# Patient Record
Sex: FEMALE | Race: WHITE | NOT HISPANIC OR LATINO | Employment: UNEMPLOYED | URBAN - METROPOLITAN AREA
[De-identification: names, ages, dates, MRNs, and addresses within clinical notes are randomized per-mention and may not be internally consistent; named-entity substitution may affect disease eponyms.]

---

## 2020-12-18 LAB
NONINV COLON CA DNA+OCC BLD SCRN STL QL: NEGATIVE
NONINV COLON CA DNA+OCC BLD SCRN STL-IMP: NORMAL

## 2023-03-02 PROBLEM — I49.3 FREQUENT PVCS: Status: ACTIVE | Noted: 2023-03-02

## 2023-03-02 PROBLEM — M85.80 BONE LOSS: Status: ACTIVE | Noted: 2023-03-02

## 2023-03-02 PROBLEM — R07.89 ATYPICAL CHEST PAIN: Status: ACTIVE | Noted: 2023-03-02

## 2023-03-02 PROBLEM — N28.9 RENAL INSUFFICIENCY: Status: ACTIVE | Noted: 2023-03-02

## 2023-03-02 PROBLEM — N17.9 ACUTE KIDNEY INJURY (CMS-HCC): Status: ACTIVE | Noted: 2023-03-02

## 2023-03-02 PROBLEM — R26.81 UNSTEADY GAIT: Status: ACTIVE | Noted: 2023-03-02

## 2023-03-02 PROBLEM — R42 ORTHOSTATIC DIZZINESS: Status: ACTIVE | Noted: 2023-03-02

## 2023-03-02 PROBLEM — I47.10 PAROXYSMAL SVT (SUPRAVENTRICULAR TACHYCARDIA) (CMS-HCC): Status: ACTIVE | Noted: 2023-03-02

## 2023-03-02 PROBLEM — N18.2 CHRONIC RENAL IMPAIRMENT, STAGE 2 (MILD): Status: ACTIVE | Noted: 2023-03-02

## 2023-03-02 PROBLEM — H81.10 BENIGN PAROXYSMAL POSITIONAL VERTIGO: Status: ACTIVE | Noted: 2023-03-02

## 2023-03-02 PROBLEM — R07.9 CHEST PAIN: Status: ACTIVE | Noted: 2023-03-02

## 2023-03-02 PROBLEM — I50.32 CHRONIC DIASTOLIC HEART FAILURE WITH PRESERVED EJECTION FRACTION (MULTI): Status: ACTIVE | Noted: 2023-03-02

## 2023-03-02 PROBLEM — I49.1 PAC (PREMATURE ATRIAL CONTRACTION): Status: ACTIVE | Noted: 2023-03-02

## 2023-03-02 PROBLEM — R55 SYNCOPE: Status: ACTIVE | Noted: 2023-03-02

## 2023-03-02 PROBLEM — E78.5 HYPERLIPIDEMIA: Status: ACTIVE | Noted: 2023-03-02

## 2023-03-02 PROBLEM — M81.0 OSTEOPOROSIS: Status: ACTIVE | Noted: 2023-03-02

## 2023-03-02 PROBLEM — H81.10 VERTIGO, BENIGN POSITIONAL: Status: ACTIVE | Noted: 2023-03-02

## 2023-03-02 PROBLEM — I10 ESSENTIAL HYPERTENSION, BENIGN: Status: ACTIVE | Noted: 2023-03-02

## 2023-03-02 PROBLEM — E03.9 HYPOTHYROIDISM: Status: ACTIVE | Noted: 2023-03-02

## 2023-03-02 PROBLEM — H57.04: Status: ACTIVE | Noted: 2023-03-02

## 2023-03-02 PROBLEM — R55 TRANSIENT LOC (LOSS OF CONSCIOUSNESS): Status: ACTIVE | Noted: 2023-03-02

## 2023-03-02 PROBLEM — F41.9 ANXIETY: Status: ACTIVE | Noted: 2023-03-02

## 2023-03-02 PROBLEM — S06.36AA: Status: ACTIVE | Noted: 2023-03-02

## 2023-03-02 PROBLEM — R94.39 ABNORMAL STRESS TEST: Status: ACTIVE | Noted: 2023-03-02

## 2023-03-02 PROBLEM — R42 LIGHTHEADEDNESS: Status: ACTIVE | Noted: 2023-03-02

## 2023-03-02 PROBLEM — H61.23 BILATERAL IMPACTED CERUMEN: Status: ACTIVE | Noted: 2023-03-02

## 2023-03-02 PROBLEM — E53.8 VITAMIN B12 DEFICIENCY: Status: ACTIVE | Noted: 2023-03-02

## 2023-03-02 PROBLEM — R42 DIZZINESS: Status: ACTIVE | Noted: 2023-03-02

## 2023-03-02 RX ORDER — DENOSUMAB 60 MG/ML
60 INJECTION SUBCUTANEOUS
COMMUNITY
Start: 2020-03-03 | End: 2023-10-04 | Stop reason: SDUPTHER

## 2023-03-02 RX ORDER — CEPHRADINE 500 MG
1 CAPSULE ORAL DAILY
COMMUNITY
End: 2024-04-18 | Stop reason: ALTCHOICE

## 2023-03-02 RX ORDER — LANOLIN ALCOHOL/MO/W.PET/CERES
1 CREAM (GRAM) TOPICAL EVERY OTHER DAY
COMMUNITY
End: 2024-02-08 | Stop reason: WASHOUT

## 2023-03-02 RX ORDER — CARVEDILOL 12.5 MG/1
1 TABLET ORAL 2 TIMES DAILY
COMMUNITY
Start: 2022-09-01 | End: 2023-03-29 | Stop reason: SDUPTHER

## 2023-03-02 RX ORDER — PAROXETINE HYDROCHLORIDE 20 MG/1
1 TABLET, FILM COATED ORAL DAILY
COMMUNITY
End: 2023-03-29 | Stop reason: SDUPTHER

## 2023-03-02 RX ORDER — LEVOTHYROXINE SODIUM 75 UG/1
1 CAPSULE ORAL DAILY
COMMUNITY
End: 2023-03-29 | Stop reason: SDUPTHER

## 2023-03-22 LAB
ALANINE AMINOTRANSFERASE (SGPT) (U/L) IN SER/PLAS: 8 U/L (ref 7–45)
ALBUMIN (G/DL) IN SER/PLAS: 4.3 G/DL (ref 3.4–5)
ALKALINE PHOSPHATASE (U/L) IN SER/PLAS: 62 U/L (ref 33–136)
ANION GAP IN SER/PLAS: 11 MMOL/L (ref 10–20)
ASPARTATE AMINOTRANSFERASE (SGOT) (U/L) IN SER/PLAS: 14 U/L (ref 9–39)
BASOPHILS (10*3/UL) IN BLOOD BY AUTOMATED COUNT: 0.02 X10E9/L (ref 0–0.1)
BASOPHILS/100 LEUKOCYTES IN BLOOD BY AUTOMATED COUNT: 0.4 % (ref 0–2)
BILIRUBIN TOTAL (MG/DL) IN SER/PLAS: 0.9 MG/DL (ref 0–1.2)
CALCIDIOL (25 OH VITAMIN D3) (NG/ML) IN SER/PLAS: 40 NG/ML
CALCIUM (MG/DL) IN SER/PLAS: 9.6 MG/DL (ref 8.6–10.3)
CARBON DIOXIDE, TOTAL (MMOL/L) IN SER/PLAS: 30 MMOL/L (ref 21–32)
CHLORIDE (MMOL/L) IN SER/PLAS: 102 MMOL/L (ref 98–107)
CHOLESTEROL (MG/DL) IN SER/PLAS: 171 MG/DL (ref 0–199)
CHOLESTEROL IN HDL (MG/DL) IN SER/PLAS: 31 MG/DL
CHOLESTEROL/HDL RATIO: 5.5
COBALAMIN (VITAMIN B12) (PG/ML) IN SER/PLAS: 1194 PG/ML (ref 211–911)
CREATININE (MG/DL) IN SER/PLAS: 0.99 MG/DL (ref 0.5–1.05)
EOSINOPHILS (10*3/UL) IN BLOOD BY AUTOMATED COUNT: 0.09 X10E9/L (ref 0–0.4)
EOSINOPHILS/100 LEUKOCYTES IN BLOOD BY AUTOMATED COUNT: 1.7 % (ref 0–6)
ERYTHROCYTE DISTRIBUTION WIDTH (RATIO) BY AUTOMATED COUNT: 14.3 % (ref 11.5–14.5)
ERYTHROCYTE MEAN CORPUSCULAR HEMOGLOBIN CONCENTRATION (G/DL) BY AUTOMATED: 31.2 G/DL (ref 32–36)
ERYTHROCYTE MEAN CORPUSCULAR VOLUME (FL) BY AUTOMATED COUNT: 88 FL (ref 80–100)
ERYTHROCYTES (10*6/UL) IN BLOOD BY AUTOMATED COUNT: 4.72 X10E12/L (ref 4–5.2)
GFR FEMALE: 53 ML/MIN/1.73M2
GLUCOSE (MG/DL) IN SER/PLAS: 123 MG/DL (ref 74–99)
HEMATOCRIT (%) IN BLOOD BY AUTOMATED COUNT: 41.7 % (ref 36–46)
HEMOGLOBIN (G/DL) IN BLOOD: 13 G/DL (ref 12–16)
IMMATURE GRANULOCYTES/100 LEUKOCYTES IN BLOOD BY AUTOMATED COUNT: 0.4 % (ref 0–0.9)
LDL: 98 MG/DL (ref 0–99)
LEUKOCYTES (10*3/UL) IN BLOOD BY AUTOMATED COUNT: 5.2 X10E9/L (ref 4.4–11.3)
LYMPHOCYTES (10*3/UL) IN BLOOD BY AUTOMATED COUNT: 1.18 X10E9/L (ref 0.8–3)
LYMPHOCYTES/100 LEUKOCYTES IN BLOOD BY AUTOMATED COUNT: 22.7 % (ref 13–44)
MONOCYTES (10*3/UL) IN BLOOD BY AUTOMATED COUNT: 0.15 X10E9/L (ref 0.05–0.8)
MONOCYTES/100 LEUKOCYTES IN BLOOD BY AUTOMATED COUNT: 2.9 % (ref 2–10)
NEUTROPHILS (10*3/UL) IN BLOOD BY AUTOMATED COUNT: 3.73 X10E9/L (ref 1.6–5.5)
NEUTROPHILS/100 LEUKOCYTES IN BLOOD BY AUTOMATED COUNT: 71.9 % (ref 40–80)
NON HDL CHOLESTEROL: 140 MG/DL
PLATELETS (10*3/UL) IN BLOOD AUTOMATED COUNT: 175 X10E9/L (ref 150–450)
POTASSIUM (MMOL/L) IN SER/PLAS: 4.1 MMOL/L (ref 3.5–5.3)
PROTEIN TOTAL: 7 G/DL (ref 6.4–8.2)
SODIUM (MMOL/L) IN SER/PLAS: 139 MMOL/L (ref 136–145)
THYROTROPIN (MIU/L) IN SER/PLAS BY DETECTION LIMIT <= 0.05 MIU/L: 0.19 MIU/L (ref 0.44–3.98)
THYROXINE (T4) FREE (NG/DL) IN SER/PLAS: 1.23 NG/DL (ref 0.61–1.12)
TRIGLYCERIDE (MG/DL) IN SER/PLAS: 212 MG/DL (ref 0–149)
UREA NITROGEN (MG/DL) IN SER/PLAS: 24 MG/DL (ref 6–23)
VLDL: 42 MG/DL (ref 0–40)

## 2023-03-24 LAB
ALBUMIN (MG/L) IN URINE: 504.7 MG/L
ALBUMIN/CREATININE (UG/MG) IN URINE: 696.1 UG/MG CRT (ref 0–30)
CREATININE (MG/DL) IN URINE: 72.5 MG/DL (ref 20–320)

## 2023-03-29 ENCOUNTER — OFFICE VISIT (OUTPATIENT)
Dept: PRIMARY CARE | Facility: CLINIC | Age: 88
End: 2023-03-29
Payer: COMMERCIAL

## 2023-03-29 VITALS
BODY MASS INDEX: 24.35 KG/M2 | HEART RATE: 100 BPM | HEIGHT: 60 IN | OXYGEN SATURATION: 96 % | DIASTOLIC BLOOD PRESSURE: 126 MMHG | SYSTOLIC BLOOD PRESSURE: 190 MMHG | WEIGHT: 124 LBS | RESPIRATION RATE: 16 BRPM | TEMPERATURE: 97.7 F

## 2023-03-29 DIAGNOSIS — I47.10 PAROXYSMAL SVT (SUPRAVENTRICULAR TACHYCARDIA) (CMS-HCC): ICD-10-CM

## 2023-03-29 DIAGNOSIS — M81.0 AGE-RELATED OSTEOPOROSIS WITHOUT CURRENT PATHOLOGICAL FRACTURE: ICD-10-CM

## 2023-03-29 DIAGNOSIS — Z00.00 ROUTINE GENERAL MEDICAL EXAMINATION AT HEALTH CARE FACILITY: ICD-10-CM

## 2023-03-29 DIAGNOSIS — N18.2 CHRONIC RENAL IMPAIRMENT, STAGE 2 (MILD): ICD-10-CM

## 2023-03-29 DIAGNOSIS — E03.9 HYPOTHYROIDISM, UNSPECIFIED TYPE: ICD-10-CM

## 2023-03-29 DIAGNOSIS — E78.5 HYPERLIPIDEMIA, UNSPECIFIED HYPERLIPIDEMIA TYPE: ICD-10-CM

## 2023-03-29 DIAGNOSIS — S06.36AS: ICD-10-CM

## 2023-03-29 DIAGNOSIS — I10 ESSENTIAL HYPERTENSION, BENIGN: Primary | ICD-10-CM

## 2023-03-29 DIAGNOSIS — F41.9 ANXIETY: ICD-10-CM

## 2023-03-29 DIAGNOSIS — E53.8 VITAMIN B12 DEFICIENCY: ICD-10-CM

## 2023-03-29 DIAGNOSIS — N28.9 RENAL INSUFFICIENCY: ICD-10-CM

## 2023-03-29 PROBLEM — M70.61 TROCHANTERIC BURSITIS OF RIGHT HIP: Status: ACTIVE | Noted: 2017-08-25

## 2023-03-29 PROBLEM — M76.31 ILIOTIBIAL BAND SYNDROME OF RIGHT SIDE: Status: ACTIVE | Noted: 2017-08-25

## 2023-03-29 PROBLEM — H02.402 PTOSIS OF EYELID, LEFT: Status: ACTIVE | Noted: 2018-02-12

## 2023-03-29 PROBLEM — N60.09 BREAST CYST: Status: ACTIVE | Noted: 2018-01-05

## 2023-03-29 PROBLEM — M53.3 COCCYX PAIN: Status: ACTIVE | Noted: 2017-09-26

## 2023-03-29 PROBLEM — M47.816 SPONDYLOSIS WITHOUT MYELOPATHY OR RADICULOPATHY, LUMBAR REGION: Status: ACTIVE | Noted: 2017-08-25

## 2023-03-29 PROBLEM — H53.2 BINOCULAR VISION DISORDER WITH DIPLOPIA: Status: ACTIVE | Noted: 2018-02-12

## 2023-03-29 PROBLEM — K86.2 PANCREAS CYST (HHS-HCC): Status: ACTIVE | Noted: 2018-01-05

## 2023-03-29 PROBLEM — G89.29 CHRONIC PAIN: Status: ACTIVE | Noted: 2017-08-25

## 2023-03-29 PROBLEM — M48.061 SPINAL STENOSIS, LUMBAR REGION, WITHOUT NEUROGENIC CLAUDICATION: Status: ACTIVE | Noted: 2017-08-25

## 2023-03-29 PROCEDURE — 1160F RVW MEDS BY RX/DR IN RCRD: CPT | Performed by: INTERNAL MEDICINE

## 2023-03-29 PROCEDURE — 1170F FXNL STATUS ASSESSED: CPT | Performed by: INTERNAL MEDICINE

## 2023-03-29 PROCEDURE — 1159F MED LIST DOCD IN RCRD: CPT | Performed by: INTERNAL MEDICINE

## 2023-03-29 PROCEDURE — G0439 PPPS, SUBSEQ VISIT: HCPCS | Performed by: INTERNAL MEDICINE

## 2023-03-29 PROCEDURE — 99214 OFFICE O/P EST MOD 30 MIN: CPT | Performed by: INTERNAL MEDICINE

## 2023-03-29 PROCEDURE — 1036F TOBACCO NON-USER: CPT | Performed by: INTERNAL MEDICINE

## 2023-03-29 PROCEDURE — 3080F DIAST BP >= 90 MM HG: CPT | Performed by: INTERNAL MEDICINE

## 2023-03-29 PROCEDURE — 3077F SYST BP >= 140 MM HG: CPT | Performed by: INTERNAL MEDICINE

## 2023-03-29 RX ORDER — PAROXETINE HYDROCHLORIDE 20 MG/1
20 TABLET, FILM COATED ORAL DAILY
Qty: 90 TABLET | Refills: 1 | Status: SHIPPED | OUTPATIENT
Start: 2023-03-29 | End: 2023-10-04 | Stop reason: ALTCHOICE

## 2023-03-29 RX ORDER — LEVOTHYROXINE SODIUM 50 UG/1
50 TABLET ORAL
Qty: 90 TABLET | Refills: 3 | Status: SHIPPED | OUTPATIENT
Start: 2023-03-29 | End: 2024-02-29

## 2023-03-29 RX ORDER — CARVEDILOL 25 MG/1
25 TABLET ORAL 2 TIMES DAILY
Qty: 180 TABLET | Refills: 1 | Status: SHIPPED | OUTPATIENT
Start: 2023-03-29 | End: 2023-08-21 | Stop reason: SDUPTHER

## 2023-03-29 SDOH — ECONOMIC STABILITY: FOOD INSECURITY: WITHIN THE PAST 12 MONTHS, YOU WORRIED THAT YOUR FOOD WOULD RUN OUT BEFORE YOU GOT MONEY TO BUY MORE.: NEVER TRUE

## 2023-03-29 SDOH — ECONOMIC STABILITY: FOOD INSECURITY: WITHIN THE PAST 12 MONTHS, THE FOOD YOU BOUGHT JUST DIDN'T LAST AND YOU DIDN'T HAVE MONEY TO GET MORE.: NEVER TRUE

## 2023-03-29 ASSESSMENT — ACTIVITIES OF DAILY LIVING (ADL)
DOING_HOUSEWORK: NEEDS ASSISTANCE
GROCERY_SHOPPING: TOTAL CARE
TAKING_MEDICATION: INDEPENDENT
BATHING: INDEPENDENT
MANAGING_FINANCES: INDEPENDENT
DRESSING: INDEPENDENT

## 2023-03-29 ASSESSMENT — LIFESTYLE VARIABLES
HOW OFTEN DO YOU HAVE SIX OR MORE DRINKS ON ONE OCCASION: NEVER
HOW MANY STANDARD DRINKS CONTAINING ALCOHOL DO YOU HAVE ON A TYPICAL DAY: PATIENT DOES NOT DRINK
HOW OFTEN DO YOU HAVE A DRINK CONTAINING ALCOHOL: NEVER
AUDIT-C TOTAL SCORE: 0
SKIP TO QUESTIONS 9-10: 1

## 2023-03-29 ASSESSMENT — ENCOUNTER SYMPTOMS
JOINT SWELLING: 0
SHORTNESS OF BREATH: 0
NAUSEA: 0
EYES NEGATIVE: 1
FATIGUE: 0
DIFFICULTY URINATING: 0
CHEST TIGHTNESS: 0
HEADACHES: 0
LOSS OF SENSATION IN FEET: 0
BLOOD IN STOOL: 0
DIZZINESS: 0
LIGHT-HEADEDNESS: 0
FEVER: 0
FREQUENCY: 0
POLYDIPSIA: 0
ABDOMINAL PAIN: 0
PSYCHIATRIC NEGATIVE: 1
OCCASIONAL FEELINGS OF UNSTEADINESS: 1
BACK PAIN: 0
NECK PAIN: 0
DIARRHEA: 0
CHILLS: 0
FLANK PAIN: 0
VOMITING: 0
DYSURIA: 0
PALPITATIONS: 0
UNEXPECTED WEIGHT CHANGE: 0
MYALGIAS: 0

## 2023-03-29 ASSESSMENT — PATIENT HEALTH QUESTIONNAIRE - PHQ9
SUM OF ALL RESPONSES TO PHQ9 QUESTIONS 1 & 2: 0
2. FEELING DOWN, DEPRESSED OR HOPELESS: NOT AT ALL
1. LITTLE INTEREST OR PLEASURE IN DOING THINGS: NOT AT ALL

## 2023-03-29 NOTE — PROGRESS NOTES
Subjective   Patient ID: Gladys Salas is a 89 y.o. female who presents for No chief complaint on file..    HPI     Saw Dr. Aguirre in 12/2022. Recent Echo demonstrates normal left ventricular systolic function with an ejection fraction of 65% and no significant valve abnormalities. She should continue current cardiac medications. Has upcoming follow-up appointment.    HTN: BP is fair today, continue the carvedilol only, follow up with Dr Aguirre     renal insufficiency, likely due to pre renal azotemia, patient will follow up with nephrology, recheck labs to document improvement     hypothyroidism: treated     hypertriglyceridemia: try to avoid added sweets in the diet, recheck labs in 6 months, trial of Vascepa if the TG levels remain elevated      osteoporosis: continue the Prolia as ordered, no recent fractures were noted     B12 deficiency: has decreased dose of B12 due to high B12 levels, levels have fallen, recheck labs in 6 months     anxiety and depression, sleep disorder: continue the paroxetine, it is helpful for the patient    Review of Systems    Objective   There were no vitals taken for this visit.    Physical Exam    Assessment/Plan   {Assess/PlanSmartLinks:05893}    Follow-up here in 6 months   Recheck labs in 4-6 months  Maintain follow-up appointments with specialists

## 2023-03-29 NOTE — PROGRESS NOTES
Subjective   Reason for Visit: Gladys Salas is an 89 y.o. female here for a Medicare Wellness visit.     Past Medical, Surgical, and Family History reviewed and updated in chart.    Reviewed all medications by prescribing practitioner or clinical pharmacist (such as prescriptions, OTCs, herbal therapies and supplements) and documented in the medical record.    HPI  Patient is here for medicare wellness. Overall is feeling well. Has no concerns today.    Saw Dr. Aguirre in 12/2022. Recent Echo demonstrates normal left ventricular systolic function with an ejection fraction of 65% and no significant valve abnormalities. She should continue current cardiac medications. Has upcoming follow-up appointment in 7/2023. Patient took multiple meds in the past, these were decreased by Dr Aguirre     HTN: BP is elevated today, continue the carvedilol only, follow up with Dr Aguirre.     renal insufficiency: Patient just followed up with nephrologist.      hypothyroidism: Tolerating meds well.      hypertriglyceridemia: try to avoid added sweets in the diet, recheck labs in 6 months, trial of Vascepa if the TG levels remain elevated      osteoporosis: Taking Prolia.      B12 deficiency: Takes B12 every other day. B12 levels elevated on recent labs.      anxiety and depression, sleep disorder: Has been sleeping better. Doing good with Paxil at night.        Patient Care Team:  Sulaiman Santoyo MD as PCP - General     Review of Systems   Constitutional:  Negative for chills, fatigue, fever and unexpected weight change.   HENT: Negative.     Eyes: Negative.    Respiratory:  Negative for chest tightness and shortness of breath.    Cardiovascular:  Negative for chest pain, palpitations and leg swelling.   Gastrointestinal:  Negative for abdominal pain, blood in stool, diarrhea, nausea and vomiting.   Endocrine: Negative for cold intolerance, heat intolerance, polydipsia and polyuria.   Genitourinary:  Negative for decreased urine  volume, difficulty urinating, dysuria, flank pain, frequency and urgency.   Musculoskeletal:  Negative for back pain, joint swelling, myalgias and neck pain.   Skin: Negative.    Neurological:  Negative for dizziness, light-headedness and headaches.        Patient saw kidney doctor and neurologist   Psychiatric/Behavioral: Negative.         Objective   Vitals:  BP (!) 190/126 (BP Location: Right arm, Patient Position: Sitting, BP Cuff Size: Adult)   Pulse 100   Temp 36.5 °C (97.7 °F)   Resp 16   Ht 1.524 m (5')   Wt 56.2 kg (124 lb)   SpO2 96%   BMI 24.22 kg/m²       Physical Exam  Constitutional:       Appearance: Normal appearance. She is normal weight. She is not ill-appearing.   HENT:      Head: Normocephalic and atraumatic.      Right Ear: External ear normal.      Left Ear: External ear normal.      Nose: Nose normal.      Mouth/Throat:      Mouth: Mucous membranes are moist.   Eyes:      Extraocular Movements: Extraocular movements intact.      Conjunctiva/sclera: Conjunctivae normal.   Cardiovascular:      Rate and Rhythm: Tachycardia present. Rhythm irregular.      Pulses: Normal pulses.      Heart sounds: Normal heart sounds.      Comments: Irregular rate and rhythm today noted, patient feels well  Pulmonary:      Effort: Pulmonary effort is normal.      Breath sounds: Normal breath sounds.   Chest:      Chest wall: No tenderness.   Abdominal:      General: Abdomen is flat.      Palpations: Abdomen is soft.      Tenderness: There is no abdominal tenderness. There is no guarding or rebound.   Musculoskeletal:         General: Normal range of motion.      Cervical back: Normal range of motion and neck supple. No tenderness.      Right lower leg: No edema.      Left lower leg: No edema.   Lymphadenopathy:      Cervical: No cervical adenopathy.   Skin:     General: Skin is warm and dry.      Findings: No rash.   Neurological:      General: No focal deficit present.      Mental Status: She is alert and  oriented to person, place, and time.      Motor: No weakness.      Gait: Gait normal.      Deep Tendon Reflexes: Reflexes normal.   Psychiatric:         Mood and Affect: Mood normal.         Behavior: Behavior normal.         Thought Content: Thought content normal.         Judgment: Judgment normal.         Assessment/Plan   Problem List Items Addressed This Visit          Nervous    Traumatic cerebral hemorrhage    Current Assessment & Plan     Patient did follow up with neurology,  she is stable         Relevant Orders    CBC and Auto Differential       Circulatory    Essential hypertension, benign - Primary    Current Assessment & Plan     Will increase the dose of carvedilol for now, patient should follow up with cardiology as scheduled         Relevant Medications    carvedilol (Coreg) 25 mg tablet    Other Relevant Orders    Albumin , Urine Random    CBC and Auto Differential    Paroxysmal SVT (supraventricular tachycardia) (CMS/HCC)    Current Assessment & Plan     Patient is tachycardic now, has an irregular rhythm, check an ECG now, This is SVT vs atrial fibrillation. Some P waves may be noted in the ECG. Will consult with cardiology about options for the patient.          Relevant Medications    carvedilol (Coreg) 25 mg tablet    Other Relevant Orders    ECG 12 lead (Ancillary Performed)    CBC and Auto Differential       Genitourinary    Chronic renal impairment, stage 2 (mild)    Current Assessment & Plan     Patient is stable, she sees nephrology         Relevant Orders    Comprehensive Metabolic Panel    CBC and Auto Differential    Renal insufficiency    Relevant Orders    CBC and Auto Differential       Musculoskeletal    Osteoporosis    Current Assessment & Plan     Continue Prolia as ordered, Follow up as scheduled.  Check a DEXA scan         Relevant Orders    XR DEXA bone density    Vitamin D 1,25 Dihydroxy    CBC and Auto Differential       Endocrine/Metabolic    Hypothyroidism    Current  Assessment & Plan     Patient appears slightly hyperthyroid now, will decrease the dose of levothyroxine          Relevant Medications    levothyroxine (Euthyrox) 50 mcg tablet    Other Relevant Orders    TSH with reflex to Free T4 if abnormal    CBC and Auto Differential    Vitamin B12 deficiency    Current Assessment & Plan     Reduce dose of B 12 to every 3rd day, recheck labs in 4-6 months         Relevant Orders    CBC and Auto Differential    Vitamin B12       Other    Anxiety    Relevant Medications    PARoxetine (Paxil) 20 mg tablet    Other Relevant Orders    CBC and Auto Differential    Hyperlipidemia    Current Assessment & Plan     Fair on current med therapy, TG level is slightly elevated, recheck labs in 6 months         Relevant Orders    Lipid panel    CBC and Auto Differential     Other Visit Diagnoses       Routine general medical examination at health care facility        Relevant Orders    CBC and Auto Differential        Medicare wellness exam completed today

## 2023-03-29 NOTE — ASSESSMENT & PLAN NOTE
Patient is tachycardic now, has an irregular rhythm, check an ECG now, This is SVT vs atrial fibrillation. Some P waves may be noted in the ECG. Will consult with cardiology about options for the patient.

## 2023-03-29 NOTE — ASSESSMENT & PLAN NOTE
Will increase the dose of carvedilol for now, patient should follow up with cardiology as scheduled

## 2023-06-27 LAB
ANION GAP IN SER/PLAS: 13 MMOL/L (ref 10–20)
CALCIUM (MG/DL) IN SER/PLAS: 9.4 MG/DL (ref 8.6–10.3)
CARBON DIOXIDE, TOTAL (MMOL/L) IN SER/PLAS: 28 MMOL/L (ref 21–32)
CHLORIDE (MMOL/L) IN SER/PLAS: 103 MMOL/L (ref 98–107)
CREATININE (MG/DL) IN SER/PLAS: 1.02 MG/DL (ref 0.5–1.05)
ERYTHROCYTE DISTRIBUTION WIDTH (RATIO) BY AUTOMATED COUNT: 14.7 % (ref 11.5–14.5)
ERYTHROCYTE MEAN CORPUSCULAR HEMOGLOBIN CONCENTRATION (G/DL) BY AUTOMATED: 31.2 G/DL (ref 32–36)
ERYTHROCYTE MEAN CORPUSCULAR VOLUME (FL) BY AUTOMATED COUNT: 91 FL (ref 80–100)
ERYTHROCYTES (10*6/UL) IN BLOOD BY AUTOMATED COUNT: 4.77 X10E12/L (ref 4–5.2)
GFR FEMALE: 51 ML/MIN/1.73M2
GLUCOSE (MG/DL) IN SER/PLAS: 156 MG/DL (ref 74–99)
HEMATOCRIT (%) IN BLOOD BY AUTOMATED COUNT: 43.3 % (ref 36–46)
HEMOGLOBIN (G/DL) IN BLOOD: 13.5 G/DL (ref 12–16)
LEUKOCYTES (10*3/UL) IN BLOOD BY AUTOMATED COUNT: 5.3 X10E9/L (ref 4.4–11.3)
MAGNESIUM (MG/DL) IN SER/PLAS: 1.88 MG/DL (ref 1.6–2.4)
NATRIURETIC PEPTIDE B (PG/ML) IN SER/PLAS: 233 PG/ML (ref 0–99)
PLATELETS (10*3/UL) IN BLOOD AUTOMATED COUNT: 173 X10E9/L (ref 150–450)
POTASSIUM (MMOL/L) IN SER/PLAS: 4.2 MMOL/L (ref 3.5–5.3)
SODIUM (MMOL/L) IN SER/PLAS: 140 MMOL/L (ref 136–145)
UREA NITROGEN (MG/DL) IN SER/PLAS: 23 MG/DL (ref 6–23)

## 2023-08-21 DIAGNOSIS — I47.10 PAROXYSMAL SVT (SUPRAVENTRICULAR TACHYCARDIA) (CMS-HCC): ICD-10-CM

## 2023-08-21 DIAGNOSIS — I10 ESSENTIAL HYPERTENSION, BENIGN: ICD-10-CM

## 2023-08-21 PROBLEM — I48.91 ATRIAL FIBRILLATION (MULTI): Status: ACTIVE | Noted: 2023-08-21

## 2023-08-21 PROBLEM — M17.12 PRIMARY OSTEOARTHRITIS OF LEFT KNEE: Status: ACTIVE | Noted: 2018-09-05

## 2023-08-21 PROBLEM — H81.11 BENIGN PAROXYSMAL POSITIONAL VERTIGO OF RIGHT EAR: Status: ACTIVE | Noted: 2023-08-21

## 2023-08-21 PROBLEM — B02.39 OTHER HERPES ZOSTER EYE DISEASE: Status: ACTIVE | Noted: 2018-01-05

## 2023-08-21 RX ORDER — RALOXIFENE HYDROCHLORIDE 60 MG/1
60 TABLET, FILM COATED ORAL DAILY
COMMUNITY
Start: 2016-10-10 | End: 2023-10-04 | Stop reason: ALTCHOICE

## 2023-08-21 RX ORDER — HYDROCHLOROTHIAZIDE 25 MG/1
25 TABLET ORAL DAILY
COMMUNITY
Start: 2016-10-10 | End: 2023-10-04 | Stop reason: ALTCHOICE

## 2023-08-21 RX ORDER — CARVEDILOL 25 MG/1
25 TABLET ORAL 2 TIMES DAILY
Qty: 60 TABLET | Refills: 11 | Status: SHIPPED | OUTPATIENT
Start: 2023-08-21 | End: 2023-08-25 | Stop reason: SDUPTHER

## 2023-08-21 RX ORDER — FUROSEMIDE 20 MG/1
20 TABLET ORAL
COMMUNITY
Start: 2016-10-17 | End: 2023-10-04 | Stop reason: ALTCHOICE

## 2023-08-21 RX ORDER — VERAPAMIL HYDROCHLORIDE 240 MG/1
240 CAPSULE, EXTENDED RELEASE ORAL NIGHTLY
COMMUNITY
Start: 2016-09-28 | End: 2023-10-04 | Stop reason: ALTCHOICE

## 2023-08-21 RX ORDER — AMLODIPINE BESYLATE 5 MG/1
1 TABLET ORAL DAILY
COMMUNITY
Start: 2023-07-03 | End: 2024-01-30

## 2023-08-21 RX ORDER — IRBESARTAN 150 MG/1
150 TABLET ORAL DAILY
COMMUNITY
Start: 2016-08-16 | End: 2024-02-08 | Stop reason: WASHOUT

## 2023-08-21 NOTE — TELEPHONE ENCOUNTER
Pt has a refill on her carvedilol (Coreg) 25 mg tablet   But it says that it ends after 180 doses. She gets 180 in one fill. Can you please send in a new script ASAP, she is almost out

## 2023-08-25 RX ORDER — CARVEDILOL 25 MG/1
25 TABLET ORAL 2 TIMES DAILY
Qty: 180 TABLET | Refills: 3 | Status: SHIPPED | OUTPATIENT
Start: 2023-08-25 | End: 2024-08-24

## 2023-09-26 ENCOUNTER — LAB (OUTPATIENT)
Dept: LAB | Facility: LAB | Age: 88
End: 2023-09-26
Payer: COMMERCIAL

## 2023-09-26 DIAGNOSIS — E53.8 VITAMIN B12 DEFICIENCY: ICD-10-CM

## 2023-09-26 DIAGNOSIS — S06.36AS: ICD-10-CM

## 2023-09-26 DIAGNOSIS — E03.9 HYPOTHYROIDISM, UNSPECIFIED TYPE: ICD-10-CM

## 2023-09-26 DIAGNOSIS — I47.10 PAROXYSMAL SVT (SUPRAVENTRICULAR TACHYCARDIA) (CMS-HCC): ICD-10-CM

## 2023-09-26 DIAGNOSIS — Z00.00 ROUTINE GENERAL MEDICAL EXAMINATION AT HEALTH CARE FACILITY: ICD-10-CM

## 2023-09-26 DIAGNOSIS — N18.2 CHRONIC RENAL IMPAIRMENT, STAGE 2 (MILD): ICD-10-CM

## 2023-09-26 DIAGNOSIS — E78.5 HYPERLIPIDEMIA, UNSPECIFIED HYPERLIPIDEMIA TYPE: ICD-10-CM

## 2023-09-26 DIAGNOSIS — N28.9 RENAL INSUFFICIENCY: ICD-10-CM

## 2023-09-26 DIAGNOSIS — I10 ESSENTIAL HYPERTENSION, BENIGN: ICD-10-CM

## 2023-09-26 DIAGNOSIS — M81.0 AGE-RELATED OSTEOPOROSIS WITHOUT CURRENT PATHOLOGICAL FRACTURE: ICD-10-CM

## 2023-09-26 DIAGNOSIS — F41.9 ANXIETY: ICD-10-CM

## 2023-09-26 LAB
ALANINE AMINOTRANSFERASE (SGPT) (U/L) IN SER/PLAS: 9 U/L (ref 7–45)
ALBUMIN (G/DL) IN SER/PLAS: 4.6 G/DL (ref 3.4–5)
ALKALINE PHOSPHATASE (U/L) IN SER/PLAS: 53 U/L (ref 33–136)
ANION GAP IN SER/PLAS: 13 MMOL/L (ref 10–20)
ASPARTATE AMINOTRANSFERASE (SGOT) (U/L) IN SER/PLAS: 14 U/L (ref 9–39)
BASOPHILS (10*3/UL) IN BLOOD BY AUTOMATED COUNT: 0.03 X10E9/L (ref 0–0.1)
BASOPHILS/100 LEUKOCYTES IN BLOOD BY AUTOMATED COUNT: 0.5 % (ref 0–2)
BILIRUBIN TOTAL (MG/DL) IN SER/PLAS: 1.2 MG/DL (ref 0–1.2)
CALCIUM (MG/DL) IN SER/PLAS: 9.8 MG/DL (ref 8.6–10.3)
CARBON DIOXIDE, TOTAL (MMOL/L) IN SER/PLAS: 31 MMOL/L (ref 21–32)
CHLORIDE (MMOL/L) IN SER/PLAS: 101 MMOL/L (ref 98–107)
CHOLESTEROL (MG/DL) IN SER/PLAS: 203 MG/DL (ref 0–199)
CHOLESTEROL IN HDL (MG/DL) IN SER/PLAS: 32 MG/DL
CHOLESTEROL/HDL RATIO: 6.3
COBALAMIN (VITAMIN B12) (PG/ML) IN SER/PLAS: 1299 PG/ML (ref 211–911)
CREATININE (MG/DL) IN SER/PLAS: 1.21 MG/DL (ref 0.5–1.05)
EOSINOPHILS (10*3/UL) IN BLOOD BY AUTOMATED COUNT: 0.04 X10E9/L (ref 0–0.4)
EOSINOPHILS/100 LEUKOCYTES IN BLOOD BY AUTOMATED COUNT: 0.6 % (ref 0–6)
ERYTHROCYTE DISTRIBUTION WIDTH (RATIO) BY AUTOMATED COUNT: 13.9 % (ref 11.5–14.5)
ERYTHROCYTE MEAN CORPUSCULAR HEMOGLOBIN CONCENTRATION (G/DL) BY AUTOMATED: 32.1 G/DL (ref 32–36)
ERYTHROCYTE MEAN CORPUSCULAR VOLUME (FL) BY AUTOMATED COUNT: 91 FL (ref 80–100)
ERYTHROCYTES (10*6/UL) IN BLOOD BY AUTOMATED COUNT: 4.88 X10E12/L (ref 4–5.2)
GFR FEMALE: 41 ML/MIN/1.73M2
GLUCOSE (MG/DL) IN SER/PLAS: 122 MG/DL (ref 74–99)
HEMATOCRIT (%) IN BLOOD BY AUTOMATED COUNT: 44.6 % (ref 36–46)
HEMOGLOBIN (G/DL) IN BLOOD: 14.3 G/DL (ref 12–16)
IMMATURE GRANULOCYTES/100 LEUKOCYTES IN BLOOD BY AUTOMATED COUNT: 0.3 % (ref 0–0.9)
LDL: 124 MG/DL (ref 0–99)
LEUKOCYTES (10*3/UL) IN BLOOD BY AUTOMATED COUNT: 6.5 X10E9/L (ref 4.4–11.3)
LYMPHOCYTES (10*3/UL) IN BLOOD BY AUTOMATED COUNT: 1.18 X10E9/L (ref 0.8–3)
LYMPHOCYTES/100 LEUKOCYTES IN BLOOD BY AUTOMATED COUNT: 18.3 % (ref 13–44)
MONOCYTES (10*3/UL) IN BLOOD BY AUTOMATED COUNT: 0.18 X10E9/L (ref 0.05–0.8)
MONOCYTES/100 LEUKOCYTES IN BLOOD BY AUTOMATED COUNT: 2.8 % (ref 2–10)
NEUTROPHILS (10*3/UL) IN BLOOD BY AUTOMATED COUNT: 5 X10E9/L (ref 1.6–5.5)
NEUTROPHILS/100 LEUKOCYTES IN BLOOD BY AUTOMATED COUNT: 77.5 % (ref 40–80)
NON HDL CHOLESTEROL: 171 MG/DL
PLATELETS (10*3/UL) IN BLOOD AUTOMATED COUNT: 176 X10E9/L (ref 150–450)
POTASSIUM (MMOL/L) IN SER/PLAS: 4.3 MMOL/L (ref 3.5–5.3)
PROTEIN TOTAL: 7 G/DL (ref 6.4–8.2)
SODIUM (MMOL/L) IN SER/PLAS: 141 MMOL/L (ref 136–145)
THYROTROPIN (MIU/L) IN SER/PLAS BY DETECTION LIMIT <= 0.05 MIU/L: 3.15 MIU/L (ref 0.44–3.98)
TRIGLYCERIDE (MG/DL) IN SER/PLAS: 234 MG/DL (ref 0–149)
UREA NITROGEN (MG/DL) IN SER/PLAS: 34 MG/DL (ref 6–23)
VLDL: 47 MG/DL (ref 0–40)

## 2023-09-26 PROCEDURE — 82652 VIT D 1 25-DIHYDROXY: CPT

## 2023-09-26 PROCEDURE — 80061 LIPID PANEL: CPT

## 2023-09-26 PROCEDURE — 82570 ASSAY OF URINE CREATININE: CPT

## 2023-09-26 PROCEDURE — 85025 COMPLETE CBC W/AUTO DIFF WBC: CPT

## 2023-09-26 PROCEDURE — 36415 COLL VENOUS BLD VENIPUNCTURE: CPT

## 2023-09-26 PROCEDURE — 82043 UR ALBUMIN QUANTITATIVE: CPT

## 2023-09-26 PROCEDURE — 84443 ASSAY THYROID STIM HORMONE: CPT

## 2023-09-26 PROCEDURE — 80053 COMPREHEN METABOLIC PANEL: CPT

## 2023-09-26 PROCEDURE — 82607 VITAMIN B-12: CPT

## 2023-09-27 LAB
ALBUMIN (MG/L) IN URINE: 46.9 MG/L
ALBUMIN/CREATININE (UG/MG) IN URINE: 46.9 UG/MG CRT (ref 0–30)
CREATININE (MG/DL) IN URINE: 100 MG/DL (ref 20–320)

## 2023-09-29 ENCOUNTER — APPOINTMENT (OUTPATIENT)
Dept: PRIMARY CARE | Facility: CLINIC | Age: 88
End: 2023-09-29
Payer: COMMERCIAL

## 2023-09-29 LAB — VITAMIN D 1,25-DIHYDROXY: 27.2 PG/ML (ref 19.9–79.3)

## 2023-10-04 ENCOUNTER — OFFICE VISIT (OUTPATIENT)
Dept: PRIMARY CARE | Facility: CLINIC | Age: 88
End: 2023-10-04
Payer: COMMERCIAL

## 2023-10-04 VITALS
TEMPERATURE: 96.9 F | BODY MASS INDEX: 24.35 KG/M2 | HEART RATE: 88 BPM | HEIGHT: 60 IN | OXYGEN SATURATION: 95 % | RESPIRATION RATE: 16 BRPM | WEIGHT: 124 LBS | DIASTOLIC BLOOD PRESSURE: 72 MMHG | SYSTOLIC BLOOD PRESSURE: 142 MMHG

## 2023-10-04 DIAGNOSIS — E78.5 HYPERLIPIDEMIA, UNSPECIFIED HYPERLIPIDEMIA TYPE: ICD-10-CM

## 2023-10-04 DIAGNOSIS — R73.9 HYPERGLYCEMIA: ICD-10-CM

## 2023-10-04 DIAGNOSIS — E03.9 HYPOTHYROIDISM, UNSPECIFIED TYPE: ICD-10-CM

## 2023-10-04 DIAGNOSIS — I48.91 ATRIAL FIBRILLATION, UNSPECIFIED TYPE (MULTI): ICD-10-CM

## 2023-10-04 DIAGNOSIS — N18.2 CHRONIC RENAL IMPAIRMENT, STAGE 2 (MILD): ICD-10-CM

## 2023-10-04 DIAGNOSIS — M81.0 AGE-RELATED OSTEOPOROSIS WITHOUT CURRENT PATHOLOGICAL FRACTURE: Primary | ICD-10-CM

## 2023-10-04 DIAGNOSIS — S06.36AS: ICD-10-CM

## 2023-10-04 DIAGNOSIS — I10 ESSENTIAL HYPERTENSION, BENIGN: ICD-10-CM

## 2023-10-04 DIAGNOSIS — I50.32 CHRONIC DIASTOLIC HEART FAILURE WITH PRESERVED EJECTION FRACTION (MULTI): ICD-10-CM

## 2023-10-04 PROCEDURE — 3077F SYST BP >= 140 MM HG: CPT | Performed by: INTERNAL MEDICINE

## 2023-10-04 PROCEDURE — 1160F RVW MEDS BY RX/DR IN RCRD: CPT | Performed by: INTERNAL MEDICINE

## 2023-10-04 PROCEDURE — 1036F TOBACCO NON-USER: CPT | Performed by: INTERNAL MEDICINE

## 2023-10-04 PROCEDURE — 99215 OFFICE O/P EST HI 40 MIN: CPT | Performed by: INTERNAL MEDICINE

## 2023-10-04 PROCEDURE — 1126F AMNT PAIN NOTED NONE PRSNT: CPT | Performed by: INTERNAL MEDICINE

## 2023-10-04 PROCEDURE — 3078F DIAST BP <80 MM HG: CPT | Performed by: INTERNAL MEDICINE

## 2023-10-04 PROCEDURE — 90662 IIV NO PRSV INCREASED AG IM: CPT | Performed by: INTERNAL MEDICINE

## 2023-10-04 PROCEDURE — 1159F MED LIST DOCD IN RCRD: CPT | Performed by: INTERNAL MEDICINE

## 2023-10-04 PROCEDURE — G0008 ADMIN INFLUENZA VIRUS VAC: HCPCS | Performed by: INTERNAL MEDICINE

## 2023-10-04 RX ORDER — HYDROCHLOROTHIAZIDE 12.5 MG/1
12.5 TABLET ORAL DAILY
COMMUNITY
Start: 2023-08-23 | End: 2023-10-04 | Stop reason: ALTCHOICE

## 2023-10-04 RX ORDER — DENOSUMAB 60 MG/ML
60 INJECTION SUBCUTANEOUS
Qty: 1 ML | Refills: 1 | Status: SHIPPED | OUTPATIENT
Start: 2023-10-04 | End: 2023-10-06 | Stop reason: SDUPTHER

## 2023-10-04 SDOH — ECONOMIC STABILITY: FOOD INSECURITY: WITHIN THE PAST 12 MONTHS, THE FOOD YOU BOUGHT JUST DIDN'T LAST AND YOU DIDN'T HAVE MONEY TO GET MORE.: NEVER TRUE

## 2023-10-04 SDOH — ECONOMIC STABILITY: FOOD INSECURITY: WITHIN THE PAST 12 MONTHS, YOU WORRIED THAT YOUR FOOD WOULD RUN OUT BEFORE YOU GOT MONEY TO BUY MORE.: NEVER TRUE

## 2023-10-04 ASSESSMENT — LIFESTYLE VARIABLES
HOW OFTEN DO YOU HAVE A DRINK CONTAINING ALCOHOL: NEVER
AUDIT-C TOTAL SCORE: 0
HOW MANY STANDARD DRINKS CONTAINING ALCOHOL DO YOU HAVE ON A TYPICAL DAY: PATIENT DOES NOT DRINK
SKIP TO QUESTIONS 9-10: 1
HOW OFTEN DO YOU HAVE SIX OR MORE DRINKS ON ONE OCCASION: NEVER

## 2023-10-04 ASSESSMENT — PATIENT HEALTH QUESTIONNAIRE - PHQ9
1. LITTLE INTEREST OR PLEASURE IN DOING THINGS: NOT AT ALL
2. FEELING DOWN, DEPRESSED OR HOPELESS: NOT AT ALL
SUM OF ALL RESPONSES TO PHQ9 QUESTIONS 1 & 2: 0

## 2023-10-04 ASSESSMENT — PAIN SCALES - GENERAL: PAINLEVEL: 0-NO PAIN

## 2023-10-04 NOTE — ASSESSMENT & PLAN NOTE
TG and LDL are elevated, patient is 89 years old, will treat hyperglycemia initially, see if TG level falls, recheck labs in 4-6 months

## 2023-10-04 NOTE — PROGRESS NOTES
Chief Complaint/HPI:    6-months follow-up.    Patient complains of ongoing dizziness, lightheadedness, and unsteadiness on her feet. She reports that her symptoms developed upon beginning hydrochlorothiazide. No prior history of issues with Furosemide. No tinnitus. No consistent with her typical vertigo-like symptoms. Patient seems to get lightheaded in the morning, patient states that she has felt lightheaded since taking hydrochlorothiazide .    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 No recent falls or head traumas.     pSVT: No recent complaints. She is seen by Cardiology, Dr. Aguirre.    HTN: She is currently on Carvedilol 25 mg, Amlodipine 5 mg, and hydrochlorothiazide 12.5 mg daily. She continues to follow-up with Cardiology, Dr. Aguirre.    Renal insufficiency: She is followed by Nephrology, Dr. Brewster. BUN 34, serum creatinine 1.21, GFR 41.    Hypothyroidism: TSH 3.15. She is currently on Levothyroxine 50 mcg. She is aware of taking the medication on an empty stomach prior to breakfast.     HLD: Lipid panel performed, most recently, on 9/26 showcasing: Chol 203, HDL 32, , and Tri 234. She is not on any cholesterol-managing medications. She is amenable to beginning statin-therapy. She was informed that a trial of Vascepa would be started if ongoing hypertriglyceridemia on follow-up lab-work.    Osteoporosis: She was previously on Prolia and Raloxifene 60 mg daily. No recent DEXA scans to review.    Anxiety: She was previously on Paroxetine    Vitamin B-12/D deficiency: She is currently taking OTC Vitamin b-12 1,000 mcg tablets and D3 10,000 international units capsules taken every other day. Vitamin D level 27.2, Vitamin B-12 1,299.    Hyperglycemia: patient has never been treated for DM, she is noted to have some elevated glucoses on recent blood testing    ROS otherwise negative aside from what was mentioned above in HPI.      Patient Active Problem List   Diagnosis    Abnormal  stress test    Acute kidney injury (CMS/HCC)    Anxiety    Atypical chest pain    Benign paroxysmal positional vertigo    Bilateral impacted cerumen    Bone loss    Chest pain    Chronic diastolic heart failure with preserved ejection fraction (CMS/HCC)    Chronic renal impairment, stage 2 (mild)    Dizziness    Essential hypertension, benign    Fixed dilated pupil of left eye    Frequent PVCs    Hyperlipidemia    Hypothyroidism    Lightheadedness    Orthostatic dizziness    Osteoporosis    PAC (premature atrial contraction)    Paroxysmal SVT (supraventricular tachycardia)    Renal insufficiency    Syncope    Transient LOC (loss of consciousness)    Traumatic cerebral hemorrhage (CMS/HCC)    Unsteady gait    Vertigo, benign positional    Vitamin B12 deficiency    Chronic pain    Binocular vision disorder with diplopia    Breast cyst    Hypertension    Coccyx pain    Iliotibial band syndrome of right side    Pancreas cyst    Ptosis of eyelid, left    Spinal stenosis, lumbar region, without neurogenic claudication    Spondylosis without myelopathy or radiculopathy, lumbar region    Trochanteric bursitis of right hip    Other herpes zoster eye disease    Primary osteoarthritis of left knee    Atrial fibrillation (CMS/HCC)    Benign paroxysmal positional vertigo of right ear    Hyperglycemia         History reviewed. No pertinent past medical history.  Past Surgical History:   Procedure Laterality Date    OTHER SURGICAL HISTORY  12/10/2019    Hysterectomy    OTHER SURGICAL HISTORY  12/10/2019    Lumpectomy     Social History     Social History Narrative    Not on file         ALLERGIES  Lisinopril      MEDICATIONS  Current Outpatient Medications on File Prior to Visit   Medication Sig Dispense Refill    amLODIPine (Norvasc) 5 mg tablet Take 1 half tablet by mouth once daily.      aspirin 81 mg capsule Take 1 tablet by mouth once daily.      carvedilol (Coreg) 25 mg tablet Take 1 tablet (25 mg) by mouth 2 times a day.  180 tablet 3    cholecalciferol, vitamin D3, 250 mcg (10,000 unit) capsule Take 1 capsule (250 mcg) by mouth once daily.      cyanocobalamin (Vitamin B-12) 1,000 mcg tablet Take 1 tablet (1,000 mcg) by mouth every other day.      [DISCONTINUED] hydroCHLOROthiazide (HYDRODiuril) 12.5 mg tablet Take 1 tablet (12.5 mg) by mouth once daily.      irbesartan (Avapro) 150 mg tablet Take 1 tablet (150 mg) by mouth once daily.      levothyroxine (Euthyrox) 50 mcg tablet Take 1 tablet (50 mcg) by mouth once daily in the morning. Take before meals. 90 tablet 3    [DISCONTINUED] aspirin-calcium carbonate 81 mg-300 mg calcium(777 mg) tablet Take 1 tablet by mouth once daily.      [DISCONTINUED] denosumab (Prolia) 60 mg/mL syringe Inject 1 mL (60 mg) under the skin every 6 months.      [DISCONTINUED] FENOFIBRATE MICRONIZED ORAL Take by mouth.      [DISCONTINUED] furosemide (Lasix) 20 mg tablet Take 1 tablet (20 mg) by mouth.      [DISCONTINUED] hydroCHLOROthiazide (HYDRODiuril) 25 mg tablet Take 1 tablet (25 mg) by mouth once daily.      [DISCONTINUED] PARoxetine (Paxil) 20 mg tablet Take 1 tablet (20 mg) by mouth once daily. 90 tablet 1    [DISCONTINUED] raloxifene (Evista) 60 mg tablet Take 1 tablet (60 mg) by mouth once daily.      [DISCONTINUED] verapamil ER (Veralan PM) 240 mg 24 hr capsule Take 1 capsule (240 mg) by mouth once daily at bedtime.       No current facility-administered medications on file prior to visit.         PHYSICAL EXAM  /72 (BP Location: Left arm, Patient Position: Sitting, BP Cuff Size: Adult)   Pulse 88   Temp 36.1 °C (96.9 °F)   Resp 16   Ht 1.524 m (5')   Wt 56.2 kg (124 lb)   SpO2 95%   BMI 24.22 kg/m²   Body mass index is 24.22 kg/m².  Constitutional:       Appearance: Normal appearance. She is normal weight. She is not ill-appearing. Accompanied by daughter  FLASH:      Head: Normocephalic and atraumatic. No neck masses or thyromegaly     Right Ear: External ear normal.      Left Ear:  External ear normal.      Eyes:      Extraocular Movements: Extraocular movements intact.      Conjunctiva/sclera: Conjunctivae normal.   Cardiovascular:      Rate and Rhythm: regular rate present. Rhythm irregular.      Pulses: Normal pulses.      Heart sounds: Normal heart sounds.      Comments: Irregular rate and rhythm today noted, patient feels well this is not new  Pulmonary:      Effort: Pulmonary effort is normal.      Breath sounds: Normal breath sounds. No wheezes, no rhonchi, no rales  Chest:      Chest wall: No tenderness.   Musculoskeletal:         General: Normal range of motion.      Cervical back: Normal range of motion and neck supple. No tenderness.      Right lower leg: No edema.      Left lower leg: No edema.   Lymphadenopathy:      Cervical: No cervical adenopathy.   Skin:     General: Skin is warm and dry.      Findings: No rash.   Neurological:      General: No focal deficit present.      Mental Status: She is alert and oriented to person, place, and time.      Motor: No weakness.      Gait: Gait normal.      Deep Tendon Reflexes: Reflexes normal.   Psychiatric:         Mood and Affect: Mood normal.         Behavior: Behavior normal.         Thought Content: Thought content normal.         Judgment: Judgment normal.     ASSESSMENT/PLAN  Problem List Items Addressed This Visit       Chronic diastolic heart failure with preserved ejection fraction (CMS/HCC)    Relevant Medications    empagliflozin (Jardiance) 10 mg    Other Relevant Orders    Comprehensive Metabolic Panel    Chronic renal impairment, stage 2 (mild)    Relevant Medications    empagliflozin (Jardiance) 10 mg    Other Relevant Orders    Comprehensive Metabolic Panel    Essential hypertension, benign    Current Assessment & Plan     Patient feels lightheaded when taking the hydrochlorothiazide, stop the hydrochlorothiazide, will try a low dose Jardiance to reduce glucoses, reduce BP, treat any diastolic HF and to treat CKD.           Relevant Orders    Comprehensive Metabolic Panel    Albumin , Urine Random    Hyperlipidemia    Current Assessment & Plan     TG and LDL are elevated, patient is 89 years old, will treat hyperglycemia initially, see if TG level falls, recheck labs in 4-6 months         Relevant Orders    Comprehensive Metabolic Panel    Hypothyroidism    Current Assessment & Plan     Stable , continue to monitor         Relevant Orders    TSH with reflex to Free T4 if abnormal    Comprehensive Metabolic Panel    Osteoporosis - Primary    Current Assessment & Plan     Patient should be getting Prolia, will reorder to be given at Infusion center         Relevant Medications    denosumab (Prolia) 60 mg/mL syringe    Other Relevant Orders    Comprehensive Metabolic Panel    Vitamin D 25-Hydroxy,Total (for eval of Vitamin D levels)    Traumatic cerebral hemorrhage (CMS/HCC)    Current Assessment & Plan     Stable now, no med changes         Relevant Orders    Comprehensive Metabolic Panel    Atrial fibrillation (CMS/HCC)    Current Assessment & Plan     Patient does follow up with cardiology, she Takes ASA only at present time         Relevant Orders    Comprehensive Metabolic Panel    Hyperglycemia    Current Assessment & Plan     Patient may have DM, she has elevated TG level, she has had glucoses  elevated on recent fasting labs. Check a HgbA1C, current GFR is 41. Trial of low dose Jardiance, stop the HCTZ          Relevant Medications    empagliflozin (Jardiance) 10 mg    Other Relevant Orders    Hemoglobin A1c    Hemoglobin A1C    Comprehensive Metabolic Panel    Albumin , Urine Random     Recheck labs in 6 months, check HgbA1C soon, then recheck labs in 6 months    Sulaiman Santoyo MD

## 2023-10-04 NOTE — ASSESSMENT & PLAN NOTE
Patient feels lightheaded when taking the hydrochlorothiazide, stop the hydrochlorothiazide, will try a low dose Jardiance to reduce glucoses, reduce BP, treat any diastolic HF and to treat CKD.

## 2023-10-04 NOTE — ASSESSMENT & PLAN NOTE
Patient may have DM, she has elevated TG level, she has had glucoses  elevated on recent fasting labs. Check a HgbA1C, current GFR is 41. Trial of low dose Jardiance, stop the HCTZ

## 2023-10-05 ENCOUNTER — TELEPHONE (OUTPATIENT)
Dept: PRIMARY CARE | Facility: CLINIC | Age: 88
End: 2023-10-05
Payer: COMMERCIAL

## 2023-10-05 DIAGNOSIS — M81.0 AGE-RELATED OSTEOPOROSIS WITHOUT CURRENT PATHOLOGICAL FRACTURE: Primary | ICD-10-CM

## 2023-10-05 DIAGNOSIS — M81.0 OSTEOPOROSIS, UNSPECIFIED OSTEOPOROSIS TYPE, UNSPECIFIED PATHOLOGICAL FRACTURE PRESENCE: ICD-10-CM

## 2023-10-05 NOTE — TELEPHONE ENCOUNTER
Pt tried to schedule the bone density but radiology won't do it since it is past 6 months from the order date. Pt is needing you to resubmit the order?

## 2023-10-06 RX ORDER — DENOSUMAB 60 MG/ML
60 INJECTION SUBCUTANEOUS ONCE
Qty: 1 ML | Refills: 1 | Status: SHIPPED | OUTPATIENT
Start: 2023-10-06 | End: 2023-10-11

## 2023-10-12 DIAGNOSIS — M81.0 AGE-RELATED OSTEOPOROSIS WITHOUT CURRENT PATHOLOGICAL FRACTURE: Primary | ICD-10-CM

## 2023-10-13 ENCOUNTER — TELEPHONE (OUTPATIENT)
Dept: PRIMARY CARE | Facility: CLINIC | Age: 88
End: 2023-10-13
Payer: COMMERCIAL

## 2023-10-13 DIAGNOSIS — M81.0 AGE-RELATED OSTEOPOROSIS WITHOUT CURRENT PATHOLOGICAL FRACTURE: Primary | ICD-10-CM

## 2023-10-13 RX ORDER — DIPHENHYDRAMINE HYDROCHLORIDE 50 MG/ML
50 INJECTION INTRAMUSCULAR; INTRAVENOUS AS NEEDED
Status: CANCELLED | OUTPATIENT
Start: 2023-10-13

## 2023-10-13 RX ORDER — EPINEPHRINE 0.3 MG/.3ML
0.3 INJECTION SUBCUTANEOUS EVERY 5 MIN PRN
Status: CANCELLED | OUTPATIENT
Start: 2023-10-13

## 2023-10-13 RX ORDER — ALBUTEROL SULFATE 0.83 MG/ML
3 SOLUTION RESPIRATORY (INHALATION) AS NEEDED
Status: CANCELLED | OUTPATIENT
Start: 2023-10-13

## 2023-10-13 RX ORDER — FAMOTIDINE 10 MG/ML
20 INJECTION INTRAVENOUS ONCE AS NEEDED
Status: CANCELLED | OUTPATIENT
Start: 2023-10-13

## 2023-10-13 NOTE — TELEPHONE ENCOUNTER
PT CALLED AND STATED THAT SHE CALLED TO GET HER PROLIA INJECTION, BUT IT IS . CAN YOU PLEASE RESEND TO THE INFUSION CENTER.

## 2023-10-23 DIAGNOSIS — F41.9 ANXIETY: ICD-10-CM

## 2023-10-23 RX ORDER — PAROXETINE HYDROCHLORIDE 20 MG/1
20 TABLET, FILM COATED ORAL DAILY
Qty: 90 TABLET | Refills: 3 | Status: SHIPPED | OUTPATIENT
Start: 2023-10-23

## 2023-10-23 RX ORDER — CYANOCOBALAMIN 1000 UG/ML
1000 INJECTION, SOLUTION INTRAMUSCULAR; SUBCUTANEOUS
COMMUNITY
End: 2024-02-08 | Stop reason: WASHOUT

## 2023-10-30 ENCOUNTER — APPOINTMENT (OUTPATIENT)
Dept: INFUSION THERAPY | Facility: HOSPITAL | Age: 88
End: 2023-10-30
Payer: COMMERCIAL

## 2023-11-22 ENCOUNTER — ANCILLARY PROCEDURE (OUTPATIENT)
Dept: RADIOLOGY | Facility: CLINIC | Age: 88
End: 2023-11-22
Payer: COMMERCIAL

## 2023-11-22 DIAGNOSIS — M81.0 AGE-RELATED OSTEOPOROSIS WITHOUT CURRENT PATHOLOGICAL FRACTURE: ICD-10-CM

## 2023-11-22 PROCEDURE — 77080 DXA BONE DENSITY AXIAL: CPT

## 2023-11-22 PROCEDURE — 77080 DXA BONE DENSITY AXIAL: CPT | Performed by: RADIOLOGY

## 2023-11-29 ENCOUNTER — TELEPHONE (OUTPATIENT)
Dept: PRIMARY CARE | Facility: CLINIC | Age: 88
End: 2023-11-29
Payer: COMMERCIAL

## 2023-11-29 DIAGNOSIS — M81.0 AGE-RELATED OSTEOPOROSIS WITHOUT CURRENT PATHOLOGICAL FRACTURE: Primary | ICD-10-CM

## 2023-11-29 NOTE — TELEPHONE ENCOUNTER
Patient states that Dr CABRAL placed an infusion for a prolia shot. Patient states that the insurance denied because she needed to have a bone density first. Patient states that she had a bone density a week ago and she needs a reorder for the prolia shot / infusion.

## 2023-12-06 ENCOUNTER — APPOINTMENT (OUTPATIENT)
Dept: INFUSION THERAPY | Facility: HOSPITAL | Age: 88
End: 2023-12-06
Payer: COMMERCIAL

## 2023-12-15 ENCOUNTER — APPOINTMENT (OUTPATIENT)
Dept: INFUSION THERAPY | Facility: HOSPITAL | Age: 88
End: 2023-12-15
Payer: COMMERCIAL

## 2023-12-18 ENCOUNTER — LAB (OUTPATIENT)
Dept: LAB | Facility: LAB | Age: 88
End: 2023-12-18
Payer: COMMERCIAL

## 2023-12-18 DIAGNOSIS — M81.0 AGE-RELATED OSTEOPOROSIS WITHOUT CURRENT PATHOLOGICAL FRACTURE: ICD-10-CM

## 2023-12-18 PROCEDURE — 36415 COLL VENOUS BLD VENIPUNCTURE: CPT

## 2023-12-18 PROCEDURE — 82330 ASSAY OF CALCIUM: CPT

## 2023-12-19 LAB — CA-I BLD-SCNC: 1.2 MMOL/L (ref 1.1–1.33)

## 2023-12-20 ENCOUNTER — INFUSION (OUTPATIENT)
Dept: INFUSION THERAPY | Facility: HOSPITAL | Age: 88
End: 2023-12-20
Payer: COMMERCIAL

## 2023-12-20 VITALS
SYSTOLIC BLOOD PRESSURE: 156 MMHG | TEMPERATURE: 98.8 F | OXYGEN SATURATION: 94 % | DIASTOLIC BLOOD PRESSURE: 96 MMHG | RESPIRATION RATE: 18 BRPM | HEART RATE: 72 BPM

## 2023-12-20 DIAGNOSIS — M81.0 AGE-RELATED OSTEOPOROSIS WITHOUT CURRENT PATHOLOGICAL FRACTURE: ICD-10-CM

## 2023-12-20 PROCEDURE — 96372 THER/PROPH/DIAG INJ SC/IM: CPT | Performed by: INTERNAL MEDICINE

## 2023-12-20 PROCEDURE — 2500000004 HC RX 250 GENERAL PHARMACY W/ HCPCS (ALT 636 FOR OP/ED): Mod: JZ | Performed by: INTERNAL MEDICINE

## 2023-12-20 RX ORDER — FAMOTIDINE 10 MG/ML
20 INJECTION INTRAVENOUS ONCE AS NEEDED
OUTPATIENT
Start: 2024-06-15

## 2023-12-20 RX ORDER — ALBUTEROL SULFATE 0.83 MG/ML
3 SOLUTION RESPIRATORY (INHALATION) AS NEEDED
OUTPATIENT
Start: 2024-06-15

## 2023-12-20 RX ORDER — EPINEPHRINE 0.3 MG/.3ML
0.3 INJECTION SUBCUTANEOUS EVERY 5 MIN PRN
OUTPATIENT
Start: 2024-06-15

## 2023-12-20 RX ORDER — DIPHENHYDRAMINE HYDROCHLORIDE 50 MG/ML
50 INJECTION INTRAMUSCULAR; INTRAVENOUS AS NEEDED
OUTPATIENT
Start: 2024-06-15

## 2023-12-20 RX ADMIN — DENOSUMAB 60 MG: 60 INJECTION SUBCUTANEOUS at 13:32

## 2023-12-20 ASSESSMENT — ENCOUNTER SYMPTOMS
DEPRESSION: 0
OCCASIONAL FEELINGS OF UNSTEADINESS: 1
LOSS OF SENSATION IN FEET: 0

## 2023-12-20 ASSESSMENT — COLUMBIA-SUICIDE SEVERITY RATING SCALE - C-SSRS
6. HAVE YOU EVER DONE ANYTHING, STARTED TO DO ANYTHING, OR PREPARED TO DO ANYTHING TO END YOUR LIFE?: NO
2. HAVE YOU ACTUALLY HAD ANY THOUGHTS OF KILLING YOURSELF?: NO
1. IN THE PAST MONTH, HAVE YOU WISHED YOU WERE DEAD OR WISHED YOU COULD GO TO SLEEP AND NOT WAKE UP?: NO

## 2023-12-20 ASSESSMENT — PATIENT HEALTH QUESTIONNAIRE - PHQ9
2. FEELING DOWN, DEPRESSED OR HOPELESS: NOT AT ALL
SUM OF ALL RESPONSES TO PHQ9 QUESTIONS 1 AND 2: 0
1. LITTLE INTEREST OR PLEASURE IN DOING THINGS: NOT AT ALL

## 2024-01-30 DIAGNOSIS — E03.9 HYPOTHYROIDISM, UNSPECIFIED TYPE: ICD-10-CM

## 2024-01-30 DIAGNOSIS — I50.32 CHRONIC DIASTOLIC HEART FAILURE WITH PRESERVED EJECTION FRACTION (MULTI): ICD-10-CM

## 2024-01-30 DIAGNOSIS — N18.2 CHRONIC RENAL IMPAIRMENT, STAGE 2 (MILD): ICD-10-CM

## 2024-01-30 DIAGNOSIS — I10 ESSENTIAL HYPERTENSION, BENIGN: Primary | ICD-10-CM

## 2024-01-30 DIAGNOSIS — R73.9 HYPERGLYCEMIA: ICD-10-CM

## 2024-01-30 RX ORDER — EMPAGLIFLOZIN 10 MG/1
10 TABLET, FILM COATED ORAL DAILY
Qty: 90 TABLET | Refills: 3 | Status: SHIPPED | OUTPATIENT
Start: 2024-01-30

## 2024-01-30 RX ORDER — AMLODIPINE BESYLATE 5 MG/1
2.5 TABLET ORAL DAILY
Qty: 45 TABLET | Refills: 3 | Status: SHIPPED | OUTPATIENT
Start: 2024-01-30 | End: 2024-02-08 | Stop reason: SDUPTHER

## 2024-01-30 RX ORDER — LEVOTHYROXINE SODIUM 50 UG/1
TABLET ORAL
Refills: 0 | OUTPATIENT
Start: 2024-01-30

## 2024-02-02 NOTE — PROGRESS NOTES
Chief Complaint/Reason for Visit:   Patient is coming in today as a 6 month Cardiovascular follow up.     History Of Present Illness:    Ms. Salas is coming today as a 6-month cardiovascular follow-up.  We have followed this patient previously for hypertension, female paroxysmal supraventricular tachycardia, syncope, hyperlipidemia, and chronic diastolic heart failure.  Patient's blood pressure historically has been somewhat difficult to manage.    Patient comes in today using her wheeled walker and accompanied by her daughter.  She denies any recent hospitalizations or emergency room visits.  At her PCP appointment this fall, she was taken off of hydrochlorothiazide due to lightheadedness.  She reports that her lightheadedness did not seem to improve.  Patient also has recently noticed increased ankle edema for the past week.  She admits to eating some salty snacks.  She is taking her medication as prescribed.  Home blood pressures are generally well-controlled, yesterday she was 119/78.     Echocardiogram November, 2022 shows normal LV systolic function, EF 65-70%, positive diastolic dysfunction. Normal RV size and function, no significant valvular abnormalities.       Past Medical History:  She has no past medical history on file.    Past Surgical History:  She has a past surgical history that includes Other surgical history (12/10/2019) and Other surgical history (12/10/2019).      Social History:  She reports that she has never smoked. She has never been exposed to tobacco smoke. She has never used smokeless tobacco. She reports that she does not drink alcohol and does not use drugs.    Family History:  No family history on file.     Allergies:  Lisinopril    Medications:  Current Outpatient Medications   Medication Instructions    amLODIPine (NORVASC) 5 mg, oral, Daily    carvedilol (COREG) 25 mg, oral, 2 times daily    cholecalciferol, vitamin D3, 250 mcg (10,000 unit) capsule 1 capsule, oral, Daily     Jardiance 10 mg, oral, Daily    levothyroxine (EUTHYROX) 50 mcg, oral, Daily before breakfast    PARoxetine (PAXIL) 20 mg, oral, Daily    Prolia 60 mg, subcutaneous, Once       Review of Systems:  Constitutional: + for fatigue, not feeling poorly.   Eyes: no eyesight problems.   ENT: no hearing loss.   Cardiovascular: lower extremity edema, but~no chest pain,~no tightness or heavy pressure,~no shortness of breath~and~no palpitations.   Respiratory: shortness of breath during exertion, but~no shortness of breath at rest~and~no orthopnea.   Gastrointestinal: no change in bowel habits~and~no blood in stools.   Genitourinary: no hematuria.   Musculoskeletal: no arthralgias~and~no myalgias.   Skin: no skin rashes.   Neurological:Has ongoing lightheadedness . Recent tx for Vertigo, seeing ENT.   Psychiatric: no confusion~and~no memory lapses or loss.   Endocrine: thyroid disorder, but~no diabetes mellitus.     Vitals  Visit Vitals  /70   Pulse 84   Wt 59 kg (130 lb)   BMI 25.39 kg/m²   Smoking Status Never   BSA 1.58 m²        Physical Exam:   Constitutional: alert and in no acute distress.   Eyes: no erythema, swelling or discharge from the eye .   Ears, Nose, Mouth, and Throat:. mask.   Neck: neck is supple, symmetric, trachea midline, no masses . No JVD   Pulmonary: no increased work of breathing or signs of respiratory distress ~and~lungs clear to auscultation.    Cardiovascular: carotid pulses 2+ bilaterally with no bruit ,~JVP was normal,~ the heart rate was normal the rhythm was irregularly irregular,~normal S1,~normal S2,~no S3,~no S4 no murmurs were heard.~and~pedal pulses 2+ bilaterally ~. 1+ ankle edema.   Abdomen: abdomen non-tender, no masses .   Musculoskeletal:. Uses wheeled walker, balance issues.   Skin: skin warm and dry, normal skin turgor .   Neurologic: non-focal neurologic examination.   Psychiatric oriented to person, place and time ~and~normal mood and affect     Last Labs:  CBC -  Lab  Results   Component Value Date    WBC 6.5 09/26/2023    HGB 14.3 09/26/2023    HCT 44.6 09/26/2023    MCV 91 09/26/2023     09/26/2023     Lab Results   Component Value Date    GLUCOSE 122 (H) 09/26/2023    CALCIUM 9.8 09/26/2023     09/26/2023    K 4.3 09/26/2023    CO2 31 09/26/2023     09/26/2023    BUN 34 (H) 09/26/2023    CREATININE 1.21 (H) 09/26/2023      CMP -  Lab Results   Component Value Date    CALCIUM 9.8 09/26/2023    PHOS 3.3 08/30/2022    PROT 7.0 09/26/2023    ALBUMIN 4.6 09/26/2023    AST 14 09/26/2023    ALT 9 09/26/2023    ALKPHOS 53 09/26/2023    BILITOT 1.2 09/26/2023       LIPID PANEL -   Lab Results   Component Value Date    CHOL 203 (H) 09/26/2023    TRIG 234 (H) 09/26/2023    HDL 32.0 (A) 09/26/2023    CHHDL 6.3 (A) 09/26/2023    LDLF 124 (H) 09/26/2023    VLDL 47 (H) 09/26/2023    NHDL 171 09/26/2023       Lab Results   Component Value Date     (H) 06/27/2023       Last Cardiology Tests:    Echo:11-25-22  CONCLUSIONS:   1. Left ventricular systolic function is normal with a 65-70% estimated ejection fraction.   2. Spectral Doppler shows an impaired relaxation pattern of left ventricular diastolic filling.    Stress Test:4-8-21     IMPRESSION:  1. Normal stress myocardial perfusion imaging in response to  pharmacologic stress.  2. Well-maintained left ventricular function.   Summary:   1. No clinical or electrocardiographic evidence for ischemia at maximal infusion.   2. Normal Stress Test.   3. Correlate with myocardial perfusion imaging results.   4. Nuclear image results are reported separately.          Lab review: I have personally reviewed the laboratory result(s)     Assessment/Plan:  Persistent atrial fibrillation: Patient has chronic, rate controlled atrial fibrillation.  Heart rates today are in the 80s.  She is on carvedilol 25 mg twice daily.  Patient has an elevated [id number] score but has refused anticoagulation even after we discussed the risks.   Patient will continue on her current dose of carvedilol.    Hypertension: At the patient's office visit with me in October, her blood pressure was suboptimally controlled.  I added hydrochlorothiazide 12.5 mg daily which was discontinued in October by her PCP due to complaints of lightheadedness.  Patient is seen no significant improvement.  Her blood pressure at this point does not need additional blood pressure lowering medications.  Home blood pressures have been good.  Blood pressure in the office today was 138/70 after she sat and rested.  She will continue on amlodipine and carvedilol.  If desired, we could consider trying to reduce the amlodipine dose to help with her ankle edema and lightheadedness but I am not sure that it would make a huge difference.  The patient and her daughter decided to continue on her current regimen.  I stressed the importance of reducing the sodium in her diet, she should be on no more than 2000 mg/day.  She should also elevate her legs when resting.    Chronic diastolic heart failure: Patient has mild ankle edema.  Her lungs are clear, she has no JVD.  At this point, I do not believe she requires diuretics.  She is reasonably compensated.    Patient will be scheduled to follow-up with Dr. Aguirre in 6 months.  She has an appointment to see her PCP in April and will be getting blood work at that time.  Patient instructed to call with any cardiovascular complaints. All questions were answered.       Dragon dictation was utilized to create this document. Quite often unanticipated grammatical, syntax,  and other interpretive errors are inadvertently transcribed by the computer software.  Please disregard these errors.  Please excuse any errors that have escaped final proofreading.             Shannen Hughes, APRN-CNP

## 2024-02-08 ENCOUNTER — OFFICE VISIT (OUTPATIENT)
Dept: CARDIOLOGY | Facility: CLINIC | Age: 89
End: 2024-02-08
Payer: COMMERCIAL

## 2024-02-08 VITALS
DIASTOLIC BLOOD PRESSURE: 70 MMHG | WEIGHT: 130 LBS | SYSTOLIC BLOOD PRESSURE: 138 MMHG | BODY MASS INDEX: 25.39 KG/M2 | HEART RATE: 84 BPM

## 2024-02-08 DIAGNOSIS — I10 ESSENTIAL HYPERTENSION, BENIGN: ICD-10-CM

## 2024-02-08 DIAGNOSIS — I50.32 CHRONIC DIASTOLIC HEART FAILURE WITH PRESERVED EJECTION FRACTION (MULTI): ICD-10-CM

## 2024-02-08 DIAGNOSIS — I48.91 ATRIAL FIBRILLATION, UNSPECIFIED TYPE (MULTI): ICD-10-CM

## 2024-02-08 DIAGNOSIS — E78.5 HYPERLIPIDEMIA, UNSPECIFIED HYPERLIPIDEMIA TYPE: ICD-10-CM

## 2024-02-08 DIAGNOSIS — R07.2 PRECORDIAL PAIN: Primary | ICD-10-CM

## 2024-02-08 DIAGNOSIS — I48.11 LONGSTANDING PERSISTENT ATRIAL FIBRILLATION (MULTI): ICD-10-CM

## 2024-02-08 PROBLEM — H90.3 SENSORINEURAL HEARING LOSS (SNHL) OF BOTH EARS: Status: ACTIVE | Noted: 2022-03-16

## 2024-02-08 PROBLEM — H93.13 BILATERAL TINNITUS: Status: ACTIVE | Noted: 2022-02-09

## 2024-02-08 PROCEDURE — 1159F MED LIST DOCD IN RCRD: CPT | Performed by: NURSE PRACTITIONER

## 2024-02-08 PROCEDURE — 1126F AMNT PAIN NOTED NONE PRSNT: CPT | Performed by: NURSE PRACTITIONER

## 2024-02-08 PROCEDURE — 1036F TOBACCO NON-USER: CPT | Performed by: NURSE PRACTITIONER

## 2024-02-08 PROCEDURE — 3078F DIAST BP <80 MM HG: CPT | Performed by: NURSE PRACTITIONER

## 2024-02-08 PROCEDURE — 99213 OFFICE O/P EST LOW 20 MIN: CPT | Performed by: NURSE PRACTITIONER

## 2024-02-08 PROCEDURE — 3075F SYST BP GE 130 - 139MM HG: CPT | Performed by: NURSE PRACTITIONER

## 2024-02-08 PROCEDURE — 1160F RVW MEDS BY RX/DR IN RCRD: CPT | Performed by: NURSE PRACTITIONER

## 2024-02-08 RX ORDER — AMLODIPINE BESYLATE 5 MG/1
5 TABLET ORAL DAILY
Qty: 45 TABLET | Refills: 3 | COMMUNITY
Start: 2024-02-08 | End: 2024-05-26

## 2024-02-08 NOTE — PATIENT INSTRUCTIONS
Continue on current meds  Heart healthy, low sodium diet  Mediterranean diet is recommended  Labs per your PCP are due this winter  Follow up with Dr Aguirre in 6 months

## 2024-02-29 DIAGNOSIS — E03.9 HYPOTHYROIDISM, UNSPECIFIED TYPE: ICD-10-CM

## 2024-02-29 RX ORDER — LEVOTHYROXINE SODIUM 50 UG/1
50 TABLET ORAL
Qty: 90 TABLET | Refills: 3 | Status: SHIPPED | OUTPATIENT
Start: 2024-02-29

## 2024-04-11 ENCOUNTER — LAB (OUTPATIENT)
Dept: LAB | Facility: LAB | Age: 89
End: 2024-04-11
Payer: COMMERCIAL

## 2024-04-11 DIAGNOSIS — R73.9 HYPERGLYCEMIA: ICD-10-CM

## 2024-04-11 DIAGNOSIS — S06.36AS: ICD-10-CM

## 2024-04-11 DIAGNOSIS — E78.5 HYPERLIPIDEMIA, UNSPECIFIED HYPERLIPIDEMIA TYPE: ICD-10-CM

## 2024-04-11 DIAGNOSIS — I48.91 ATRIAL FIBRILLATION, UNSPECIFIED TYPE (MULTI): ICD-10-CM

## 2024-04-11 DIAGNOSIS — I10 ESSENTIAL HYPERTENSION, BENIGN: ICD-10-CM

## 2024-04-11 DIAGNOSIS — I50.32 CHRONIC DIASTOLIC HEART FAILURE WITH PRESERVED EJECTION FRACTION (MULTI): ICD-10-CM

## 2024-04-11 DIAGNOSIS — N18.2 CHRONIC RENAL IMPAIRMENT, STAGE 2 (MILD): ICD-10-CM

## 2024-04-11 DIAGNOSIS — M81.0 AGE-RELATED OSTEOPOROSIS WITHOUT CURRENT PATHOLOGICAL FRACTURE: ICD-10-CM

## 2024-04-11 DIAGNOSIS — E03.9 HYPOTHYROIDISM, UNSPECIFIED TYPE: ICD-10-CM

## 2024-04-11 LAB
25(OH)D3 SERPL-MCNC: >120 NG/ML (ref 30–100)
ALBUMIN SERPL BCP-MCNC: 4.3 G/DL (ref 3.4–5)
ALP SERPL-CCNC: 41 U/L (ref 33–136)
ALT SERPL W P-5'-P-CCNC: 9 U/L (ref 7–45)
ANION GAP SERPL CALC-SCNC: 13 MMOL/L (ref 10–20)
AST SERPL W P-5'-P-CCNC: 14 U/L (ref 9–39)
BILIRUB SERPL-MCNC: 0.9 MG/DL (ref 0–1.2)
BUN SERPL-MCNC: 24 MG/DL (ref 6–23)
CALCIUM SERPL-MCNC: 9 MG/DL (ref 8.6–10.3)
CHLORIDE SERPL-SCNC: 104 MMOL/L (ref 98–107)
CO2 SERPL-SCNC: 28 MMOL/L (ref 21–32)
CREAT SERPL-MCNC: 1.02 MG/DL (ref 0.5–1.05)
EGFRCR SERPLBLD CKD-EPI 2021: 51 ML/MIN/1.73M*2
EST. AVERAGE GLUCOSE BLD GHB EST-MCNC: 128 MG/DL
GLUCOSE SERPL-MCNC: 131 MG/DL (ref 74–99)
HBA1C MFR BLD: 6.1 %
POTASSIUM SERPL-SCNC: 4.4 MMOL/L (ref 3.5–5.3)
PROT SERPL-MCNC: 6.9 G/DL (ref 6.4–8.2)
SODIUM SERPL-SCNC: 141 MMOL/L (ref 136–145)
T4 FREE SERPL-MCNC: 1.2 NG/DL (ref 0.61–1.12)
TSH SERPL-ACNC: 4.26 MIU/L (ref 0.44–3.98)

## 2024-04-11 PROCEDURE — 82306 VITAMIN D 25 HYDROXY: CPT

## 2024-04-11 PROCEDURE — 83036 HEMOGLOBIN GLYCOSYLATED A1C: CPT

## 2024-04-11 PROCEDURE — 80053 COMPREHEN METABOLIC PANEL: CPT

## 2024-04-11 PROCEDURE — 84443 ASSAY THYROID STIM HORMONE: CPT

## 2024-04-11 PROCEDURE — 36415 COLL VENOUS BLD VENIPUNCTURE: CPT

## 2024-04-11 PROCEDURE — 84439 ASSAY OF FREE THYROXINE: CPT

## 2024-04-12 ENCOUNTER — LAB (OUTPATIENT)
Dept: LAB | Facility: LAB | Age: 89
End: 2024-04-12
Payer: COMMERCIAL

## 2024-04-12 LAB
CREAT UR-MCNC: 71 MG/DL (ref 20–320)
MICROALBUMIN UR-MCNC: 106.6 MG/L
MICROALBUMIN/CREAT UR: 150.1 UG/MG CREAT

## 2024-04-12 PROCEDURE — 82043 UR ALBUMIN QUANTITATIVE: CPT

## 2024-04-12 PROCEDURE — 82570 ASSAY OF URINE CREATININE: CPT

## 2024-04-18 ENCOUNTER — OFFICE VISIT (OUTPATIENT)
Dept: PRIMARY CARE | Facility: CLINIC | Age: 89
End: 2024-04-18
Payer: COMMERCIAL

## 2024-04-18 VITALS
OXYGEN SATURATION: 97 % | SYSTOLIC BLOOD PRESSURE: 171 MMHG | TEMPERATURE: 97.5 F | HEIGHT: 60 IN | HEART RATE: 95 BPM | DIASTOLIC BLOOD PRESSURE: 98 MMHG | WEIGHT: 127.5 LBS | BODY MASS INDEX: 25.03 KG/M2 | RESPIRATION RATE: 16 BRPM

## 2024-04-18 DIAGNOSIS — S06.36AS: ICD-10-CM

## 2024-04-18 DIAGNOSIS — N18.30 STAGE 3 CHRONIC KIDNEY DISEASE, UNSPECIFIED WHETHER STAGE 3A OR 3B CKD (MULTI): ICD-10-CM

## 2024-04-18 DIAGNOSIS — J43.9 PULMONARY EMPHYSEMA, UNSPECIFIED EMPHYSEMA TYPE (MULTI): ICD-10-CM

## 2024-04-18 DIAGNOSIS — R73.03 PRE-DIABETES: ICD-10-CM

## 2024-04-18 DIAGNOSIS — I10 PRIMARY HYPERTENSION: ICD-10-CM

## 2024-04-18 DIAGNOSIS — I50.32 CHRONIC DIASTOLIC HEART FAILURE WITH PRESERVED EJECTION FRACTION (MULTI): ICD-10-CM

## 2024-04-18 DIAGNOSIS — E78.5 HYPERLIPIDEMIA, UNSPECIFIED HYPERLIPIDEMIA TYPE: ICD-10-CM

## 2024-04-18 DIAGNOSIS — Z00.00 ROUTINE GENERAL MEDICAL EXAMINATION AT HEALTH CARE FACILITY: Primary | ICD-10-CM

## 2024-04-18 DIAGNOSIS — R79.89 ELEVATED SERUM FREE T4 LEVEL: ICD-10-CM

## 2024-04-18 DIAGNOSIS — S06.361D TRAUMATIC HEMORRHAGE OF CEREBRUM WITH LOSS OF CONSCIOUSNESS OF 30 MINUTES OR LESS, UNSPECIFIED LATERALITY, SUBSEQUENT ENCOUNTER: ICD-10-CM

## 2024-04-18 DIAGNOSIS — S06.361S: ICD-10-CM

## 2024-04-18 DIAGNOSIS — E03.9 ACQUIRED HYPOTHYROIDISM: ICD-10-CM

## 2024-04-18 DIAGNOSIS — R55 TRANSIENT LOC (LOSS OF CONSCIOUSNESS): ICD-10-CM

## 2024-04-18 DIAGNOSIS — E67.3 HIGH VITAMIN D LEVEL: ICD-10-CM

## 2024-04-18 DIAGNOSIS — S06.36AD: ICD-10-CM
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
## 2024-04-18 PROBLEM — H61.20 IMPACTED CERUMEN: Status: RESOLVED | Noted: 2024-04-18 | Resolved: 2024-04-18

## 2024-04-18 PROBLEM — I62.9 INTRACRANIAL HEMORRHAGE (MULTI): Status: ACTIVE | Noted: 2022-11-25

## 2024-04-18 PROBLEM — Z20.822 CONTACT WITH AND (SUSPECTED) EXPOSURE TO COVID-19: Status: RESOLVED | Noted: 2022-11-26 | Resolved: 2024-04-18

## 2024-04-18 PROCEDURE — 3080F DIAST BP >= 90 MM HG: CPT | Performed by: INTERNAL MEDICINE

## 2024-04-18 PROCEDURE — 1159F MED LIST DOCD IN RCRD: CPT | Performed by: INTERNAL MEDICINE

## 2024-04-18 PROCEDURE — G0439 PPPS, SUBSEQ VISIT: HCPCS | Performed by: INTERNAL MEDICINE

## 2024-04-18 PROCEDURE — 3077F SYST BP >= 140 MM HG: CPT | Performed by: INTERNAL MEDICINE

## 2024-04-18 PROCEDURE — 99214 OFFICE O/P EST MOD 30 MIN: CPT | Performed by: INTERNAL MEDICINE

## 2024-04-18 PROCEDURE — 1160F RVW MEDS BY RX/DR IN RCRD: CPT | Performed by: INTERNAL MEDICINE

## 2024-04-18 PROCEDURE — 1036F TOBACCO NON-USER: CPT | Performed by: INTERNAL MEDICINE

## 2024-04-18 PROCEDURE — 1170F FXNL STATUS ASSESSED: CPT | Performed by: INTERNAL MEDICINE

## 2024-04-18 RX ORDER — FUROSEMIDE 40 MG/1
40 TABLET ORAL DAILY
Qty: 30 TABLET | Refills: 2 | Status: SHIPPED | OUTPATIENT
Start: 2024-04-18 | End: 2024-05-13 | Stop reason: SDUPTHER

## 2024-04-18 SDOH — ECONOMIC STABILITY: FOOD INSECURITY: WITHIN THE PAST 12 MONTHS, THE FOOD YOU BOUGHT JUST DIDN'T LAST AND YOU DIDN'T HAVE MONEY TO GET MORE.: NEVER TRUE

## 2024-04-18 SDOH — ECONOMIC STABILITY: FOOD INSECURITY: WITHIN THE PAST 12 MONTHS, YOU WORRIED THAT YOUR FOOD WOULD RUN OUT BEFORE YOU GOT MONEY TO BUY MORE.: NEVER TRUE

## 2024-04-18 ASSESSMENT — ACTIVITIES OF DAILY LIVING (ADL)
GROCERY_SHOPPING: TOTAL CARE
TAKING_MEDICATION: INDEPENDENT
MANAGING_FINANCES: INDEPENDENT
BATHING: INDEPENDENT
DRESSING: INDEPENDENT
DOING_HOUSEWORK: INDEPENDENT

## 2024-04-18 ASSESSMENT — LIFESTYLE VARIABLES
HOW OFTEN DO YOU HAVE SIX OR MORE DRINKS ON ONE OCCASION: NEVER
AUDIT-C TOTAL SCORE: 0
SKIP TO QUESTIONS 9-10: 1
HOW MANY STANDARD DRINKS CONTAINING ALCOHOL DO YOU HAVE ON A TYPICAL DAY: PATIENT DOES NOT DRINK
HOW OFTEN DO YOU HAVE A DRINK CONTAINING ALCOHOL: NEVER

## 2024-04-18 ASSESSMENT — ENCOUNTER SYMPTOMS
DEPRESSION: 0
OCCASIONAL FEELINGS OF UNSTEADINESS: 1
LOSS OF SENSATION IN FEET: 0

## 2024-04-18 NOTE — PROGRESS NOTES
Subjective   Reason for Visit: Gladys Salas is an 89 y.o. female here for a Medicare Wellness visit.     Past Medical, Surgical, and Family History reviewed and updated in chart.    Reviewed all medications by prescribing practitioner or clinical pharmacist (such as prescriptions, OTCs, herbal therapies and supplements) and documented in the medical record.    [facility]    Follow up and Medicare wellness exam:     Patient complains of ongoing dizziness, lightheadedness, and unsteadiness on her feet. She has stopped the HCTZ. 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 No recent falls or head traumas. It occurred 2-3 years ago.      pSVT: No recent complaints. She is seen by Cardiology, Dr. Aguirre.     HTN: She is currently on Carvedilol 25 mg, Amlodipine 5 mg, She has stopped the hydrochlorothiazide.  She continues to follow-up with Cardiology, Dr. Aguirre. She follows up in 6/2024     Renal insufficiency: She is followed by Nephrology, Dr. Brewster. Patient no longer takes hydrochlorothiazide, she has not taken furosemide recently     Hypothyroidism: She is currently on Levothyroxine 50 mcg. She is aware of taking the medication on an empty stomach prior to breakfast.      HLD: Lipid panel performed  ,   Osteoporosis: She was previously on Prolia and Raloxifene 60 mg daily. No recent DEXA scans to review.     Anxiety: She was previously on Paroxetine     Vitamin B-12/D deficiency: She is currently takes D3 10,000 international units capsules taken every other day. She no longer takes B12 she states     Hyperglycemia: patient does now take Jardiance    Patient Care Team:  Sulaiman Santoyo MD as PCP - General (Internal Medicine)     Review of Systems    otherwise negative aside from what was mentioned above in HPI.     Objective   Vitals:  BP (!) 171/98 (BP Location: Left arm, Patient Position: Sitting, BP Cuff Size: Adult)   Pulse 95   Temp 36.4 °C (97.5 °F)   Resp  16   Ht 1.524 m (5')   Wt 57.8 kg (127 lb 8 oz)   SpO2 97%   BMI 24.90 kg/m²       Physical Exam    Constitutional:       Appearance: Normal appearance. She is normal weight. She is not ill-appearing. Accompanied by daughter  FLASH:      Head: Normocephalic and atraumatic. No neck masses or thyromegaly     Right Ear: External ear normal.      Left Ear: External ear normal.      Eyes:      Extraocular Movements: Extraocular movements intact.      Conjunctiva/sclera: Conjunctivae normal.   Cardiovascular:      Rate and Rhythm: regular rate present. Rhythm irregular.      Pulses: Normal pulses.      Heart sounds: Normal heart sounds. No carotid bruits noted     Comments: Irregular rate and rhythm today noted, patient feels well this is not new      Significant 2+ pitting edema of the LE s is noted.   Pulmonary:      Effort: Pulmonary effort is normal.      Breath sounds: No wheezes, no rhonchi, diminished breath sounds in the bases bilaterally   Chest:      Chest wall: No tenderness.   Musculoskeletal:         General: Normal range of motion.      Cervical back: Normal range of motion and neck supple. No tenderness.     Significant bilateral 2+ pitting edema noted, distal to mid calf   Lymphadenopathy:      Cervical: No cervical adenopathy.   Skin:     General: Skin is warm and dry.      Findings: No rash.   Neurological:      General: No focal deficit present.      Mental Status: She is alert and oriented to person, place, and time.      Motor: No weakness.      Gait: Gait normal.      Deep Tendon Reflexes: Reflexes normal.   Psychiatric:         Mood and Affect: Mood normal.         Behavior: Behavior normal.         Thought Content: Thought content normal.         Judgment: Judgment normal.     Assessment/Plan   Problem List Items Addressed This Visit       Chronic diastolic heart failure with preserved ejection fraction (Multi)    Relevant Medications    furosemide (Lasix) 40 mg tablet    Other Relevant Orders     Transthoracic Echo Complete    Comprehensive Metabolic Panel    CBC and Auto Differential    Hyperlipidemia    Current Assessment & Plan     No med changes for now, monitor         Relevant Orders    Comprehensive Metabolic Panel    CBC and Auto Differential    Hypothyroidism    Current Assessment & Plan     Patient has elevated TSH and elevated free T4, check a free T3, refer to endocrinology for assessment, patient did have a traumatic brain injury at least 2 years ago.          Relevant Orders    Referral to Endocrinology    T3, free    Comprehensive Metabolic Panel    CBC and Auto Differential    TSH with reflex to Free T4 if abnormal    Transient LOC (loss of consciousness)    Relevant Orders    Comprehensive Metabolic Panel    CBC and Auto Differential    Traumatic cerebral hemorrhage (Multi)    Relevant Orders    Comprehensive Metabolic Panel    CBC and Auto Differential    Comprehensive Metabolic Panel    CBC and Auto Differential    Comprehensive Metabolic Panel    CBC and Auto Differential    Comprehensive Metabolic Panel    CBC and Auto Differential    Hypertension    Current Assessment & Plan     BP is elevated, patient has pitting edema of the LE s , she has stopped all diuretic therapy, start furosemide 40 mg daily, this should reduce edema and should at least reduce the BP also, recheck BP in 2-3 weeks. Monitor edema, follow up with cardiology          Relevant Orders    Comprehensive Metabolic Panel    CBC and Auto Differential    Pulmonary emphysema, unspecified emphysema type (Multi)    Relevant Orders    Comprehensive Metabolic Panel    CBC and Auto Differential    Stage 3 chronic kidney disease, unspecified whether stage 3a or 3b CKD (Multi)    Current Assessment & Plan     Continue to monitor, trial of furosemide for edema issues         Relevant Orders    Comprehensive Metabolic Panel    CBC and Auto Differential    Albumin , Urine Random    Pre-diabetes    Current Assessment & Plan      Patient takes Jardiance now, continue the Jardiance as ordered, monitor labs         Relevant Orders    Hemoglobin A1C    Comprehensive Metabolic Panel    CBC and Auto Differential    Albumin , Urine Random     Other Visit Diagnoses       Routine general medical examination at health care facility    -  Primary    Relevant Orders    Comprehensive Metabolic Panel    CBC and Auto Differential    Elevated serum free T4 level        Relevant Orders    Comprehensive Metabolic Panel    CBC and Auto Differential    TSH with reflex to Free T4 if abnormal          Medicare wellness exam completed today, treat with furosemide, will order an ECHO cardiogram, stop all vitamin d for now  See cardiology, follow up in 3 months

## 2024-04-18 NOTE — ASSESSMENT & PLAN NOTE
Patient has elevated TSH and elevated free T4, check a free T3, refer to endocrinology for assessment, patient did have a traumatic brain injury at least 2 years ago.

## 2024-04-18 NOTE — ASSESSMENT & PLAN NOTE
BP is elevated, patient has pitting edema of the LE s , she has stopped all diuretic therapy, start furosemide 40 mg daily, this should reduce edema and should at least reduce the BP also, recheck BP in 2-3 weeks. Monitor edema, follow up with cardiology

## 2024-05-01 ENCOUNTER — HOSPITAL ENCOUNTER (OUTPATIENT)
Dept: CARDIOLOGY | Facility: HOSPITAL | Age: 89
Discharge: HOME | End: 2024-05-01
Payer: COMMERCIAL

## 2024-05-01 DIAGNOSIS — I50.32 CHRONIC DIASTOLIC HEART FAILURE WITH PRESERVED EJECTION FRACTION (MULTI): ICD-10-CM

## 2024-05-01 LAB
AORTIC VALVE MEAN GRADIENT: 3 MMHG
AORTIC VALVE PEAK VELOCITY: 1.21 M/S
AV PEAK GRADIENT: 5.9 MMHG
AVA (PEAK VEL): 2.25 CM2
AVA (VTI): 2.22 CM2
EJECTION FRACTION APICAL 4 CHAMBER: 67.9
LEFT ATRIUM VOLUME AREA LENGTH INDEX BSA: 26.8 ML/M2
LEFT VENTRICLE INTERNAL DIMENSION DIASTOLE: 3.17 CM (ref 3.5–6)
LEFT VENTRICULAR OUTFLOW TRACT DIAMETER: 2 CM
LV EJECTION FRACTION BIPLANE: 67 %
MITRAL VALVE E/A RATIO: 3.46
MITRAL VALVE E/E' RATIO: 15.18
RIGHT VENTRICLE FREE WALL PEAK S': 14.5 CM/S
RIGHT VENTRICLE PEAK SYSTOLIC PRESSURE: 42.9 MMHG
TRICUSPID ANNULAR PLANE SYSTOLIC EXCURSION: 2 CM

## 2024-05-01 PROCEDURE — 93306 TTE W/DOPPLER COMPLETE: CPT | Performed by: INTERNAL MEDICINE

## 2024-05-01 PROCEDURE — 93306 TTE W/DOPPLER COMPLETE: CPT

## 2024-05-13 ENCOUNTER — TELEPHONE (OUTPATIENT)
Dept: PRIMARY CARE | Facility: CLINIC | Age: 89
End: 2024-05-13
Payer: COMMERCIAL

## 2024-05-13 DIAGNOSIS — I50.32 CHRONIC DIASTOLIC HEART FAILURE WITH PRESERVED EJECTION FRACTION (MULTI): ICD-10-CM

## 2024-05-13 DIAGNOSIS — I10 ESSENTIAL HYPERTENSION, BENIGN: ICD-10-CM

## 2024-05-13 RX ORDER — FUROSEMIDE 40 MG/1
40 TABLET ORAL DAILY
Qty: 90 TABLET | Refills: 3 | Status: SHIPPED | OUTPATIENT
Start: 2024-05-13 | End: 2025-05-13

## 2024-05-13 NOTE — TELEPHONE ENCOUNTER
Pt is wanting a 90 day fill of furosemide sent to [facility] instead of picking up a 30 day refill through the local pharmacy.

## 2024-05-26 RX ORDER — AMLODIPINE BESYLATE 5 MG/1
5 TABLET ORAL DAILY
Qty: 90 TABLET | Refills: 3 | Status: SHIPPED | OUTPATIENT
Start: 2024-05-26

## 2024-06-20 ENCOUNTER — INFUSION (OUTPATIENT)
Dept: INFUSION THERAPY | Facility: HOSPITAL | Age: 89
End: 2024-06-20
Payer: COMMERCIAL

## 2024-06-20 VITALS
DIASTOLIC BLOOD PRESSURE: 84 MMHG | HEART RATE: 93 BPM | SYSTOLIC BLOOD PRESSURE: 130 MMHG | TEMPERATURE: 97 F | OXYGEN SATURATION: 95 % | RESPIRATION RATE: 18 BRPM

## 2024-06-20 DIAGNOSIS — M81.0 AGE-RELATED OSTEOPOROSIS WITHOUT CURRENT PATHOLOGICAL FRACTURE: ICD-10-CM

## 2024-06-20 PROCEDURE — 2500000004 HC RX 250 GENERAL PHARMACY W/ HCPCS (ALT 636 FOR OP/ED): Mod: JZ | Performed by: INTERNAL MEDICINE

## 2024-06-20 PROCEDURE — 96372 THER/PROPH/DIAG INJ SC/IM: CPT

## 2024-06-20 PROCEDURE — 96372 THER/PROPH/DIAG INJ SC/IM: CPT | Performed by: INTERNAL MEDICINE

## 2024-06-20 RX ORDER — DIPHENHYDRAMINE HYDROCHLORIDE 50 MG/ML
50 INJECTION INTRAMUSCULAR; INTRAVENOUS AS NEEDED
OUTPATIENT
Start: 2024-12-12

## 2024-06-20 RX ORDER — EPINEPHRINE 0.3 MG/.3ML
0.3 INJECTION SUBCUTANEOUS EVERY 5 MIN PRN
OUTPATIENT
Start: 2024-12-12

## 2024-06-20 RX ORDER — FAMOTIDINE 10 MG/ML
20 INJECTION INTRAVENOUS ONCE AS NEEDED
OUTPATIENT
Start: 2024-12-12

## 2024-06-20 RX ORDER — ALBUTEROL SULFATE 0.83 MG/ML
3 SOLUTION RESPIRATORY (INHALATION) AS NEEDED
OUTPATIENT
Start: 2024-12-12

## 2024-06-20 ASSESSMENT — COLUMBIA-SUICIDE SEVERITY RATING SCALE - C-SSRS
6. HAVE YOU EVER DONE ANYTHING, STARTED TO DO ANYTHING, OR PREPARED TO DO ANYTHING TO END YOUR LIFE?: NO
1. IN THE PAST MONTH, HAVE YOU WISHED YOU WERE DEAD OR WISHED YOU COULD GO TO SLEEP AND NOT WAKE UP?: NO
2. HAVE YOU ACTUALLY HAD ANY THOUGHTS OF KILLING YOURSELF?: NO

## 2024-06-20 ASSESSMENT — ENCOUNTER SYMPTOMS
DEPRESSION: 0
LOSS OF SENSATION IN FEET: 0
OCCASIONAL FEELINGS OF UNSTEADINESS: 1

## 2024-08-06 DIAGNOSIS — I10 ESSENTIAL HYPERTENSION, BENIGN: ICD-10-CM

## 2024-08-06 DIAGNOSIS — I47.10 PAROXYSMAL SVT (SUPRAVENTRICULAR TACHYCARDIA) (CMS-HCC): ICD-10-CM

## 2024-08-06 RX ORDER — CARVEDILOL 25 MG/1
25 TABLET ORAL 2 TIMES DAILY
Qty: 180 TABLET | Refills: 3 | Status: SHIPPED | OUTPATIENT
Start: 2024-08-06

## 2024-08-07 ENCOUNTER — APPOINTMENT (OUTPATIENT)
Dept: PRIMARY CARE | Facility: CLINIC | Age: 89
End: 2024-08-07
Payer: COMMERCIAL

## 2024-08-09 PROBLEM — I50.30 (HFPEF) HEART FAILURE WITH PRESERVED EJECTION FRACTION (MULTI): Status: ACTIVE | Noted: 2024-08-09

## 2024-08-09 PROBLEM — I36.1 NONRHEUMATIC TRICUSPID VALVE REGURGITATION: Status: ACTIVE | Noted: 2024-08-09

## 2024-08-09 PROBLEM — I48.19 PERSISTENT ATRIAL FIBRILLATION (MULTI): Status: ACTIVE | Noted: 2023-08-21

## 2024-08-09 PROBLEM — I34.0 MITRAL VALVE REGURGITATION: Status: ACTIVE | Noted: 2024-08-09

## 2024-08-09 NOTE — PROGRESS NOTES
[facility] Heart and Vascular Cardiology    Patient Name: Gladys Salas  Patient : 1929      Scribe Attestation  By signing my name below, IAmanda Scribe   attest that this documentation has been prepared under the direction and in the presence of Jag Aguirre DO.      Reason for visit:  This is a 89-year-old female here for follow-up regarding HFpEF, persistent atrial fibrillation not currently on anticoagulation as patient has declined in the past, PSVT, mitral/tricuspid valve regurgitation, hypertension, and hypothyroidism.    HPI:  This is a 89-year-old female here for follow-up regarding HFpEF, persistent atrial fibrillation not currently on anticoagulation as patient has declined in the past, PSVT, mitral/tricuspid valve regurgitation, hypertension, and hypothyroidism.  The patient was last evaluated by me in 2023.  At that visit I had started her on amlodipine and asked that she follow-up in a month.  Patient was subsequently seen by the cardiology NP on several occasions most recently in 2024 at which time she was doing reasonably well and asked to follow-up again in 6 months.  CMP done 2024 showed normal serum sodium and potassium with a serum creatinine of 1.02, normal ALT/AST, hemoglobin A1c was 6.1%, TSH was 4.26 with a free thyroxine of 1.20.  CBC done in 2023 showed a hemoglobin of 14.3.  Echocardiogram done in May 2024 showed normal left ventricular systolic function with an ejection fraction of 60%, mildly reduced right ventricular systolic function, mild to moderate mitral valve regurgitation, moderate tricuspid valve regurgitation. ECG done today showed atrial fibrillation with a heart rate of 80 bpm.  The patient reports intermittent lightheadedness which she believes is related to Jardiance. She denies any new chest pain, shortness of breath, and palpitations. She states that she takes all of her medications as prescribed. During  my exam, he was resting comfortably on the exam table.            Assessment/Plan:   1. HFpEF  The patient has a history of HFpEF.   Echocardiogram done in May 2024 showed normal left ventricular systolic function with an ejection fraction of 60%, mildly reduced right ventricular systolic function, mild to moderate mitral valve regurgitation, moderate tricuspid valve regurgitation.  She does have 1+ pitting bilateral lower extremity edema on exam today.  She should continue current cardiac medications.  Recent lab works as noted in the HPI.   Lab works as noted below will be done in 6 months prior to his next visit.   I discussed with her the importance of following a low-sodium heart healthy diet, wearing compression stockings and elevating legs when seated.   Follow up in 6 months and sooner if necessary.     2. Persistent atrial fibrillation  The patient has a history of persistent atrial fibrillation not currently on anticoagulation as patient has declined in the past.  ECG done today showed atrial fibrillation with a heart rate of 80 bpm.    She denies chest pain, palpitations or lightheadedness.   Discussed her risks for thromboembolic events such as stroke, as well as the risks/benefits anticoagulation therapy. She expressed understanding and declined anticoagulation at this time.   She should continue carvedilol for heart rate control.  Echocardiogram done in May 2024 showed normal left ventricular systolic function with an ejection fraction of 60%, mildly reduced right ventricular systolic function, mild to moderate mitral valve regurgitation, moderate tricuspid valve regurgitation.  Recent lab works as noted in the HPI.   Lab works as noted below will be done in 6 months prior to his next visit.   Follow up in 6 months and sooner if necessary.      3. PSVT  The patient has a history of PSVT.  ECG done today showed atrial fibrillation with a heart rate of 80 bpm.    She denies chest pain, palpitations or  shortness of breath.   She should continue current dose of carvedilol for heart rate control.   Echocardiogram done in May 2024 showed normal left ventricular systolic function with an ejection fraction of 60%, mildly reduced right ventricular systolic function, mild to moderate mitral valve regurgitation, moderate tricuspid valve regurgitation.  Recent lab works as noted in the HPI.   Lab works as noted below will be done in 6 months prior to his next visit.   Patient should follow general recommendations including avoiding excessive alcohol intake, avoiding excessive caffeine intake, staying well-hydrated, getting an appropriate amount of sleep.  Follow up in 6 months and sooner if necessary.      4. Mitral/tricuspid valve regurgitation  The patient has a history of mitral/tricuspid valve regurgitation.  Echocardiogram done in May 2024 showed normal left ventricular systolic function with an ejection fraction of 60%, mildly reduced right ventricular systolic function, mild to moderate mitral valve regurgitation, moderate tricuspid valve regurgitation.  I will continue to monitor this clinically.    5. Hypertension  The patient has a history of hypertension and blood pressure appears controlled on exam today.   She should continue her current antihypertensive medications and monitor her blood pressure at home.   Continue risk factor modification.     6. Hypothyroidism  TSH done in April 2024 was 4.26 with a free thyroxine of 1.20.    Management as per PCP.    7. Lightheadedness  The patient reports lightheadedness which she believes could be related to Jardiance.  ECG done today showed atrial fibrillation with a heart rate of 80 bpm.   Blood pressure appears controlled on exam today.  Echocardiogram done in May 2024 showed normal left ventricular systolic function with an ejection fraction of 60%, mildly reduced right ventricular systolic function, mild to moderate mitral valve regurgitation, moderate tricuspid  valve regurgitation.  Recent lab works as noted in the HPI.   Lab works as noted below will be done in 6 months prior to his next visit.   Patient should follow general recommendations including staying well-hydrated, changing the positions slowly, wearing compression stockings as necessary, and routine exercise.       Orders:   BMP/BNP/CBC/magnesium in 6 months,   Follow-up in 6 months.    Lifestyle Recommendations  I recommend a whole-food plant-based diet, an eating pattern that encourages the consumption of unrefined plant foods (such as fruits, vegetables, tubers, whole grains, legumes, nuts and seeds) and discourages meats, dairy products, eggs and processed foods.     The AHA/ACC recommends that the patient consume a dietary pattern that emphasizes intake of vegetables, fruits, and whole grains; includes low-fat dairy products, poultry, fish, legumes, non-tropical vegetable oils, and nuts; and limits intake of sodium, sweets, sugar-sweetened beverages, and red meats.  Adapt this dietary pattern to appropriate calorie requirements (a 500-750 kcal/day deficit to loose weight), personal and cultural food preferences, and nutrition therapy for other medical conditions (including diabetes).  Achieve this pattern by following plans such as the [place] Mediterranean, DASH dietary pattern, or AHA diet.     Engage in 2 hours and 30 minutes per week of moderate-intensity physical activity, or 1 hour and 15 minutes (75 minutes) per week of vigorous-intensity aerobic physical activity, or an equivalent combination of moderate and vigorous-intensity aerobic physical activity. Aerobic activity should be performed in episodes of at least 10 minutes preferably spread throughout the week.     Adhering to a heart healthy diet, regular exercise habits, avoidance of tobacco products, and maintenance of a healthy weight are crucial components of their heart disease risk reduction.     Any positive review of systems not specifically  addressed in the office visit today should be evaluated and treated by the patients primary care physician or in an emergency department if necessary     Patient was notified that results from ordered tests will be called to the patient if it changes current management; it will otherwise be discussed at a future appointment and available on [facility].     Thank you for allowing me to participate in the care of this patient.        This document was generated using the assistance of voice recognition software. If there are any errors of spelling, grammar, syntax, or meaning; please feel free to contact me directly for clarification.    Past Medical History:  She has a past medical history of Contact with and (suspected) exposure to covid-19 (11/26/2022), Fall in home (11/26/2022), and Impacted cerumen (04/18/2024).    Past Surgical History:  She has a past surgical history that includes Other surgical history (12/10/2019) and Other surgical history (12/10/2019).      Social History:  She reports that she has never smoked. She has never been exposed to tobacco smoke. She has never used smokeless tobacco. She reports that she does not drink alcohol and does not use drugs.    Family History:  No family history on file.     Allergies:  Lisinopril    Outpatient Medications:  Current Outpatient Medications   Medication Instructions    amLODIPine (NORVASC) 5 mg, oral, Daily    carvedilol (COREG) 25 mg, oral, 2 times daily    furosemide (LASIX) 40 mg, oral, Daily    Jardiance 10 mg, oral, Daily    levothyroxine (SYNTHROID, LEVOXYL) 50 mcg, oral, Daily before breakfast    PARoxetine (PAXIL) 20 mg, oral, Daily    Prolia 60 mg, subcutaneous, Once        ROS:  A 14 point review of systems was done and is negative other than as stated in HPI    Vitals:      10/4/2023     2:22 PM 12/20/2023     1:07 PM 12/20/2023     1:32 PM 2/8/2024     1:44 PM 2/8/2024     2:08 PM 4/18/2024     1:42 PM 6/20/2024     1:17 PM   Vitals   Systolic  142 165 156 164 138 171 130   Diastolic 72 108 96 74 70 98 84   Heart Rate 88 67 72 84  95 93   Temp 36.1 °C (96.9 °F) 37.1 °C (98.8 °F)    36.4 °C (97.5 °F) 36.1 °C (97 °F)   Resp 16 18 18   16 18   Height (in) 1.524 m (5')     1.524 m (5')    Weight (lb) 124   130  127.5    BMI 24.22 kg/m2   25.39 kg/m2  24.9 kg/m2    BSA (m2) 1.54 m2   1.58 m2  1.56 m2         Physical Exam:   Constitutional: Cooperative, in no acute distress, alert, appears stated age.   Skin: Skin color, texture, turgor normal. No rashes or lesions.   Head: Normocephalic. No masses, lesions, tenderness or abnormalities   Eyes: Extraocular movements are grossly intact.   Mouth and throat: Mucous membranes moist   Neck: Neck supple, no carotid bruits, no JVD   Respiratory: Lungs clear to auscultation, no wheezing or rhonchi, no use of accessory muscles   Chest wall: No scars, normal excursion with respiration   Cardiovascular: Irregular rhythm without murmur  Gastrointestinal: Abdomen soft, nontender. Bowel sounds normal.   Musculoskeletal: Strength equal in upper extremities   Extremities: 1+ pitting edema   Neurologic: Sensation grossly intact, alert and oriented x3    Intake/Output:   No intake/output data recorded.    Outpatient Medications  Current Outpatient Medications on File Prior to Visit   Medication Sig Dispense Refill    amLODIPine (Norvasc) 5 mg tablet TAKE 1 TABLET DAILY 90 tablet 3    carvedilol (Coreg) 25 mg tablet TAKE 1 TABLET TWICE A  tablet 3    denosumab (Prolia) 60 mg/mL syringe Inject 1 mL (60 mg total) under the skin 1 time for 1 dose. 1 mL 1    furosemide (Lasix) 40 mg tablet Take 1 tablet (40 mg) by mouth once daily. 90 tablet 3    Jardiance 10 mg TAKE 1 TABLET DAILY 90 tablet 3    levothyroxine (Synthroid, Levoxyl) 50 mcg tablet TAKE 1 TABLET DAILY IN THE MORNING BEFORE A MEAL 90 tablet 3    PARoxetine (Paxil) 20 mg tablet TAKE 1 TABLET DAILY 90 tablet 3    [DISCONTINUED] carvedilol (Coreg) 25 mg tablet Take 1  tablet (25 mg) by mouth 2 times a day. 180 tablet 3     No current facility-administered medications on file prior to visit.       Labs: (past 26 weeks)  Recent Results (from the past 4368 hour(s))   Hemoglobin A1C    Collection Time: 04/11/24  8:09 AM   Result Value Ref Range    Hemoglobin A1C 6.1 (H) see below %    Estimated Average Glucose 128 Not Established mg/dL   TSH with reflex to Free T4 if abnormal    Collection Time: 04/11/24  8:09 AM   Result Value Ref Range    Thyroid Stimulating Hormone 4.26 (H) 0.44 - 3.98 mIU/L   Comprehensive Metabolic Panel    Collection Time: 04/11/24  8:09 AM   Result Value Ref Range    Glucose 131 (H) 74 - 99 mg/dL    Sodium 141 136 - 145 mmol/L    Potassium 4.4 3.5 - 5.3 mmol/L    Chloride 104 98 - 107 mmol/L    Bicarbonate 28 21 - 32 mmol/L    Anion Gap 13 10 - 20 mmol/L    Urea Nitrogen 24 (H) 6 - 23 mg/dL    Creatinine 1.02 0.50 - 1.05 mg/dL    eGFR 51 (L) >60 mL/min/1.73m*2    Calcium 9.0 8.6 - 10.3 mg/dL    Albumin 4.3 3.4 - 5.0 g/dL    Alkaline Phosphatase 41 33 - 136 U/L    Total Protein 6.9 6.4 - 8.2 g/dL    AST 14 9 - 39 U/L    Bilirubin, Total 0.9 0.0 - 1.2 mg/dL    ALT 9 7 - 45 U/L   Vitamin D 25-Hydroxy,Total (for eval of Vitamin D levels)    Collection Time: 04/11/24  8:09 AM   Result Value Ref Range    Vitamin D, 25-Hydroxy, Total >120 (H) 30 - 100 ng/mL   Thyroxine, Free    Collection Time: 04/11/24  8:09 AM   Result Value Ref Range    Thyroxine, Free 1.20 (H) 0.61 - 1.12 ng/dL   Albumin , Urine Random    Collection Time: 04/12/24  2:04 PM   Result Value Ref Range    Albumin, Urine Random 106.6 Not established mg/L    Creatinine, Urine Random 71.0 20.0 - 320.0 mg/dL    Albumin/Creatinine Ratio 150.1 (H) <30.0 ug/mg Creat   Transthoracic Echo Complete    Collection Time: 05/01/24  1:42 PM   Result Value Ref Range    LVOT diam 2.00 cm    LV Biplane EF 67 %    MV E/A ratio 3.46     MV avg E/e' ratio 15.18     Tricuspid annular plane systolic excursion 2.0 cm    AV  mn grad 3.0 mmHg    LA vol index A/L 26.8 ml/m2    AV pk esequiel 1.21 m/s    RV free wall pk S' 14.50 cm/s    RVSP 42.9 mmHg    LVIDd 3.17 cm    Aortic Valve Area by Continuity of VTI 2.22 cm2    Aortic Valve Area by Continuity of Peak Velocity 2.25 cm2    AV pk grad 5.9 mmHg    LV A4C EF 67.9        ECG  No results found for this or any previous visit (from the past 4464 hour(s)).    Echocardiogram  No results found for this or any previous visit from the past 1095 days.      CV Studies:  EKG:No results found for this or any previous visit (from the past 4464 hour(s)).  Echocardiogram:   Echocardiogram     [place] [id number]  Phone [phone] Fax [phone]    TRANSTHORACIC ECHOCARDIOGRAM REPORT      Patient Name:     DEREK SOULEYMANE COLE    Reading Physician:   [id number] Jag Aguirre DO  Study Date:       11/25/2022          Referring Physician: THOR OROZCO  MRN/PID:          [id number]            PCP:  Accession/Order#: [id number]           Department Location: [facility]  YOB: 1929           Fellow:  Gender:           F                   Nurse:  Admit Date:       11/23/2022          Sonographer:         Morenita Yan RDCS  Admission Status: Inpatient - Routine Additional Staff:  Height:           152.40 cm           CC Report to:  Weight:           55.34 kg            Study Type:          Echocardiogram  BSA:              1.51 m2  Blood Pressure: 109 /66 mmHg    Diagnosis/ICD: R55-Syncope  Indication:    Syncope  Procedure/CPT: Echo Complete w Full [id number]    Patient History:  Pertinent History: HTN, HLD.    Study Detail: The following Echo studies were performed: 2D, M-Mode, Doppler and  color flow.      PHYSICIAN INTERPRETATION:  Left Ventricle: Left ventricular systolic function is normal, with an estimated ejection fraction of 65-70%. There are no regional wall motion abnormalities. The left ventricular cavity size is normal.  Spectral Doppler shows an impaired relaxation pattern of left ventricular diastolic filling.  Left Atrium: The left atrium is normal in size.  Right Ventricle: The right ventricle is normal in size. There is normal right ventricular global systolic function.  Right Atrium: The right atrium is normal in size.  Aortic Valve: The aortic valve is trileaflet. There is no evidence of aortic valve regurgitation. The peak instantaneous gradient of the aortic valve is 11.1 mmHg. The mean gradient of the aortic valve is 5.4 mmHg.  Mitral Valve: The mitral valve is mildly thickened. There is trace mitral valve regurgitation.  Tricuspid Valve: The tricuspid valve is structurally normal. There is trace to mild tricuspid regurgitation.  Pulmonic Valve: The pulmonic valve is structurally normal. There is no indication of pulmonic valve regurgitation.  Pericardium: There is no pericardial effusion noted. There is a pericardial fat pad present.  Aorta: The aortic root is normal.      CONCLUSIONS:  1. Left ventricular systolic function is normal with a 65-70% estimated ejection fraction.  2. Spectral Doppler shows an impaired relaxation pattern of left ventricular diastolic filling.    QUANTITATIVE DATA SUMMARY:  2D MEASUREMENTS:  Normal Ranges:  LAs:           2.78 cm   (2.7-4.0cm)  IVSd:          0.87 cm   (0.6-1.1cm)  LVPWd:         0.87 cm   (0.6-1.1cm)  LVIDd:         4.07 cm   (3.9-5.9cm)  LVIDs:         2.07 cm  LV Mass Index: 70.8 g/m2  LV % FS        49.2 %    LA VOLUME:  Normal Ranges:  LA Vol A4C:        36.0 ml    (22+/-6mL/m2)  LA Vol A2C:        21.6 ml  LA Vol BP:         28.5 ml  LA Vol Index A4C:  23.8 ml/m2  LA Vol Index A2C:  14.3 ml/m2  LA Vol Index BP:   18.8 ml/m2  LA Area A4C:       14.1 cm2  LA Area A2C:       10.7 cm2  LA Major Axis A4C: 4.7 cm  LA Major Axis A2C: 4.5 cm  LA Volume Index:   18.0 ml/m2  LA Vol A4C:        35.2 ml  LA Vol A2C:        21.3 ml    RA VOLUME BY A/L METHOD:  Normal Ranges:  RA Area  A4C: 10.2 cm2    AORTA MEASUREMENTS:  Normal Ranges:  Ao Sinus, d: 2.40 cm (2.1-3.5cm)  Ao STJ, d:   2.50 cm (1.7-3.4cm)  Asc Ao, d:   3.20 cm (2.1-3.4cm)    LV SYSTOLIC FUNCTION BY 2D PLANIMETRY (MOD):  Normal Ranges:  EF-A4C View: 79.2 % (>=55%)  EF-A2C View: 69.7 %  EF-Biplane:  77.4 %    LV DIASTOLIC FUNCTION:  Normal Ranges:  MV Peak E:        0.82 m/s    (0.7-1.2 m/s)  MV Peak A:        1.18 m/s    (0.42-0.7 m/s)  E/A Ratio:        0.69        (1.0-2.2)  MV e'             0.07 m/s    (>8.0)  MV lateral e'     0.07 m/s  MV medial e'      0.07 m/s  MV A Dur:         122.74 msec  E/e' Ratio:       11.66       (<8.0)  PulmV A Revs Dur: 137.01 msec    MITRAL VALVE:  Normal Ranges:  MV DT: 244 msec (150-240msec)    AORTIC VALVE:  Normal Ranges:  AoV Vmax:                1.67 m/s  (<=1.7m/s)  AoV Peak P.1 mmHg (<20mmHg)  AoV Mean P.4 mmHg  (1.7-11.5mmHg)  LVOT Max Bam:            1.19 m/s  (<=1.1m/s)  AoV VTI:                 32.50 cm  (18-25cm)  LVOT VTI:                25.37 cm  LVOT Diameter:           1.75 cm   (1.8-2.4cm)  AoV Area, VTI:           1.88 cm2  (2.5-5.5cm2)  AoV Area,Vmax:           1.73 cm2  (2.5-4.5cm2)  AoV Dimensionless Index: 0.78    RIGHT VENTRICLE:  RV 1   3.2 cm  RV 2   2.3 cm  RV 3   5.1 cm  TAPSE: 21.1 mm  RV s'  0.15 m/s    TRICUSPID VALVE/RVSP:  Normal Ranges:  Peak TR Velocity: 3.03 m/s  RV Syst Pressure: 39.8 mmHg (< 30mmHg)  TV E Vmax:        0.36 m/s  (0.3-0.7m/s)  TV A Vmax:        0.63 m/s  IVC Diam:         0.80 cm    Pulmonary Veins:  PulmV A Revs Dur: 137.01 msec      [id number] Jag Aguirre DO  Electronically signed on 2022 at 3:56:52 PM         Final     Stress Testing IMGRESULT([id number]):   NM CARDIAC STRESS REST (MYOCARDIAL PERFUSION MIBI) 2021    Narrative  MRN: [id number]  Patient Name: DEREK COLE    STUDY:  CARDIAC STRESS/REST INJECTION;  2021 9:51 am    INDICATION:  Chest pain/Abnormal  DSE.    COMPARISON:  None.    ACCESSION NUMBER(S):  [id number]    ORDERING CLINICIAN:  JAG AGUIRRE    TECHNIQUE:  DIVISION OF NUCLEAR MEDICINE  PHARMACOLOGIC STRESS MYOCARDIAL PERFUSION SCAN, ONE DAY PROTOCOL    The patient received an intravenous dose of  10.6 mCi of Tc-99m  tetrofosmin and resting emission tomographic (SPECT) images of the  myocardium were acquired. The patient then received an intravenous  infusion of 0.4mg regadenoson (Lexiscan) followed by an additional  dose of  35.9 mCi of Tc-99m tetrofosmin. Stress phase SPECT images of  the myocardium were then acquired. These included ECG-gated images to  assess and quantify ventricular function.    FINDINGS:  Stress and rest images both demonstrate a normal distribution of  perfusion throughout all LV segments with no sign of ischemia.    ECG-gated images demonstrate normal LV size and myocardial  contractility with an LV ejection fraction of   84 % (normal above 50  percent).    Impression  1. Normal stress myocardial perfusion imaging in response to  pharmacologic stress.  2. Well-maintained left ventricular function.    Cardiac Catheterization: No results found for this or any previous visit from the past 1825 days.  No results found for this or any previous visit from the past 3650 days.     Cardiac Scoring: No results found for this or any previous visit from the past 1825 days.    AAA : No results found for this or any previous visit from the past 1825 days.    OTHER: No results found for this or any previous visit from the past 1825 days.    LAST IMAGING RESULTS  Transthoracic Echo Complete                [place] [id number]       Phone [phone] Fax [phone]    TRANSTHORACIC ECHOCARDIOGRAM REPORT       Patient Name:      DEREK Purdy Physician:    [id number] Jag Aguirre DO  Study Date:        5/1/2024             Ordering  Provider:    [id number] FABIÁN VILLAGOMEZ  MRN/PID:           [id number]             Fellow:  Accession#:        [id number]         Nurse:  Date of Birth/Age: 4/12/1929 / 89 years Sonographer:          Kezia Moore RDCS  Gender:            F                    Additional Staff:  Height:            152.40 cm            Admit Date:  Weight:            57.61 kg             Admission Status:     Outpatient  BSA / BMI:         1.54 m2 / 24.80      Department Location:  [facility] Echo                     kg/m2                                      Lab  Blood Pressure: 171 /98 mmHg    Study Type:    TRANSTHORACIC ECHO (TTE) COMPLETE  Diagnosis/ICD: Chronic diastolic (congestive) heart failure (CHF)-I50.32  Indication:    Congestive Heart Failure  CPT Codes:     Echo Complete w Full Doppler-[id number]   Study Detail: The following Echo studies were performed: 2D, M-Mode, Doppler and                color flow.       PHYSICIAN INTERPRETATION:  Left Ventricle: Left ventricular systolic function is normal, with an estimated ejection fraction of 60%. There are no regional wall motion abnormalities. The left ventricular cavity size is normal. Spectral Doppler shows an abnormal pattern of left ventricular diastolic filling.  Left Atrium: The left atrium is normal in size.  Right Ventricle: The right ventricle is normal in size. There is mildly reduced right ventricular systolic function.  Right Atrium: The right atrium is normal in size.  Aortic Valve: The aortic valve was not well visualized. There is no evidence of aortic valve regurgitation. The peak instantaneous gradient of the aortic valve is 5.9 mmHg. The mean gradient of the aortic valve is 3.0 mmHg.  Mitral Valve: The mitral valve is mild to moderately thickened. There is evidence of mild mitral valve stenosis. The doppler estimated mean and peak  diastolic pressure gradients are 3.5 mmHg and 11.9 mmHg respectively. There is mild mitral annular calcification. There is mild to moderate mitral valve regurgitation.  Tricuspid Valve: The tricuspid valve is structurally normal. There is mild thickening of the tricuspid valve leaflets. There is moderate tricuspid regurgitation. The Doppler estimated RVSP is mildly elevated at 42.9 mmHg.  Pulmonic Valve: The pulmonic valve is not well visualized. There is no indication of pulmonic valve regurgitation.  Pericardium: There is no pericardial effusion noted.  Aorta: The aortic root is normal.       CONCLUSIONS:   1. Left ventricular systolic function is normal with a 60% estimated ejection fraction.   2. Spectral Doppler shows an abnormal pattern of left ventricular diastolic filling.   3. There is mildly reduced right ventricular systolic function.   4. Mild to moderate mitral valve regurgitation.   5. Mildly elevated RVSP.   6. Moderate tricuspid regurgitation.    QUANTITATIVE DATA SUMMARY:  2D MEASUREMENTS:                           Normal Ranges:  Ao Root d:     2.70 cm   (2.0-3.7cm)  LAs:           3.60 cm   (2.7-4.0cm)  IVSd:          0.83 cm   (0.6-1.1cm)  LVPWd:         0.87 cm   (0.6-1.1cm)  LVIDd:         3.17 cm   (3.9-5.9cm)  LVIDs:         2.04 cm  LV Mass Index: 45.5 g/m2  LV % FS        35.6 %    LA VOLUME:                                Normal Ranges:  LA Vol A4C:        38.0 ml    (22+/-6mL/m2)  LA Vol A2C:        36.8 ml  LA Vol BP:         41.2 ml  LA Vol Index A4C:  24.7ml/m2  LA Vol Index A2C:  23.9 ml/m2  LA Vol Index BP:   26.8 ml/m2  LA Area A4C:       14.5 cm2  LA Area A2C:       15.7 cm2  LA Major Axis A4C: 4.7 cm  LA Major Axis A2C: 5.7 cm  LA Volume Index:   26.8 ml/m2    RA VOLUME BY A/L METHOD:                        Normal Ranges:  RA Area A4C: 10.7 cm2    AORTA MEASUREMENTS:                       Normal Ranges:  Ao Sinus, d: 2.70 cm (2.1-3.5cm)  Ao STJ, d:   2.10 cm (1.7-3.4cm)  Asc Ao,  d:   2.70 cm (2.1-3.4cm)    LV SYSTOLIC FUNCTION BY 2D PLANIMETRY (MOD):                      Normal Ranges:  EF-A4C View: 67.9 % (>=55%)  EF-A2C View: 63.1 %  EF-Biplane:  66.5 %    LV DIASTOLIC FUNCTION:                         Normal Ranges:  MV Peak E:    1.29 m/s (0.7-1.2 m/s)  MV Peak A:    0.37 m/s (0.42-0.7 m/s)  E/A Ratio:    3.46     (1.0-2.2)  MV e'         0.08 m/s (>8.0)  MV lateral e' 0.09 m/s  MV medial e'  0.08 m/s  E/e' Ratio:   15.18    (<8.0)    MITRAL VALVE:                        Normal Ranges:  MV Vmax:    1.72 m/s  (<=1.3m/s)  MV peak P.9 mmHg (<5mmHg)  MV mean PG: 3.5 mmHg  (<48mmHg)  MV DT:      188 msec  (150-240msec)    MITRAL INSUFFICIENCY:                            Normal Ranges:  PISA Radius:  0.3 cm  MR VTI:       127.00 cm  MR Vmax:      456.00 cm/s  MR Alias Bam: 42.1 cm/s  MR Volume:    6.63 ml  MR Flow Rt:   23.81 ml/s  MR EROA:      0.05 cm2    AORTIC VALVE:                                    Normal Ranges:  AoV Vmax:                1.21 m/s (<=1.7m/s)  AoV Peak P.9 mmHg (<20mmHg)  AoV Mean PG:             3.0 mmHg (1.7-11.5mmHg)  LVOT Max Bam:            0.87 m/s (<=1.1m/s)  AoV VTI:                 25.50 cm (18-25cm)  LVOT VTI:                18.00 cm  LVOT Diameter:           2.00 cm  (1.8-2.4cm)  AoV Area, VTI:           2.22 cm2 (2.5-5.5cm2)  AoV Area,Vmax:           2.25 cm2 (2.5-4.5cm2)  AoV Dimensionless Index: 0.71       RIGHT VENTRICLE:  RV Basal 2.97 cm  RV Mid   2.03 cm  RV Major 4.4 cm  TAPSE:   20.4 mm  RV s'    0.14 m/s    TRICUSPID VALVE/RVSP:                              Normal Ranges:  Peak TR Velocity: 3.16 m/s  Est. RA Pressure: 3 mmHg  RV Syst Pressure: 42.9 mmHg (< 30mmHg)  IVC Diam:         1.27 cm       [id number] Jag Aguirre DO  Electronically signed on 2024 at 2:04:10 PM       ** Final **      Problem List Items Addressed This Visit       Hypothyroidism    Paroxysmal SVT (supraventricular tachycardia) (CMS-HCC)    Hypertension     Persistent atrial fibrillation (Multi)    (HFpEF) heart failure with preserved ejection fraction (Multi) - Primary    Mitral valve regurgitation    Nonrheumatic tricuspid valve regurgitation          Jag Aguirre DO, FACC, FACOI

## 2024-08-14 ENCOUNTER — APPOINTMENT (OUTPATIENT)
Dept: CARDIOLOGY | Facility: CLINIC | Age: 89
End: 2024-08-14
Payer: COMMERCIAL

## 2024-08-14 VITALS
BODY MASS INDEX: 24.58 KG/M2 | HEART RATE: 80 BPM | DIASTOLIC BLOOD PRESSURE: 62 MMHG | HEIGHT: 60 IN | WEIGHT: 125.2 LBS | SYSTOLIC BLOOD PRESSURE: 118 MMHG

## 2024-08-14 DIAGNOSIS — I34.0 NONRHEUMATIC MITRAL VALVE REGURGITATION: ICD-10-CM

## 2024-08-14 DIAGNOSIS — E03.9 HYPOTHYROIDISM, UNSPECIFIED TYPE: ICD-10-CM

## 2024-08-14 DIAGNOSIS — I50.32 CHRONIC HEART FAILURE WITH PRESERVED EJECTION FRACTION (MULTI): Primary | ICD-10-CM

## 2024-08-14 DIAGNOSIS — I48.91 ATRIAL FIBRILLATION, UNSPECIFIED TYPE (MULTI): ICD-10-CM

## 2024-08-14 DIAGNOSIS — I48.19 PERSISTENT ATRIAL FIBRILLATION (MULTI): ICD-10-CM

## 2024-08-14 DIAGNOSIS — I36.1 NONRHEUMATIC TRICUSPID VALVE REGURGITATION: ICD-10-CM

## 2024-08-14 DIAGNOSIS — I10 PRIMARY HYPERTENSION: ICD-10-CM

## 2024-08-14 DIAGNOSIS — I47.10 PAROXYSMAL SVT (SUPRAVENTRICULAR TACHYCARDIA) (CMS-HCC): ICD-10-CM

## 2024-08-14 DIAGNOSIS — I50.32 CHRONIC DIASTOLIC HEART FAILURE WITH PRESERVED EJECTION FRACTION (MULTI): ICD-10-CM

## 2024-08-14 PROCEDURE — 1036F TOBACCO NON-USER: CPT | Performed by: INTERNAL MEDICINE

## 2024-08-14 PROCEDURE — 99214 OFFICE O/P EST MOD 30 MIN: CPT | Performed by: INTERNAL MEDICINE

## 2024-08-14 PROCEDURE — 93005 ELECTROCARDIOGRAM TRACING: CPT | Performed by: INTERNAL MEDICINE

## 2024-08-14 PROCEDURE — 1159F MED LIST DOCD IN RCRD: CPT | Performed by: INTERNAL MEDICINE

## 2024-08-14 PROCEDURE — 3074F SYST BP LT 130 MM HG: CPT | Performed by: INTERNAL MEDICINE

## 2024-08-14 PROCEDURE — 3078F DIAST BP <80 MM HG: CPT | Performed by: INTERNAL MEDICINE

## 2024-08-14 PROCEDURE — 93010 ELECTROCARDIOGRAM REPORT: CPT | Performed by: INTERNAL MEDICINE

## 2024-08-15 ENCOUNTER — APPOINTMENT (OUTPATIENT)
Dept: PRIMARY CARE | Facility: CLINIC | Age: 89
End: 2024-08-15
Payer: COMMERCIAL

## 2024-09-19 ENCOUNTER — LAB (OUTPATIENT)
Dept: LAB | Facility: LAB | Age: 89
End: 2024-09-19
Payer: COMMERCIAL

## 2024-09-19 DIAGNOSIS — S06.36AS: ICD-10-CM

## 2024-09-19 DIAGNOSIS — E67.3 HIGH VITAMIN D LEVEL: ICD-10-CM

## 2024-09-19 DIAGNOSIS — E03.9 ACQUIRED HYPOTHYROIDISM: ICD-10-CM

## 2024-09-19 DIAGNOSIS — S06.361D TRAUMATIC HEMORRHAGE OF CEREBRUM WITH LOSS OF CONSCIOUSNESS OF 30 MINUTES OR LESS, UNSPECIFIED LATERALITY, SUBSEQUENT ENCOUNTER: ICD-10-CM

## 2024-09-19 DIAGNOSIS — S06.36AD: ICD-10-CM

## 2024-09-19 DIAGNOSIS — R73.03 PRE-DIABETES: ICD-10-CM

## 2024-09-19 DIAGNOSIS — M81.0 AGE-RELATED OSTEOPOROSIS WITHOUT CURRENT PATHOLOGICAL FRACTURE: ICD-10-CM

## 2024-09-19 DIAGNOSIS — R55 TRANSIENT LOC (LOSS OF CONSCIOUSNESS): ICD-10-CM

## 2024-09-19 DIAGNOSIS — R79.89 ELEVATED SERUM FREE T4 LEVEL: ICD-10-CM

## 2024-09-19 DIAGNOSIS — Z00.00 ROUTINE GENERAL MEDICAL EXAMINATION AT HEALTH CARE FACILITY: ICD-10-CM

## 2024-09-19 DIAGNOSIS — E78.5 HYPERLIPIDEMIA, UNSPECIFIED HYPERLIPIDEMIA TYPE: ICD-10-CM

## 2024-09-19 DIAGNOSIS — N18.30 STAGE 3 CHRONIC KIDNEY DISEASE, UNSPECIFIED WHETHER STAGE 3A OR 3B CKD (MULTI): ICD-10-CM

## 2024-09-19 DIAGNOSIS — R73.9 HYPERGLYCEMIA: ICD-10-CM

## 2024-09-19 DIAGNOSIS — I50.32 CHRONIC DIASTOLIC HEART FAILURE WITH PRESERVED EJECTION FRACTION: ICD-10-CM

## 2024-09-19 DIAGNOSIS — J43.9 PULMONARY EMPHYSEMA, UNSPECIFIED EMPHYSEMA TYPE (MULTI): ICD-10-CM

## 2024-09-19 DIAGNOSIS — I10 PRIMARY HYPERTENSION: ICD-10-CM

## 2024-09-19 DIAGNOSIS — S06.361S: ICD-10-CM

## 2024-09-19 LAB
25(OH)D3 SERPL-MCNC: 79 NG/ML (ref 30–100)
ALBUMIN SERPL BCP-MCNC: 4.3 G/DL (ref 3.4–5)
ALP SERPL-CCNC: 41 U/L (ref 33–136)
ALT SERPL W P-5'-P-CCNC: 8 U/L (ref 7–45)
ANION GAP SERPL CALC-SCNC: 13 MMOL/L (ref 10–20)
AST SERPL W P-5'-P-CCNC: 15 U/L (ref 9–39)
BASOPHILS # BLD AUTO: 0.02 X10*3/UL (ref 0–0.1)
BASOPHILS NFR BLD AUTO: 0.4 %
BILIRUB SERPL-MCNC: 1.1 MG/DL (ref 0–1.2)
BUN SERPL-MCNC: 26 MG/DL (ref 6–23)
CA-I BLD-SCNC: 1.15 MMOL/L (ref 1.1–1.33)
CALCIUM SERPL-MCNC: 8.7 MG/DL (ref 8.6–10.3)
CHLORIDE SERPL-SCNC: 99 MMOL/L (ref 98–107)
CO2 SERPL-SCNC: 33 MMOL/L (ref 21–32)
CREAT SERPL-MCNC: 1.16 MG/DL (ref 0.5–1.05)
CREAT UR-MCNC: 82.8 MG/DL (ref 20–320)
EGFRCR SERPLBLD CKD-EPI 2021: 44 ML/MIN/1.73M*2
EOSINOPHIL # BLD AUTO: 0.05 X10*3/UL (ref 0–0.4)
EOSINOPHIL NFR BLD AUTO: 0.9 %
ERYTHROCYTE [DISTWIDTH] IN BLOOD BY AUTOMATED COUNT: 13.9 % (ref 11.5–14.5)
EST. AVERAGE GLUCOSE BLD GHB EST-MCNC: 126 MG/DL
GLUCOSE SERPL-MCNC: 141 MG/DL (ref 74–99)
HBA1C MFR BLD: 6 %
HCT VFR BLD AUTO: 48.4 % (ref 36–46)
HGB BLD-MCNC: 15.7 G/DL (ref 12–16)
IMM GRANULOCYTES # BLD AUTO: 0.01 X10*3/UL (ref 0–0.5)
IMM GRANULOCYTES NFR BLD AUTO: 0.2 % (ref 0–0.9)
LYMPHOCYTES # BLD AUTO: 1.05 X10*3/UL (ref 0.8–3)
LYMPHOCYTES NFR BLD AUTO: 19.9 %
MCH RBC QN AUTO: 29 PG (ref 26–34)
MCHC RBC AUTO-ENTMCNC: 32.4 G/DL (ref 32–36)
MCV RBC AUTO: 89 FL (ref 80–100)
MICROALBUMIN UR-MCNC: 30.6 MG/L
MICROALBUMIN/CREAT UR: 37 UG/MG CREAT
MONOCYTES # BLD AUTO: 0.18 X10*3/UL (ref 0.05–0.8)
MONOCYTES NFR BLD AUTO: 3.4 %
NEUTROPHILS # BLD AUTO: 3.97 X10*3/UL (ref 1.6–5.5)
NEUTROPHILS NFR BLD AUTO: 75.2 %
NRBC BLD-RTO: 0 /100 WBCS (ref 0–0)
PLATELET # BLD AUTO: 155 X10*3/UL (ref 150–450)
POTASSIUM SERPL-SCNC: 3.6 MMOL/L (ref 3.5–5.3)
PROT SERPL-MCNC: 7 G/DL (ref 6.4–8.2)
RBC # BLD AUTO: 5.42 X10*6/UL (ref 4–5.2)
SODIUM SERPL-SCNC: 141 MMOL/L (ref 136–145)
T3FREE SERPL-MCNC: 2.7 PG/ML (ref 2.3–4.2)
TSH SERPL-ACNC: 2.64 MIU/L (ref 0.44–3.98)
WBC # BLD AUTO: 5.3 X10*3/UL (ref 4.4–11.3)

## 2024-09-19 PROCEDURE — 83036 HEMOGLOBIN GLYCOSYLATED A1C: CPT

## 2024-09-19 PROCEDURE — 85025 COMPLETE CBC W/AUTO DIFF WBC: CPT

## 2024-09-19 PROCEDURE — 82306 VITAMIN D 25 HYDROXY: CPT

## 2024-09-19 PROCEDURE — 80053 COMPREHEN METABOLIC PANEL: CPT

## 2024-09-19 PROCEDURE — 84481 FREE ASSAY (FT-3): CPT

## 2024-09-19 PROCEDURE — 36415 COLL VENOUS BLD VENIPUNCTURE: CPT

## 2024-09-19 PROCEDURE — 82570 ASSAY OF URINE CREATININE: CPT

## 2024-09-19 PROCEDURE — 82330 ASSAY OF CALCIUM: CPT

## 2024-09-19 PROCEDURE — 84443 ASSAY THYROID STIM HORMONE: CPT

## 2024-09-19 PROCEDURE — 82043 UR ALBUMIN QUANTITATIVE: CPT

## 2024-09-24 ENCOUNTER — APPOINTMENT (OUTPATIENT)
Dept: PRIMARY CARE | Facility: CLINIC | Age: 89
End: 2024-09-24
Payer: COMMERCIAL

## 2024-09-24 VITALS
OXYGEN SATURATION: 97 % | HEIGHT: 60 IN | DIASTOLIC BLOOD PRESSURE: 71 MMHG | RESPIRATION RATE: 16 BRPM | SYSTOLIC BLOOD PRESSURE: 112 MMHG | HEART RATE: 74 BPM | TEMPERATURE: 97.4 F | BODY MASS INDEX: 24.45 KG/M2

## 2024-09-24 DIAGNOSIS — E03.9 ACQUIRED HYPOTHYROIDISM: ICD-10-CM

## 2024-09-24 DIAGNOSIS — I48.19 PERSISTENT ATRIAL FIBRILLATION (MULTI): ICD-10-CM

## 2024-09-24 DIAGNOSIS — E11.9 TYPE 2 DIABETES MELLITUS WITHOUT COMPLICATION, WITHOUT LONG-TERM CURRENT USE OF INSULIN (MULTI): Primary | ICD-10-CM

## 2024-09-24 DIAGNOSIS — M81.0 AGE-RELATED OSTEOPOROSIS WITHOUT CURRENT PATHOLOGICAL FRACTURE: ICD-10-CM

## 2024-09-24 DIAGNOSIS — E78.5 HYPERLIPIDEMIA, UNSPECIFIED HYPERLIPIDEMIA TYPE: ICD-10-CM

## 2024-09-24 DIAGNOSIS — E53.8 VITAMIN B12 DEFICIENCY: ICD-10-CM

## 2024-09-24 DIAGNOSIS — F41.9 ANXIETY: ICD-10-CM

## 2024-09-24 DIAGNOSIS — N18.30 STAGE 3 CHRONIC KIDNEY DISEASE, UNSPECIFIED WHETHER STAGE 3A OR 3B CKD (MULTI): ICD-10-CM

## 2024-09-24 DIAGNOSIS — I49.3 FREQUENT PVCS: ICD-10-CM

## 2024-09-24 DIAGNOSIS — S06.361S: ICD-10-CM

## 2024-09-24 DIAGNOSIS — S06.369S: ICD-10-CM

## 2024-09-24 DIAGNOSIS — R73.9 HYPERGLYCEMIA: ICD-10-CM

## 2024-09-24 DIAGNOSIS — I10 ESSENTIAL HYPERTENSION, BENIGN: ICD-10-CM

## 2024-09-24 DIAGNOSIS — R42 LIGHTHEADEDNESS: ICD-10-CM

## 2024-09-24 PROBLEM — S06.36AA: Status: RESOLVED | Noted: 2023-03-02 | Resolved: 2024-09-24

## 2024-09-24 PROCEDURE — 90662 IIV NO PRSV INCREASED AG IM: CPT | Performed by: INTERNAL MEDICINE

## 2024-09-24 PROCEDURE — 99214 OFFICE O/P EST MOD 30 MIN: CPT | Performed by: INTERNAL MEDICINE

## 2024-09-24 PROCEDURE — G0009 ADMIN PNEUMOCOCCAL VACCINE: HCPCS | Performed by: INTERNAL MEDICINE

## 2024-09-24 PROCEDURE — 3078F DIAST BP <80 MM HG: CPT | Performed by: INTERNAL MEDICINE

## 2024-09-24 PROCEDURE — G0008 ADMIN INFLUENZA VIRUS VAC: HCPCS | Performed by: INTERNAL MEDICINE

## 2024-09-24 PROCEDURE — 1036F TOBACCO NON-USER: CPT | Performed by: INTERNAL MEDICINE

## 2024-09-24 PROCEDURE — 90677 PCV20 VACCINE IM: CPT | Performed by: INTERNAL MEDICINE

## 2024-09-24 PROCEDURE — 1159F MED LIST DOCD IN RCRD: CPT | Performed by: INTERNAL MEDICINE

## 2024-09-24 PROCEDURE — 3074F SYST BP LT 130 MM HG: CPT | Performed by: INTERNAL MEDICINE

## 2024-09-24 RX ORDER — AMLODIPINE BESYLATE 2.5 MG/1
2.5 TABLET ORAL DAILY
Qty: 90 TABLET | Refills: 3 | Status: SHIPPED | OUTPATIENT
Start: 2024-09-24 | End: 2025-09-24

## 2024-09-24 RX ORDER — PAROXETINE HYDROCHLORIDE 20 MG/1
20 TABLET, FILM COATED ORAL DAILY
Qty: 90 TABLET | Refills: 3 | Status: SHIPPED | OUTPATIENT
Start: 2024-09-24

## 2024-09-24 SDOH — ECONOMIC STABILITY: FOOD INSECURITY: WITHIN THE PAST 12 MONTHS, THE FOOD YOU BOUGHT JUST DIDN'T LAST AND YOU DIDN'T HAVE MONEY TO GET MORE.: NEVER TRUE

## 2024-09-24 SDOH — ECONOMIC STABILITY: FOOD INSECURITY: WITHIN THE PAST 12 MONTHS, YOU WORRIED THAT YOUR FOOD WOULD RUN OUT BEFORE YOU GOT MONEY TO BUY MORE.: NEVER TRUE

## 2024-09-24 ASSESSMENT — LIFESTYLE VARIABLES
SKIP TO QUESTIONS 9-10: 1
AUDIT-C TOTAL SCORE: 0
HOW MANY STANDARD DRINKS CONTAINING ALCOHOL DO YOU HAVE ON A TYPICAL DAY: PATIENT DOES NOT DRINK
HOW OFTEN DO YOU HAVE A DRINK CONTAINING ALCOHOL: NEVER
HOW OFTEN DO YOU HAVE SIX OR MORE DRINKS ON ONE OCCASION: NEVER

## 2024-09-24 ASSESSMENT — PATIENT HEALTH QUESTIONNAIRE - PHQ9
SUM OF ALL RESPONSES TO PHQ9 QUESTIONS 1 & 2: 0
1. LITTLE INTEREST OR PLEASURE IN DOING THINGS: NOT AT ALL
2. FEELING DOWN, DEPRESSED OR HOPELESS: NOT AT ALL

## 2024-09-24 NOTE — ASSESSMENT & PLAN NOTE
Patient is aware of atrial fib, she does not take anticoagulant therapy, she used to take ASA prior to intracranial hemorrhage. Will monitor for now

## 2024-09-24 NOTE — ASSESSMENT & PLAN NOTE
Continue Carvedilol, Jardiance and  Furosemide, she has diastolic HF, she is lightheaded. Will decrease the amlodipine dose , see if symptoms improve

## 2024-09-24 NOTE — PROGRESS NOTES
"Chief Complaint/HPI:    Traumatic Cerebral Hemorrhage: She is followed by Neurology. No recent falls or head traumas. It occurred 2-3 years ago.      pSVT: No recent complaints. She is seen by Cardiology, Dr. Aguirre.     HTN: She is currently on Carvedilol 25 mg, Amlodipine 5 mg, Furosemide 40 mg. She continues to follow-up with Cardiology, Dr. Aguirre every 6 months. Echocardiogram done in May 2024 showed normal left ventricular systolic function with an ejection fraction of 60%, mildly reduced right ventricular systolic function, mild to moderate mitral valve regurgitation, moderate tricuspid valve regurgitation. Patient admits that she has felt \"terribly lightheaded\" , this is present \"all the time\"     Renal insufficiency: She was followed by Nephrology, Dr. Brewster. She takes Lasix 40 mg. She has not seen nephrology for some time, she states     Hypothyroidism: She is currently on Levothyroxine 50 mcg. She is aware of taking the medication on an empty stomach prior to breakfast.      HLD: Lipid panel performed, takes no meds  ,   Osteoporosis: She is on Prolia. No recent DEXA scans to review. Prolia requires renewal     Anxiety: Takes Paroxetine 20 mg.     Vitamin B-12/D deficiency: She no longer takes vitamin D. She no longer takes B12 she states     Hyperglycemia/DM Type II: patient takes Jardiance 10 mg. A1C 6. She has had two consecutive fasting glucose levels over 126. States the Jardiance is making her feel light headed.    ROS otherwise negative aside from what was mentioned above in HPI.      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    Frequent PVCs    Hyperlipidemia    Hypothyroidism    Lightheadedness    Orthostatic dizziness    Osteoporosis    PAC (premature atrial contraction)    Paroxysmal SVT (supraventricular tachycardia) (CMS-HCC)    Renal insufficiency    Syncope    Transient LOC (loss of consciousness)    Unsteady gait    Vertigo, benign positional    Vitamin B12 deficiency    Chronic pain    Binocular vision disorder with diplopia    Breast cyst    Hypertension    Coccyx pain    Iliotibial band syndrome of right side    Pancreas cyst (HHS-HCC)    Ptosis of eyelid, left    Spinal stenosis, lumbar region, without neurogenic claudication    Spondylosis without myelopathy or radiculopathy, lumbar region    Trochanteric bursitis of right hip    Other herpes zoster eye disease    Primary osteoarthritis of left knee    Persistent atrial fibrillation (Multi)    Benign paroxysmal positional vertigo of right ear    Hyperglycemia    Bilateral tinnitus    Sensorineural hearing loss (SNHL) of both ears    Intracranial hemorrhage (Multi)    Pulmonary emphysema, unspecified emphysema type (Multi)    Stage 3 chronic kidney disease, unspecified whether stage 3a or 3b CKD (Multi)    Pre-diabetes    (HFpEF) heart failure with preserved ejection fraction (Multi)    Mitral valve regurgitation    Nonrheumatic tricuspid valve regurgitation    Type 2 diabetes mellitus (Multi)         Past Medical History:   Diagnosis Date    Contact with and (suspected) exposure to covid-19 11/26/2022    Fall in home 11/26/2022    Comment on above: FALL AT HOME 11AM    Impacted cerumen 04/18/2024     Past Surgical History:   Procedure Laterality Date    OTHER SURGICAL HISTORY  12/10/2019    Hysterectomy    OTHER SURGICAL HISTORY  12/10/2019    Lumpectomy     Social History     Social History Narrative    Not on file         ALLERGIES  Lisinopril      MEDICATIONS  Current Outpatient Medications on File Prior to Visit   Medication Sig Dispense Refill    carvedilol  (Coreg) 25 mg tablet TAKE 1 TABLET TWICE A  tablet 3    furosemide (Lasix) 40 mg tablet Take 1 tablet (40 mg) by mouth once daily. 90 tablet 3    Jardiance 10 mg TAKE 1 TABLET DAILY 90 tablet 3    levothyroxine (Synthroid, Levoxyl) 50 mcg tablet TAKE 1 TABLET DAILY IN THE MORNING BEFORE A MEAL 90 tablet 3    [DISCONTINUED] amLODIPine (Norvasc) 5 mg tablet TAKE 1 TABLET DAILY 90 tablet 3    [DISCONTINUED] PARoxetine (Paxil) 20 mg tablet TAKE 1 TABLET DAILY 90 tablet 3    denosumab (Prolia) 60 mg/mL syringe Inject 1 mL (60 mg total) under the skin 1 time for 1 dose. 1 mL 1     No current facility-administered medications on file prior to visit.         PHYSICAL EXAM  /71 (BP Location: Right arm, Patient Position: Sitting, BP Cuff Size: Adult)   Pulse 74   Temp 36.3 °C (97.4 °F)   Resp 16   Ht 1.524 m (5')   SpO2 97%   BMI 24.45 kg/m²   Body mass index is 24.45 kg/m².  Constitutional:       Appearance: Normal appearance. She is normal weight. She is not ill-appearing. Accompanied by daughter, sitting in wheelchair today  HENT:      Head: Normocephalic and atraumatic. No neck masses or thyromegaly     Right Ear: External ear normal.      Left Ear: External ear normal.      Eyes:      Extraocular Movements: Extraocular movements intact.      Conjunctiva/sclera: Conjunctivae normal.   Cardiovascular:      Rate and Rhythm: regular rate present. Rhythm irregular.      Pulses: Normal pulses.      Heart sounds: Normal heart sounds. No carotid bruits noted     Comments: Irregular rate and rhythm today noted, patient feels well this is not new      Edema has decreased, no significant edema noted today  Pulmonary:      Effort: Pulmonary effort is normal.      Breath sounds: No wheezes, no rhonchi, diminished breath sounds in the bases bilaterally   Chest:      Chest wall: No tenderness.   Musculoskeletal:         General: Normal range of motion.      Cervical back: Normal range of motion and neck supple. No  tenderness.     Significant no edema noted today   Lymphadenopathy:      Cervical: No cervical adenopathy.   Skin:     General: Skin is warm and dry.      Findings: No rash.   Neurological:      General: No focal deficit present.      Mental Status: She is alert and oriented to person, place, and time.      Motor: No weakness.      Gait: Gait normal.      Deep Tendon Reflexes: Reflexes normal.   Psychiatric:         Mood and Affect: Mood normal.         Behavior: Behavior normal.         Thought Content: Thought content normal.         Judgment: Judgment normal.     ASSESSMENT/PLAN  Problem List Items Addressed This Visit       Anxiety    Current Assessment & Plan     Continue Paxil.         Relevant Medications    PARoxetine (Paxil) 20 mg tablet    Other Relevant Orders    CBC and Auto Differential    Comprehensive Metabolic Panel    Essential hypertension, benign    Current Assessment & Plan     Continue Carvedilol, Jardiance and  Furosemide, she has diastolic HF, she is lightheaded. Will decrease the amlodipine dose , see if symptoms improve         Relevant Medications    amLODIPine (Norvasc) 2.5 mg tablet    Other Relevant Orders    CBC and Auto Differential    Comprehensive Metabolic Panel    Albumin-Creatinine Ratio, Urine Random    Frequent PVCs    Current Assessment & Plan     Patient has some irregular beats, this is not new, continue to monitor, follow up with cardiology          Relevant Medications    amLODIPine (Norvasc) 2.5 mg tablet    Other Relevant Orders    CBC and Auto Differential    Comprehensive Metabolic Panel    Hyperglycemia    Current Assessment & Plan     Takes Jardiance, continue current dose, monitor HgbA1C         Relevant Orders    CBC and Auto Differential    Comprehensive Metabolic Panel    Hyperlipidemia    Current Assessment & Plan     Stable. Not currently on medication.         Relevant Orders    CBC and Auto Differential    Comprehensive Metabolic Panel    Lipid Panel     Hypothyroidism    Relevant Orders    CBC and Auto Differential    Comprehensive Metabolic Panel    TSH with reflex to Free T4 if abnormal    Lightheadedness    Current Assessment & Plan     Decrease the dose of amlodipine         Relevant Orders    CBC and Auto Differential    Comprehensive Metabolic Panel    Osteoporosis    Current Assessment & Plan     Continue Prolia. It is ordered, monitor vitamin d levels also         Relevant Orders    CBC and Auto Differential    Comprehensive Metabolic Panel    Vitamin D 25-Hydroxy,Total (for eval of Vitamin D levels)    Stage 3 chronic kidney disease, unspecified whether stage 3a or 3b CKD (Multi)    Relevant Orders    CBC and Auto Differential    Comprehensive Metabolic Panel    RESOLVED: Traumatic cerebral hemorrhage (Multi)    Type 2 diabetes mellitus (Multi) - Primary    Current Assessment & Plan     Continue Jardiance. Continue to monitor.         Relevant Orders    CBC and Auto Differential    Comprehensive Metabolic Panel    Hemoglobin A1C    Albumin-Creatinine Ratio, Urine Random    Vitamin B12 deficiency    Relevant Orders    CBC and Auto Differential    Comprehensive Metabolic Panel     Prevnar 20 and flu vaccine ordered today. Follow up in 6 months and complete blood work as ordered.  May follow up in 6 months     Sulaiman Santoyo MD

## 2024-09-24 NOTE — ASSESSMENT & PLAN NOTE
Patient has some irregular beats, this is not new, continue to monitor, follow up with cardiology

## 2024-10-02 ENCOUNTER — APPOINTMENT (OUTPATIENT)
Dept: ENDOCRINOLOGY | Facility: CLINIC | Age: 89
End: 2024-10-02
Payer: COMMERCIAL

## 2024-12-10 RX ORDER — EPINEPHRINE 0.3 MG/.3ML
0.3 INJECTION SUBCUTANEOUS EVERY 5 MIN PRN
Status: CANCELLED | OUTPATIENT
Start: 2024-12-17

## 2024-12-10 RX ORDER — DIPHENHYDRAMINE HYDROCHLORIDE 50 MG/ML
50 INJECTION INTRAMUSCULAR; INTRAVENOUS AS NEEDED
Status: CANCELLED | OUTPATIENT
Start: 2024-12-17

## 2024-12-10 RX ORDER — ALBUTEROL SULFATE 0.83 MG/ML
3 SOLUTION RESPIRATORY (INHALATION) AS NEEDED
Status: CANCELLED | OUTPATIENT
Start: 2024-12-17

## 2024-12-10 RX ORDER — FAMOTIDINE 10 MG/ML
20 INJECTION INTRAVENOUS ONCE AS NEEDED
Status: CANCELLED | OUTPATIENT
Start: 2024-12-17

## 2024-12-20 ENCOUNTER — INFUSION (OUTPATIENT)
Dept: INFUSION THERAPY | Facility: HOSPITAL | Age: 89
End: 2024-12-20
Payer: COMMERCIAL

## 2024-12-20 VITALS
TEMPERATURE: 98.7 F | HEART RATE: 84 BPM | OXYGEN SATURATION: 99 % | SYSTOLIC BLOOD PRESSURE: 115 MMHG | DIASTOLIC BLOOD PRESSURE: 73 MMHG | RESPIRATION RATE: 18 BRPM

## 2024-12-20 DIAGNOSIS — M81.0 AGE-RELATED OSTEOPOROSIS WITHOUT CURRENT PATHOLOGICAL FRACTURE: ICD-10-CM

## 2024-12-20 PROCEDURE — 96372 THER/PROPH/DIAG INJ SC/IM: CPT | Performed by: INTERNAL MEDICINE

## 2024-12-20 PROCEDURE — 2500000004 HC RX 250 GENERAL PHARMACY W/ HCPCS (ALT 636 FOR OP/ED): Performed by: INTERNAL MEDICINE

## 2024-12-20 RX ORDER — FAMOTIDINE 10 MG/ML
20 INJECTION INTRAVENOUS ONCE AS NEEDED
OUTPATIENT
Start: 2025-03-18

## 2024-12-20 RX ORDER — EPINEPHRINE 0.3 MG/.3ML
0.3 INJECTION SUBCUTANEOUS EVERY 5 MIN PRN
OUTPATIENT
Start: 2025-03-18

## 2024-12-20 RX ORDER — DIPHENHYDRAMINE HYDROCHLORIDE 50 MG/ML
50 INJECTION INTRAMUSCULAR; INTRAVENOUS AS NEEDED
OUTPATIENT
Start: 2025-03-18

## 2024-12-20 RX ORDER — ALBUTEROL SULFATE 0.83 MG/ML
3 SOLUTION RESPIRATORY (INHALATION) AS NEEDED
OUTPATIENT
Start: 2025-03-18

## 2024-12-20 ASSESSMENT — ENCOUNTER SYMPTOMS
OCCASIONAL FEELINGS OF UNSTEADINESS: 1
LOSS OF SENSATION IN FEET: 0
DEPRESSION: 1

## 2024-12-20 ASSESSMENT — PAIN SCALES - GENERAL: PAINLEVEL_OUTOF10: 0-NO PAIN

## 2024-12-20 ASSESSMENT — COLUMBIA-SUICIDE SEVERITY RATING SCALE - C-SSRS
1. IN THE PAST MONTH, HAVE YOU WISHED YOU WERE DEAD OR WISHED YOU COULD GO TO SLEEP AND NOT WAKE UP?: NO
6. HAVE YOU EVER DONE ANYTHING, STARTED TO DO ANYTHING, OR PREPARED TO DO ANYTHING TO END YOUR LIFE?: NO
2. HAVE YOU ACTUALLY HAD ANY THOUGHTS OF KILLING YOURSELF?: NO

## 2024-12-20 ASSESSMENT — PATIENT HEALTH QUESTIONNAIRE - PHQ9
2. FEELING DOWN, DEPRESSED OR HOPELESS: NOT AT ALL
1. LITTLE INTEREST OR PLEASURE IN DOING THINGS: NOT AT ALL
SUM OF ALL RESPONSES TO PHQ9 QUESTIONS 1 AND 2: 0

## 2025-01-01 ENCOUNTER — APPOINTMENT (OUTPATIENT)
Dept: RADIOLOGY | Facility: HOSPITAL | Age: OVER 89
DRG: 542 | End: 2025-01-01
Payer: COMMERCIAL

## 2025-01-01 ENCOUNTER — APPOINTMENT (OUTPATIENT)
Dept: CARDIOLOGY | Facility: HOSPITAL | Age: OVER 89
DRG: 542 | End: 2025-01-01
Payer: COMMERCIAL

## 2025-01-01 PROCEDURE — 93005 ELECTROCARDIOGRAM TRACING: CPT

## 2025-01-01 PROCEDURE — 71045 X-RAY EXAM CHEST 1 VIEW: CPT

## 2025-01-01 PROCEDURE — 72128 CT CHEST SPINE W/O DYE: CPT

## 2025-01-01 PROCEDURE — 93325 DOPPLER ECHO COLOR FLOW MAPG: CPT | Performed by: INTERNAL MEDICINE

## 2025-01-01 PROCEDURE — 72125 CT NECK SPINE W/O DYE: CPT

## 2025-01-01 PROCEDURE — 93308 TTE F-UP OR LMTD: CPT | Performed by: INTERNAL MEDICINE

## 2025-01-01 PROCEDURE — 93321 DOPPLER ECHO F-UP/LMTD STD: CPT

## 2025-01-01 PROCEDURE — 72131 CT LUMBAR SPINE W/O DYE: CPT

## 2025-01-01 PROCEDURE — 74018 RADEX ABDOMEN 1 VIEW: CPT

## 2025-01-01 PROCEDURE — 70450 CT HEAD/BRAIN W/O DYE: CPT

## 2025-01-01 PROCEDURE — 93321 DOPPLER ECHO F-UP/LMTD STD: CPT | Performed by: INTERNAL MEDICINE

## 2025-01-01 PROCEDURE — 72192 CT PELVIS W/O DYE: CPT

## 2025-01-06 DIAGNOSIS — R73.9 HYPERGLYCEMIA: ICD-10-CM

## 2025-01-06 DIAGNOSIS — N18.2 CHRONIC RENAL IMPAIRMENT, STAGE 2 (MILD): ICD-10-CM

## 2025-01-06 DIAGNOSIS — I50.32 CHRONIC DIASTOLIC HEART FAILURE WITH PRESERVED EJECTION FRACTION: ICD-10-CM

## 2025-01-06 RX ORDER — EMPAGLIFLOZIN 10 MG/1
10 TABLET, FILM COATED ORAL DAILY
Qty: 90 TABLET | Refills: 3 | Status: SHIPPED | OUTPATIENT
Start: 2025-01-06

## 2025-01-23 ENCOUNTER — TELEPHONE (OUTPATIENT)
Dept: CARDIOLOGY | Facility: HOSPITAL | Age: OVER 89
End: 2025-01-23
Payer: COMMERCIAL

## 2025-01-23 NOTE — TELEPHONE ENCOUNTER
Called patient back about their VM needing to confirm their appt.    Confirmed 2/5 at 12:30pm.    Thank you!  Robb BANERJEE

## 2025-01-30 LAB
ANION GAP SERPL CALCULATED.4IONS-SCNC: 11 MMOL/L (CALC) (ref 7–17)
BNP SERPL-MCNC: 234 PG/ML
BUN SERPL-MCNC: 30 MG/DL (ref 7–25)
BUN/CREAT SERPL: 27 (CALC) (ref 6–22)
CALCIUM SERPL-MCNC: 9.2 MG/DL (ref 8.6–10.4)
CHLORIDE SERPL-SCNC: 101 MMOL/L (ref 98–110)
CO2 SERPL-SCNC: 29 MMOL/L (ref 20–32)
CREAT SERPL-MCNC: 1.13 MG/DL (ref 0.6–0.95)
EGFRCR SERPLBLD CKD-EPI 2021: 45 ML/MIN/1.73M2
ERYTHROCYTE [DISTWIDTH] IN BLOOD BY AUTOMATED COUNT: 13.6 % (ref 11–15)
GLUCOSE SERPL-MCNC: 138 MG/DL (ref 65–99)
HCT VFR BLD AUTO: 46.8 % (ref 35–45)
HGB BLD-MCNC: 14.9 G/DL (ref 11.7–15.5)
MAGNESIUM SERPL-MCNC: 2.1 MG/DL (ref 1.5–2.5)
MCH RBC QN AUTO: 29.8 PG (ref 27–33)
MCHC RBC AUTO-ENTMCNC: 31.8 G/DL (ref 32–36)
MCV RBC AUTO: 93.6 FL (ref 80–100)
PLATELET # BLD AUTO: 193 THOUSAND/UL (ref 140–400)
PMV BLD REES-ECKER: 9.5 FL (ref 7.5–12.5)
POTASSIUM SERPL-SCNC: 3.4 MMOL/L (ref 3.5–5.3)
RBC # BLD AUTO: 5 MILLION/UL (ref 3.8–5.1)
SODIUM SERPL-SCNC: 141 MMOL/L (ref 135–146)
WBC # BLD AUTO: 5.6 THOUSAND/UL (ref 3.8–10.8)

## 2025-02-01 NOTE — PROGRESS NOTES
[facility]    Patient Name: Gladys Salas  Patient : 1929      Scribe Attestation  By signing my name below, IAmanda Scribe   attest that this documentation has been prepared under the direction and in the presence of Jag Aguirre DO.      Reason for visit:  This is a 89-year-old female here for follow-up regarding her history of HFpEF, persistent atrial fibrillation not currently on anticoagulation as she has declined in the past, PSVT, mitral/tricuspid valve regurgitation, hypertension, hypothyroidism, and lightheadedness.      HPI:  This is a 89-year-old female here for follow-up regarding her history of HFpEF, persistent atrial fibrillation not currently on anticoagulation as she has declined in the past, PSVT, mitral/tricuspid valve regurgitation, hypertension, hypothyroidism, and lightheadedness.  The patient was last evaluated by me in 2024.  At that visit I ordered blood work including BMP/BNP/CBC/magnesium to be drawn in 6 months, and asked the patient to follow-up in 6 months and sooner if necessary.  BMP done in 2025 showed serum sodium of 141, serum potassium of 3.4 and a serum creatinine of 1.13 consistent with known CKD, serum magnesium was 2.1, BNP was 234. CBC showed hemoglobin of 14.9. Hemoglobin A1c done in 2024 was 6.0%, TSH done in 2024 was 2.64. ECG done today showed atrial fibrillation with a heart rate of 82 bpm.  The patient reports feeling lightheaded since she started taking Jardiance. She denies any new chest pain, shortness of breath, and palpitations. She states that her blood pressure at home is usually controlled in the 110s range. She states that she takes all of her medications as prescribed. During my exam, she was resting comfortably on the exam table.            Assessment/Plan:   1. HFpEF  The patient has a history of HFpEF.   Echocardiogram done in May 2024 showed normal left  ventricular systolic function with an ejection fraction of 60%, mildly reduced right ventricular systolic function, mild to moderate mitral valve regurgitation, moderate tricuspid valve regurgitation.  She does not appear significantly volume overloaded on exam today.  I will discontinue amlodipine and start spironolactone 12.5 mg daily.  She should continue other cardiac medications.  Recent lab works as noted in the HPI.   Lab works will be updated in 2 weeks.  Echocardiogram and lab works as noted below will be done in 6 months prior to his next visit.   I discussed with her the importance of following a low-sodium heart healthy diet, wearing compression stockings and elevating legs when seated.   Follow up in 6 months and sooner if necessary.      2. Persistent atrial fibrillation  The patient has a history of persistent atrial fibrillation not currently on anticoagulation as patient has declined in the past.  ECG done today showed atrial fibrillation with a heart rate of 82 bpm.   She denies chest pain, palpitations or lightheadedness.   Discussed her risks for thromboembolic events such as stroke, as well as the risks/benefits anticoagulation therapy. She expressed understanding and declined anticoagulation at this time.   She should continue carvedilol for heart rate control.  Echocardiogram done in May 2024 showed normal left ventricular systolic function with an ejection fraction of 60%, mildly reduced right ventricular systolic function, mild to moderate mitral valve regurgitation, moderate tricuspid valve regurgitation.  Recent lab works as noted in the HPI.   Lab works will be updated in 2 weeks.  Echocardiogram and lab works as noted below will be done in 6 months prior to his next visit.   Follow up in 6 months and sooner if necessary.      3. PSVT  The patient has a history of PSVT.  ECG done today showed atrial fibrillation with a heart rate of 82 bpm.   She denies chest pain, palpitations or  shortness of breath.   She should continue current dose of carvedilol for heart rate control.   Echocardiogram done in May 2024 showed normal left ventricular systolic function with an ejection fraction of 60%, mildly reduced right ventricular systolic function, mild to moderate mitral valve regurgitation, moderate tricuspid valve regurgitation.  Recent lab works as noted in the HPI.   Lab works will be updated in 2 weeks.  Echocardiogram and lab works as noted below will be done in 6 months prior to his next visit.   Patient should follow general recommendations including avoiding excessive alcohol intake, avoiding excessive caffeine intake, staying well-hydrated, getting an appropriate amount of sleep.  Follow up in 6 months and sooner if necessary.      4. Mitral/tricuspid valve regurgitation  The patient has a history of mitral/tricuspid valve regurgitation.  Echocardiogram done in May 2024 showed normal left ventricular systolic function with an ejection fraction of 60%, mildly reduced right ventricular systolic function, mild to moderate mitral valve regurgitation, moderate tricuspid valve regurgitation.  Echocardiogram will be updated in 6 months.     5. Hypertension  The patient has a history of hypertension and blood pressure appears moderately controlled on exam today.   I will discontinue amlodipine and start spironolactone 12.5 mg daily.  Lab works will be updated in 2 weeks.  She should continue her other antihypertensive medications and monitor her blood pressure at home.   Continue risk factor modification.     6. Hypothyroidism  TSH done in September 2024 was 2.64.  Management as per PCP.     7. Lightheadedness  The patient reports feeling lightheaded most of the time since she started taking Jardiance.  ECG done today showed atrial fibrillation with a heart rate of 82 bpm.   Blood pressure appears moderately controlled on exam today.  I will discontinue amlodipine and start spironolactone 12.5 mg  daily.  Echocardiogram done in May 2024 showed normal left ventricular systolic function with an ejection fraction of 60%, mildly reduced right ventricular systolic function, mild to moderate mitral valve regurgitation, moderate tricuspid valve regurgitation.  Recent lab works as noted in the HPI.   Lab works will be updated in 2 weeks.  Echocardiogram and lab works as noted below will be done in 6 months prior to his next visit.   Patient should follow general recommendations including staying well-hydrated, changing the positions slowly, wearing compression stockings as necessary, and routine exercise.     8. Hypokalemia  Serum potassium done in January 2025 was 3.4.  Start spironolactone 12.5 mg daily.  Lab works will be updated in 2 weeks and again in 6 months.  Patient should include potassium-rich food in her diet.     Orders:   Discontinue amlodipine and start spironolactone 12.5 mg daily.  BMP/BNP/Mg in 2 weeks  BMP/BNP/CBC/magnesium in 6 months,   Echocardiogram in 6 months,   Follow-up in 6 months.    Lifestyle Recommendations  I recommend a whole-food plant-based diet, an eating pattern that encourages the consumption of unrefined plant foods (such as fruits, vegetables, tubers, whole grains, legumes, nuts and seeds) and discourages meats, dairy products, eggs and processed foods.     The AHA/ACC recommends that the patient consume a dietary pattern that emphasizes intake of vegetables, fruits, and whole grains; includes low-fat dairy products, poultry, fish, legumes, non-tropical vegetable oils, and nuts; and limits intake of sodium, sweets, sugar-sweetened beverages, and red meats.  Adapt this dietary pattern to appropriate calorie requirements (a 500-750 kcal/day deficit to loose weight), personal and cultural food preferences, and nutrition therapy for other medical conditions (including diabetes).  Achieve this pattern by following plans such as the [place] Mediterranean, DASH dietary pattern, or AHA  diet.     Engage in 2 hours and 30 minutes per week of moderate-intensity physical activity, or 1 hour and 15 minutes (75 minutes) per week of vigorous-intensity aerobic physical activity, or an equivalent combination of moderate and vigorous-intensity aerobic physical activity. Aerobic activity should be performed in episodes of at least 10 minutes preferably spread throughout the week.     Adhering to a heart healthy diet, regular exercise habits, avoidance of tobacco products, and maintenance of a healthy weight are crucial components of their heart disease risk reduction.     Any positive review of systems not specifically addressed in the office visit today should be evaluated and treated by the patients primary care physician or in an emergency department if necessary     Patient was notified that results from ordered tests will be called to the patient if it changes current management; it will otherwise be discussed at a future appointment and available on  [facility] [place].     Thank you for allowing me to participate in the care of this patient.        This document was generated using the assistance of voice recognition software. If there are any errors of spelling, grammar, syntax, or meaning; please feel free to contact me directly for clarification.    Past Medical History:  She has a past medical history of Contact with and (suspected) exposure to covid-19 (11/26/2022), Fall in home (11/26/2022), and Impacted cerumen (04/18/2024).    Past Surgical History:  She has a past surgical history that includes Other surgical history (12/10/2019) and Other surgical history (12/10/2019).      Social History:  She reports that she has never smoked. She has never been exposed to tobacco smoke. She has never used smokeless tobacco. She reports that she does not drink alcohol and does not use drugs.    Family History:  No family history on file.     Allergies:  Lisinopril    Outpatient Medications:  Current Outpatient  Medications   Medication Instructions    amLODIPine (NORVASC) 2.5 mg, oral, Daily    carvedilol (COREG) 25 mg, oral, 2 times daily    furosemide (LASIX) 40 mg, oral, Daily    Jardiance 10 mg, oral, Daily    levothyroxine (SYNTHROID, LEVOXYL) 50 mcg, oral, Daily before breakfast    PARoxetine (PAXIL) 20 mg, oral, Daily    Prolia 60 mg, subcutaneous, Once        ROS:  A 14 point review of systems was done and is negative other than as stated in HPI    Vitals:      2/8/2024     1:44 PM 2/8/2024     2:08 PM 4/18/2024     1:42 PM 6/20/2024     1:17 PM 8/14/2024     1:54 PM 9/24/2024     3:11 PM 12/20/2024     1:14 PM   Vitals   Systolic 164 138 171 130 118 112 115   Diastolic 74 70 98 84 62 71 73   BP Location   Left arm  Left arm Right arm Right arm   Heart Rate 84  95 93 80 74 84   Temp   36.4 °C (97.5 °F) 36.1 °C (97 °F)  36.3 °C (97.4 °F) 37.1 °C (98.7 °F)   Resp   16 18  16 18   Height   1.524 m (5')  1.524 m (5') 1.524 m (5')    Weight (lb) 130  127.5  125.2     BMI 25.39 kg/m2  24.9 kg/m2  24.45 kg/m2 24.45 kg/m2    BSA (m2) 1.58 m2  1.56 m2  1.55 m2 1.55 m2         Physical Exam:   Constitutional: Cooperative, in no acute distress, alert, appears stated age.   Skin: Skin color, texture, turgor normal. No rashes or lesions.   Head: Normocephalic. No masses, lesions, tenderness or abnormalities   Eyes: Extraocular movements are grossly intact.   Mouth and throat: Mucous membranes moist   Neck: Neck supple, no carotid bruits, no JVD   Respiratory: Lungs clear to auscultation, no wheezing or rhonchi, no use of accessory muscles   Chest wall: No scars, normal excursion with respiration   Cardiovascular: Irregular rhythm without murmur  Gastrointestinal: Abdomen soft, nontender. Bowel sounds normal.   Musculoskeletal: Strength equal in upper extremities   Extremities: Trace pitting edema   Neurologic: Sensation grossly intact, alert and oriented x3    Intake/Output:   No intake/output data recorded.    Outpatient  Medications  Current Outpatient Medications on File Prior to Visit   Medication Sig Dispense Refill    amLODIPine (Norvasc) 2.5 mg tablet Take 1 tablet (2.5 mg) by mouth once daily. 90 tablet 3    carvedilol (Coreg) 25 mg tablet TAKE 1 TABLET TWICE A  tablet 3    denosumab (Prolia) 60 mg/mL syringe Inject 1 mL (60 mg total) under the skin 1 time for 1 dose. 1 mL 1    furosemide (Lasix) 40 mg tablet Take 1 tablet (40 mg) by mouth once daily. 90 tablet 3    Jardiance 10 mg TAKE 1 TABLET DAILY 90 tablet 3    levothyroxine (Synthroid, Levoxyl) 50 mcg tablet TAKE 1 TABLET DAILY IN THE MORNING BEFORE A MEAL 90 tablet 3    PARoxetine (Paxil) 20 mg tablet Take 1 tablet (20 mg) by mouth once daily. 90 tablet 3     No current facility-administered medications on file prior to visit.       Labs: (past 26 weeks)  Recent Results (from the past 26 weeks)   Hemoglobin A1c    Collection Time: 09/19/24  8:17 AM   Result Value Ref Range    Hemoglobin A1C 6.0 (H) See comment %    Estimated Average Glucose 126 Not Established mg/dL   Calcium, ionized    Collection Time: 09/19/24  8:17 AM   Result Value Ref Range    POCT Calcium, Ionized 1.15 1.1 - 1.33 mmol/L   T3, free    Collection Time: 09/19/24  8:17 AM   Result Value Ref Range    Triiodothyronine, Free 2.7 2.3 - 4.2 pg/mL   Comprehensive Metabolic Panel    Collection Time: 09/19/24  8:17 AM   Result Value Ref Range    Glucose 141 (H) 74 - 99 mg/dL    Sodium 141 136 - 145 mmol/L    Potassium 3.6 3.5 - 5.3 mmol/L    Chloride 99 98 - 107 mmol/L    Bicarbonate 33 (H) 21 - 32 mmol/L    Anion Gap 13 10 - 20 mmol/L    Urea Nitrogen 26 (H) 6 - 23 mg/dL    Creatinine 1.16 (H) 0.50 - 1.05 mg/dL    eGFR 44 (L) >60 mL/min/1.73m*2    Calcium 8.7 8.6 - 10.3 mg/dL    Albumin 4.3 3.4 - 5.0 g/dL    Alkaline Phosphatase 41 33 - 136 U/L    Total Protein 7.0 6.4 - 8.2 g/dL    AST 15 9 - 39 U/L    Bilirubin, Total 1.1 0.0 - 1.2 mg/dL    ALT 8 7 - 45 U/L   CBC and Auto Differential    Collection  Time: 09/19/24  8:17 AM   Result Value Ref Range    WBC 5.3 4.4 - 11.3 x10*3/uL    nRBC 0.0 0.0 - 0.0 /100 WBCs    RBC 5.42 (H) 4.00 - 5.20 x10*6/uL    Hemoglobin 15.7 12.0 - 16.0 g/dL    Hematocrit 48.4 (H) 36.0 - 46.0 %    MCV 89 80 - 100 fL    MCH 29.0 26.0 - 34.0 pg    MCHC 32.4 32.0 - 36.0 g/dL    RDW 13.9 11.5 - 14.5 %    Platelets 155 150 - 450 x10*3/uL    Neutrophils % 75.2 40.0 - 80.0 %    Immature Granulocytes %, Automated 0.2 0.0 - 0.9 %    Lymphocytes % 19.9 13.0 - 44.0 %    Monocytes % 3.4 2.0 - 10.0 %    Eosinophils % 0.9 0.0 - 6.0 %    Basophils % 0.4 0.0 - 2.0 %    Neutrophils Absolute 3.97 1.60 - 5.50 x10*3/uL    Immature Granulocytes Absolute, Automated 0.01 0.00 - 0.50 x10*3/uL    Lymphocytes Absolute 1.05 0.80 - 3.00 x10*3/uL    Monocytes Absolute 0.18 0.05 - 0.80 x10*3/uL    Eosinophils Absolute 0.05 0.00 - 0.40 x10*3/uL    Basophils Absolute 0.02 0.00 - 0.10 x10*3/uL   TSH with reflex to Free T4 if abnormal    Collection Time: 09/19/24  8:17 AM   Result Value Ref Range    Thyroid Stimulating Hormone 2.64 0.44 - 3.98 mIU/L   Vitamin D 25-Hydroxy,Total (for eval of Vitamin D levels)    Collection Time: 09/19/24  8:17 AM   Result Value Ref Range    Vitamin D, 25-Hydroxy, Total 79 30 - 100 ng/mL   Albumin , Urine Random    Collection Time: 09/19/24  1:00 PM   Result Value Ref Range    Albumin, Urine Random 30.6 Not established mg/L    Creatinine, Urine Random 82.8 20.0 - 320.0 mg/dL    Albumin/Creatinine Ratio 37.0 (H) <30.0 ug/mg Creat   Magnesium    Collection Time: 01/29/25 11:28 AM   Result Value Ref Range    MAGNESIUM 2.1 1.5 - 2.5 mg/dL   Basic Metabolic Panel    Collection Time: 01/29/25 11:28 AM   Result Value Ref Range    GLUCOSE 138 (H) 65 - 99 mg/dL    UREA NITROGEN (BUN) 30 (H) 7 - 25 mg/dL    CREATININE 1.13 (H) 0.60 - 0.95 mg/dL    EGFR 45 (L) > OR = 60 mL/min/1.73m2    BUN/CREATININE RATIO 27 (H) 6 - 22 (calc)    SODIUM 141 135 - 146 mmol/L    POTASSIUM 3.4 (L) 3.5 - 5.3 mmol/L     CHLORIDE 101 98 - 110 mmol/L    CARBON DIOXIDE 29 20 - 32 mmol/L    ELECTROLYTE BALANCE 11 7 - 17 mmol/L (calc)    CALCIUM 9.2 8.6 - 10.4 mg/dL   CBC    Collection Time: 01/29/25 11:28 AM   Result Value Ref Range    WHITE BLOOD CELL COUNT 5.6 3.8 - 10.8 Thousand/uL    RED BLOOD CELL COUNT 5.00 3.80 - 5.10 Million/uL    HEMOGLOBIN 14.9 11.7 - 15.5 g/dL    HEMATOCRIT 46.8 (H) 35.0 - 45.0 %    MCV 93.6 80.0 - 100.0 fL    MCH 29.8 27.0 - 33.0 pg    MCHC 31.8 (L) 32.0 - 36.0 g/dL    RDW 13.6 11.0 - 15.0 %    PLATELET COUNT 193 140 - 400 Thousand/uL    MPV 9.5 7.5 - 12.5 fL   B-Type Natriuretic Peptide    Collection Time: 01/29/25 11:28 AM   Result Value Ref Range    B TYPE NATRIURETIC PEPTIDE (BNP) 234 (H) <100 pg/mL       ECG  Encounter Date: 08/14/24   ECG 12 Lead    Narrative    Atrial fibrillation, anteroseptal infarct age undetermined, heart rate 80   bpm.       Echocardiogram  No results found for this or any previous visit from the past 1095 days.      CV Studies:  EKG:  Encounter Date: 08/14/24   ECG 12 Lead    Narrative    Atrial fibrillation, anteroseptal infarct age undetermined, heart rate 80   bpm.     Echocardiogram:   Echocardiogram     [place] [address]  Phone [phone] Fax [phone]    TRANSTHORACIC ECHOCARDIOGRAM REPORT      Patient Name:     DEREK Purdy Physician:   [id number] Jag Aguirre DO  Study Date:       11/25/2022          Referring Physician: THOR OROZCO  MRN/PID:          [id number]            PCP:  Accession/Order#: [id number]           Department Location: [facility]  YOB: 1929           Fellow:  Gender:           F                   Nurse:  Admit Date:       11/23/2022          Sonographer:         Morenita Yan RDCS  Admission Status: Inpatient - Routine Additional Staff:  Height:           152.40 cm           CC Report to:  Weight:           55.34 kg            Study Type:           Echocardiogram  BSA:              1.51 m2  Blood Pressure: 109 /66 mmHg    Diagnosis/ICD: R55-Syncope  Indication:    Syncope  Procedure/CPT: Echo Complete w Full [id number]    Patient History:  Pertinent History: HTN, HLD.    Study Detail: The following Echo studies were performed: 2D, M-Mode, Doppler and  color flow.      PHYSICIAN INTERPRETATION:  Left Ventricle: Left ventricular systolic function is normal, with an estimated ejection fraction of 65-70%. There are no regional wall motion abnormalities. The left ventricular cavity size is normal. Spectral Doppler shows an impaired relaxation pattern of left ventricular diastolic filling.  Left Atrium: The left atrium is normal in size.  Right Ventricle: The right ventricle is normal in size. There is normal right ventricular global systolic function.  Right Atrium: The right atrium is normal in size.  Aortic Valve: The aortic valve is trileaflet. There is no evidence of aortic valve regurgitation. The peak instantaneous gradient of the aortic valve is 11.1 mmHg. The mean gradient of the aortic valve is 5.4 mmHg.  Mitral Valve: The mitral valve is mildly thickened. There is trace mitral valve regurgitation.  Tricuspid Valve: The tricuspid valve is structurally normal. There is trace to mild tricuspid regurgitation.  Pulmonic Valve: The pulmonic valve is structurally normal. There is no indication of pulmonic valve regurgitation.  Pericardium: There is no pericardial effusion noted. There is a pericardial fat pad present.  Aorta: The aortic root is normal.      CONCLUSIONS:  1. Left ventricular systolic function is normal with a 65-70% estimated ejection fraction.  2. Spectral Doppler shows an impaired relaxation pattern of left ventricular diastolic filling.    QUANTITATIVE DATA SUMMARY:  2D MEASUREMENTS:  Normal Ranges:  LAs:           2.78 cm   (2.7-4.0cm)  IVSd:          0.87 cm   (0.6-1.1cm)  LVPWd:         0.87 cm   (0.6-1.1cm)  LVIDd:         4.07 cm    (3.9-5.9cm)  LVIDs:         2.07 cm  LV Mass Index: 70.8 g/m2  LV % FS        49.2 %    LA VOLUME:  Normal Ranges:  LA Vol A4C:        36.0 ml    (22+/-6mL/m2)  LA Vol A2C:        21.6 ml  LA Vol BP:         28.5 ml  LA Vol Index A4C:  23.8 ml/m2  LA Vol Index A2C:  14.3 ml/m2  LA Vol Index BP:   18.8 ml/m2  LA Area A4C:       14.1 cm2  LA Area A2C:       10.7 cm2  LA Major Axis A4C: 4.7 cm  LA Major Axis A2C: 4.5 cm  LA Volume Index:   18.0 ml/m2  LA Vol A4C:        35.2 ml  LA Vol A2C:        21.3 ml    RA VOLUME BY A/L METHOD:  Normal Ranges:  RA Area A4C: 10.2 cm2    AORTA MEASUREMENTS:  Normal Ranges:  Ao Sinus, d: 2.40 cm (2.1-3.5cm)  Ao STJ, d:   2.50 cm (1.7-3.4cm)  Asc Ao, d:   3.20 cm (2.1-3.4cm)    LV SYSTOLIC FUNCTION BY 2D PLANIMETRY (MOD):  Normal Ranges:  EF-A4C View: 79.2 % (>=55%)  EF-A2C View: 69.7 %  EF-Biplane:  77.4 %    LV DIASTOLIC FUNCTION:  Normal Ranges:  MV Peak E:        0.82 m/s    (0.7-1.2 m/s)  MV Peak A:        1.18 m/s    (0.42-0.7 m/s)  E/A Ratio:        0.69        (1.0-2.2)  MV e'             0.07 m/s    (>8.0)  MV lateral e'     0.07 m/s  MV medial e'      0.07 m/s  MV A Dur:         122.74 msec  E/e' Ratio:       11.66       (<8.0)  PulmV A Revs Dur: 137.01 msec    MITRAL VALVE:  Normal Ranges:  MV DT: 244 msec (150-240msec)    AORTIC VALVE:  Normal Ranges:  AoV Vmax:                1.67 m/s  (<=1.7m/s)  AoV Peak P.1 mmHg (<20mmHg)  AoV Mean P.4 mmHg  (1.7-11.5mmHg)  LVOT Max Bam:            1.19 m/s  (<=1.1m/s)  AoV VTI:                 32.50 cm  (18-25cm)  LVOT VTI:                25.37 cm  LVOT Diameter:           1.75 cm   (1.8-2.4cm)  AoV Area, VTI:           1.88 cm2  (2.5-5.5cm2)  AoV Area,Vmax:           1.73 cm2  (2.5-4.5cm2)  AoV Dimensionless Index: 0.78    RIGHT VENTRICLE:  RV 1   3.2 cm  RV 2   2.3 cm  RV 3   5.1 cm  TAPSE: 21.1 mm  RV s'  0.15 m/s    TRICUSPID VALVE/RVSP:  Normal Ranges:  Peak TR Velocity: 3.03 m/s  RV Syst  Pressure: 39.8 mmHg (< 30mmHg)  TV E Vmax:        0.36 m/s  (0.3-0.7m/s)  TV A Vmax:        0.63 m/s  IVC Diam:         0.80 cm    Pulmonary Veins:  PulmV A Revs Dur: 137.01 msec      [id number] Jag Aguirre DO  Electronically signed on 11/25/2022 at 3:56:52 PM         Final     Stress Testing IMESULT([id number]):   NM CARDIAC STRESS REST (MYOCARDIAL PERFUSION MIBI) 04/08/2021    Narrative  MRN: [id number]  Patient Name: DEREK COLE    STUDY:  CARDIAC STRESS/REST INJECTION;  4/8/2021 9:51 am    INDICATION:  Chest pain/Abnormal DSE.    COMPARISON:  None.    ACCESSION NUMBER(S):  [id number]    ORDERING CLINICIAN:  JAG AGUIRRE    TECHNIQUE:  DIVISION OF NUCLEAR MEDICINE  PHARMACOLOGIC STRESS MYOCARDIAL PERFUSION SCAN, ONE DAY PROTOCOL    The patient received an intravenous dose of  10.6 mCi of Tc-99m  tetrofosmin and resting emission tomographic (SPECT) images of the  myocardium were acquired. The patient then received an intravenous  infusion of 0.4mg regadenoson (Lexiscan) followed by an additional  dose of  35.9 mCi of Tc-99m tetrofosmin. Stress phase SPECT images of  the myocardium were then acquired. These included ECG-gated images to  assess and quantify ventricular function.    FINDINGS:  Stress and rest images both demonstrate a normal distribution of  perfusion throughout all LV segments with no sign of ischemia.    ECG-gated images demonstrate normal LV size and myocardial  contractility with an LV ejection fraction of   84 % (normal above 50  percent).    Impression  1. Normal stress myocardial perfusion imaging in response to  pharmacologic stress.  2. Well-maintained left ventricular function.    Cardiac Catheterization: No results found for this or any previous visit from the past 1825 days.  No results found for this or any previous visit from the past 3650 days.     Cardiac Scoring: No results found for this or any previous visit from the past 1825 days.    AAA : No results found for this or any previous  visit from the past 1825 days.    OTHER: No results found for this or any previous visit from the past 1825 days.    LAST IMAGING RESULTS  ECG 12 Lead  Atrial fibrillation, anteroseptal infarct age undetermined, heart rate 80   bpm.      Problem List Items Addressed This Visit       Essential hypertension, benign    Hypothyroidism    Lightheadedness    Paroxysmal SVT (supraventricular tachycardia) (CMS-HCC)    Persistent atrial fibrillation (Multi)    (HFpEF) heart failure with preserved ejection fraction - Primary    Mitral valve regurgitation    Nonrheumatic tricuspid valve regurgitation         Jag Aguirre DO, FACC, FACOI

## 2025-02-05 ENCOUNTER — OFFICE VISIT (OUTPATIENT)
Dept: CARDIOLOGY | Facility: HOSPITAL | Age: OVER 89
End: 2025-02-05
Payer: COMMERCIAL

## 2025-02-05 VITALS
WEIGHT: 125 LBS | SYSTOLIC BLOOD PRESSURE: 132 MMHG | DIASTOLIC BLOOD PRESSURE: 72 MMHG | BODY MASS INDEX: 24.54 KG/M2 | HEIGHT: 60 IN | HEART RATE: 82 BPM

## 2025-02-05 DIAGNOSIS — I50.32 CHRONIC HEART FAILURE WITH PRESERVED EJECTION FRACTION: Primary | ICD-10-CM

## 2025-02-05 DIAGNOSIS — I48.91 ATRIAL FIBRILLATION, UNSPECIFIED TYPE (MULTI): ICD-10-CM

## 2025-02-05 DIAGNOSIS — E03.9 HYPOTHYROIDISM, UNSPECIFIED TYPE: ICD-10-CM

## 2025-02-05 DIAGNOSIS — I34.0 NONRHEUMATIC MITRAL VALVE REGURGITATION: ICD-10-CM

## 2025-02-05 DIAGNOSIS — I50.32 CHRONIC DIASTOLIC HEART FAILURE WITH PRESERVED EJECTION FRACTION: ICD-10-CM

## 2025-02-05 DIAGNOSIS — I10 ESSENTIAL HYPERTENSION, BENIGN: ICD-10-CM

## 2025-02-05 DIAGNOSIS — I47.10 PAROXYSMAL SVT (SUPRAVENTRICULAR TACHYCARDIA) (CMS-HCC): ICD-10-CM

## 2025-02-05 DIAGNOSIS — R42 LIGHTHEADEDNESS: ICD-10-CM

## 2025-02-05 DIAGNOSIS — I36.1 NONRHEUMATIC TRICUSPID VALVE REGURGITATION: ICD-10-CM

## 2025-02-05 DIAGNOSIS — I48.19 PERSISTENT ATRIAL FIBRILLATION (MULTI): ICD-10-CM

## 2025-02-05 DIAGNOSIS — I10 PRIMARY HYPERTENSION: ICD-10-CM

## 2025-02-05 LAB
Q ONSET: 231 MS
QRS COUNT: 14 BEATS
QRS DURATION: 64 MS
QT INTERVAL: 400 MS
QTC CALCULATION(BAZETT): 467 MS
QTC FREDERICIA: 444 MS
R AXIS: 102 DEGREES
T AXIS: -27 DEGREES
T OFFSET: 431 MS
VENTRICULAR RATE: 82 BPM

## 2025-02-05 PROCEDURE — 93005 ELECTROCARDIOGRAM TRACING: CPT | Performed by: INTERNAL MEDICINE

## 2025-02-05 PROCEDURE — 1036F TOBACCO NON-USER: CPT | Performed by: INTERNAL MEDICINE

## 2025-02-05 PROCEDURE — 93010 ELECTROCARDIOGRAM REPORT: CPT | Performed by: INTERNAL MEDICINE

## 2025-02-05 PROCEDURE — 3078F DIAST BP <80 MM HG: CPT | Performed by: INTERNAL MEDICINE

## 2025-02-05 PROCEDURE — 99214 OFFICE O/P EST MOD 30 MIN: CPT | Performed by: INTERNAL MEDICINE

## 2025-02-05 PROCEDURE — 1159F MED LIST DOCD IN RCRD: CPT | Performed by: INTERNAL MEDICINE

## 2025-02-05 PROCEDURE — 99214 OFFICE O/P EST MOD 30 MIN: CPT | Mod: 25 | Performed by: INTERNAL MEDICINE

## 2025-02-05 PROCEDURE — 3075F SYST BP GE 130 - 139MM HG: CPT | Performed by: INTERNAL MEDICINE

## 2025-02-05 RX ORDER — SPIRONOLACTONE 25 MG/1
12.5 TABLET ORAL DAILY
Qty: 45 TABLET | Refills: 3 | Status: SHIPPED | OUTPATIENT
Start: 2025-02-05 | End: 2026-02-05

## 2025-02-12 DIAGNOSIS — I50.32 CHRONIC DIASTOLIC HEART FAILURE WITH PRESERVED EJECTION FRACTION: ICD-10-CM

## 2025-02-12 DIAGNOSIS — I47.10 PAROXYSMAL SVT (SUPRAVENTRICULAR TACHYCARDIA) (CMS-HCC): ICD-10-CM

## 2025-02-12 DIAGNOSIS — E03.9 HYPOTHYROIDISM, UNSPECIFIED TYPE: ICD-10-CM

## 2025-02-12 DIAGNOSIS — I34.0 NONRHEUMATIC MITRAL VALVE REGURGITATION: ICD-10-CM

## 2025-02-12 DIAGNOSIS — R42 LIGHTHEADEDNESS: ICD-10-CM

## 2025-02-12 DIAGNOSIS — I48.91 ATRIAL FIBRILLATION, UNSPECIFIED TYPE (MULTI): ICD-10-CM

## 2025-02-12 DIAGNOSIS — I50.32 CHRONIC HEART FAILURE WITH PRESERVED EJECTION FRACTION: ICD-10-CM

## 2025-02-12 DIAGNOSIS — I48.19 PERSISTENT ATRIAL FIBRILLATION (MULTI): ICD-10-CM

## 2025-02-12 DIAGNOSIS — I10 ESSENTIAL HYPERTENSION, BENIGN: ICD-10-CM

## 2025-02-12 DIAGNOSIS — I36.1 NONRHEUMATIC TRICUSPID VALVE REGURGITATION: ICD-10-CM

## 2025-02-12 DIAGNOSIS — I10 PRIMARY HYPERTENSION: ICD-10-CM

## 2025-02-17 ENCOUNTER — APPOINTMENT (OUTPATIENT)
Dept: CARDIOLOGY | Facility: HOSPITAL | Age: OVER 89
End: 2025-02-17
Payer: COMMERCIAL

## 2025-02-24 DIAGNOSIS — E03.9 HYPOTHYROIDISM, UNSPECIFIED TYPE: ICD-10-CM

## 2025-02-24 RX ORDER — LEVOTHYROXINE SODIUM 50 UG/1
50 TABLET ORAL
Qty: 90 TABLET | Refills: 3 | Status: SHIPPED | OUTPATIENT
Start: 2025-02-24

## 2025-03-20 LAB
25(OH)D3+25(OH)D2 SERPL-MCNC: 57 NG/ML (ref 30–100)
ALBUMIN SERPL-MCNC: 4.3 G/DL (ref 3.6–5.1)
ALP SERPL-CCNC: 36 U/L (ref 37–153)
ALT SERPL-CCNC: 7 U/L (ref 6–29)
ANION GAP SERPL CALCULATED.4IONS-SCNC: 12 MMOL/L (CALC) (ref 7–17)
AST SERPL-CCNC: 15 U/L (ref 10–35)
BASOPHILS # BLD AUTO: 18 CELLS/UL (ref 0–200)
BASOPHILS NFR BLD AUTO: 0.3 %
BILIRUB SERPL-MCNC: 1 MG/DL (ref 0.2–1.2)
BUN SERPL-MCNC: 47 MG/DL (ref 7–25)
CALCIUM SERPL-MCNC: 9.5 MG/DL (ref 8.6–10.4)
CHLORIDE SERPL-SCNC: 98 MMOL/L (ref 98–110)
CHOLEST SERPL-MCNC: 183 MG/DL
CHOLEST/HDLC SERPL: 6.3 (CALC)
CO2 SERPL-SCNC: 28 MMOL/L (ref 20–32)
CREAT SERPL-MCNC: 1.43 MG/DL (ref 0.6–0.95)
EGFRCR SERPLBLD CKD-EPI 2021: 34 ML/MIN/1.73M2
EOSINOPHIL # BLD AUTO: 49 CELLS/UL (ref 15–500)
EOSINOPHIL NFR BLD AUTO: 0.8 %
ERYTHROCYTE [DISTWIDTH] IN BLOOD BY AUTOMATED COUNT: 13.2 % (ref 11–15)
EST. AVERAGE GLUCOSE BLD GHB EST-MCNC: 131 MG/DL
EST. AVERAGE GLUCOSE BLD GHB EST-SCNC: 7.3 MMOL/L
GLUCOSE SERPL-MCNC: 159 MG/DL (ref 65–99)
HBA1C MFR BLD: 6.2 % OF TOTAL HGB
HCT VFR BLD AUTO: 51.6 % (ref 35–45)
HDLC SERPL-MCNC: 29 MG/DL
HGB BLD-MCNC: 16.7 G/DL (ref 11.7–15.5)
LDLC SERPL CALC-MCNC: 118 MG/DL (CALC)
LYMPHOCYTES # BLD AUTO: 1086 CELLS/UL (ref 850–3900)
LYMPHOCYTES NFR BLD AUTO: 17.8 %
MCH RBC QN AUTO: 30.1 PG (ref 27–33)
MCHC RBC AUTO-ENTMCNC: 32.4 G/DL (ref 32–36)
MCV RBC AUTO: 93 FL (ref 80–100)
MONOCYTES # BLD AUTO: 183 CELLS/UL (ref 200–950)
MONOCYTES NFR BLD AUTO: 3 %
NEUTROPHILS # BLD AUTO: 4764 CELLS/UL (ref 1500–7800)
NEUTROPHILS NFR BLD AUTO: 78.1 %
NONHDLC SERPL-MCNC: 154 MG/DL (CALC)
PLATELET # BLD AUTO: 187 THOUSAND/UL (ref 140–400)
PMV BLD REES-ECKER: 9.8 FL (ref 7.5–12.5)
POTASSIUM SERPL-SCNC: 4.6 MMOL/L (ref 3.5–5.3)
PROT SERPL-MCNC: 6.7 G/DL (ref 6.1–8.1)
RBC # BLD AUTO: 5.55 MILLION/UL (ref 3.8–5.1)
SODIUM SERPL-SCNC: 138 MMOL/L (ref 135–146)
TRIGL SERPL-MCNC: 235 MG/DL
TSH SERPL-ACNC: 2.9 MIU/L (ref 0.4–4.5)
WBC # BLD AUTO: 6.1 THOUSAND/UL (ref 3.8–10.8)

## 2025-03-21 LAB
ALBUMIN/CREAT UR: 13 MG/G CREAT
CREAT UR-MCNC: 54 MG/DL (ref 20–275)
MICROALBUMIN UR-MCNC: 0.7 MG/DL

## 2025-03-26 ENCOUNTER — APPOINTMENT (OUTPATIENT)
Dept: PRIMARY CARE | Facility: CLINIC | Age: OVER 89
End: 2025-03-26
Payer: COMMERCIAL

## 2025-03-26 VITALS
WEIGHT: 109.4 LBS | DIASTOLIC BLOOD PRESSURE: 89 MMHG | TEMPERATURE: 97.4 F | OXYGEN SATURATION: 97 % | SYSTOLIC BLOOD PRESSURE: 144 MMHG | HEART RATE: 64 BPM | RESPIRATION RATE: 16 BRPM | BODY MASS INDEX: 21.37 KG/M2

## 2025-03-26 DIAGNOSIS — N18.30 STAGE 3 CHRONIC KIDNEY DISEASE, UNSPECIFIED WHETHER STAGE 3A OR 3B CKD (MULTI): ICD-10-CM

## 2025-03-26 DIAGNOSIS — I50.32 CHRONIC DIASTOLIC HEART FAILURE WITH PRESERVED EJECTION FRACTION: Primary | ICD-10-CM

## 2025-03-26 DIAGNOSIS — E78.5 HYPERLIPIDEMIA, UNSPECIFIED HYPERLIPIDEMIA TYPE: ICD-10-CM

## 2025-03-26 DIAGNOSIS — E03.9 ACQUIRED HYPOTHYROIDISM: ICD-10-CM

## 2025-03-26 DIAGNOSIS — M81.0 AGE-RELATED OSTEOPOROSIS WITHOUT CURRENT PATHOLOGICAL FRACTURE: ICD-10-CM

## 2025-03-26 DIAGNOSIS — E11.9 TYPE 2 DIABETES MELLITUS WITHOUT COMPLICATION, WITHOUT LONG-TERM CURRENT USE OF INSULIN: ICD-10-CM

## 2025-03-26 DIAGNOSIS — I10 ESSENTIAL HYPERTENSION, BENIGN: ICD-10-CM

## 2025-03-26 PROBLEM — J43.9 PULMONARY EMPHYSEMA, UNSPECIFIED EMPHYSEMA TYPE (MULTI): Status: RESOLVED | Noted: 2024-04-18 | Resolved: 2025-03-26

## 2025-03-26 PROCEDURE — 3079F DIAST BP 80-89 MM HG: CPT | Performed by: INTERNAL MEDICINE

## 2025-03-26 PROCEDURE — 1159F MED LIST DOCD IN RCRD: CPT | Performed by: INTERNAL MEDICINE

## 2025-03-26 PROCEDURE — 3077F SYST BP >= 140 MM HG: CPT | Performed by: INTERNAL MEDICINE

## 2025-03-26 PROCEDURE — 1036F TOBACCO NON-USER: CPT | Performed by: INTERNAL MEDICINE

## 2025-03-26 PROCEDURE — 99214 OFFICE O/P EST MOD 30 MIN: CPT | Performed by: INTERNAL MEDICINE

## 2025-03-26 SDOH — ECONOMIC STABILITY: FOOD INSECURITY: WITHIN THE PAST 12 MONTHS, YOU WORRIED THAT YOUR FOOD WOULD RUN OUT BEFORE YOU GOT MONEY TO BUY MORE.: NEVER TRUE

## 2025-03-26 SDOH — ECONOMIC STABILITY: FOOD INSECURITY: WITHIN THE PAST 12 MONTHS, THE FOOD YOU BOUGHT JUST DIDN'T LAST AND YOU DIDN'T HAVE MONEY TO GET MORE.: NEVER TRUE

## 2025-03-26 ASSESSMENT — LIFESTYLE VARIABLES
AUDIT-C TOTAL SCORE: 0
SKIP TO QUESTIONS 9-10: 1
HOW OFTEN DO YOU HAVE SIX OR MORE DRINKS ON ONE OCCASION: NEVER
HOW OFTEN DO YOU HAVE A DRINK CONTAINING ALCOHOL: NEVER
HOW MANY STANDARD DRINKS CONTAINING ALCOHOL DO YOU HAVE ON A TYPICAL DAY: PATIENT DOES NOT DRINK

## 2025-03-26 NOTE — ASSESSMENT & PLAN NOTE
Patient c/o lightheadedness, patient now takes furosemide and spironolactone, will stop the Jardiance now, she does appear to be dehydrated, recheck labs in 3 months

## 2025-03-26 NOTE — ASSESSMENT & PLAN NOTE
Patient feels lightheaded with Jardiance, stop the Jardiance, monitor labs, patient is 89 years old

## 2025-03-26 NOTE — PROGRESS NOTES
"Chief Complaint/HPI:  Main concern today is lightheadedness. Felt it when she was started on Jardiance  and it was increased after she was switched spironolactone.     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 No recent falls or head traumas. It occurred 2-3 years ago. Denies any confusion or change in vision. Patient does c/o lightheadedness.      pSVT: No recent complaints. She is seen by Cardiology, Dr. Aguirre.      HTN: She is currently on Carvedilol 25 mg, , Furosemide 40 mg. Due to K level low she had spironolactone added to regimen, but she stated she feels dizzy since she started the new medication. She stated she drinks a lot of water around 3 bottles a day.Patient has stopped amlodipine.    She continues to follow-up with Cardiology, Dr. Aguirre every 6 months. Echocardiogram done in May 2024 showed normal left ventricular systolic function with an ejection fraction of 60%, mildly reduced right ventricular systolic function, mild to moderate mitral valve regurgitation, moderate tricuspid valve regurgitation. Patient admits that she has felt \"terribly lightheaded\" , this is present \"all the time\". She  has recently started spironolactone, she is very dry, she is thirsty all the time, she states     Renal insufficiency: She was followed by Nephrology, Dr. Brewster. She has not seen nephrology for some time, she states she does not want to go to nephrology     Hypothyroidism: She is currently on Levothyroxine 50 mcg. She is aware of taking the medication on an empty stomach prior to breakfast.      HLD: Lipid panel performed, takes no meds  ,   Osteoporosis: She is on Prolia. No recent DEXA scans to review. Prolia requires renewal     Anxiety: Takes Paroxetine 20 mg.     Vitamin B-12/D deficiency: She no longer takes vitamin D. She no longer takes B12 she states     Hyperglycemia/DM Type II: patient takes Jardiance 10 mg. A1C 6.2. States the Jardiance is making her feel light headed.      ROS " otherwise negative aside from what was mentioned above in HPI.      Patient Active Problem List   Diagnosis    Abnormal stress test    Acute kidney injury (CMS-HCC)    Anxiety    Atypical chest pain    Benign paroxysmal positional vertigo    Bilateral impacted cerumen    Bone loss    Chest pain    Chronic diastolic heart failure with preserved ejection fraction    Chronic renal impairment, stage 2 (mild)    Dizziness    Essential hypertension, benign    Fixed dilated pupil of left eye    Frequent PVCs    Hyperlipidemia    Hypothyroidism    Lightheadedness    Orthostatic dizziness    Osteoporosis    PAC (premature atrial contraction)    Paroxysmal SVT (supraventricular tachycardia) (CMS-HCC)    Renal insufficiency    Syncope    Transient LOC (loss of consciousness)    Unsteady gait    Vertigo, benign positional    Vitamin B12 deficiency    Chronic pain    Binocular vision disorder with diplopia    Breast cyst    Hypertension    Coccyx pain    Iliotibial band syndrome of right side    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[facility])    Ptosis of eyelid, left    Spinal stenosis, lumbar region, without neurogenic claudication    Spondylosis without myelopathy or radiculopathy, lumbar region    Trochanteric bursitis of right hip    Other herpes zoster eye disease    Primary osteoarthritis of left knee    Persistent atrial fibrillation (Multi)    Benign paroxysmal positional vertigo of right ear    Hyperglycemia    Bilateral tinnitus    Sensorineural hearing loss (SNHL) of both ears    Intracranial hemorrhage (Multi)    Stage 3 chronic kidney disease, unspecified whether stage 3a or 3b CKD (Multi)    Pre-diabetes    (HFpEF) heart failure with preserved ejection fraction    Mitral valve regurgitation    Nonrheumatic tricuspid valve regurgitation    Type 2 diabetes mellitus         Past Medical History:   Diagnosis Date    Contact with and (suspected) exposure to covid-19 11/26/2022    Fall in home 11/26/2022    Comment on above: FALL AT HOME 11AM     Impacted cerumen 04/18/2024     Past Surgical History:   Procedure Laterality Date    OTHER SURGICAL HISTORY  12/10/2019    Hysterectomy    OTHER SURGICAL HISTORY  12/10/2019    Lumpectomy     Social History     Social History Narrative    Not on file         ALLERGIES  Lisinopril      MEDICATIONS  Current Outpatient Medications on File Prior to Visit   Medication Sig Dispense Refill    carvedilol (Coreg) 25 mg tablet TAKE 1 TABLET TWICE A  tablet 3    furosemide (Lasix) 40 mg tablet Take 1 tablet (40 mg) by mouth once daily. 90 tablet 3    levothyroxine (Synthroid, Levoxyl) 50 mcg tablet TAKE 1 TABLET DAILY IN THE MORNING BEFORE A MEAL 90 tablet 3    PARoxetine (Paxil) 20 mg tablet Take 1 tablet (20 mg) by mouth once daily. 90 tablet 3    spironolactone (Aldactone) 25 mg tablet Take 0.5 tablets (12.5 mg) by mouth once daily. 45 tablet 3    [DISCONTINUED] Jardiance 10 mg TAKE 1 TABLET DAILY 90 tablet 3    denosumab (Prolia) 60 mg/mL syringe Inject 1 mL (60 mg total) under the skin 1 time for 1 dose. (Patient not taking: Reported on 2/5/2025) 1 mL 1     No current facility-administered medications on file prior to visit.         PHYSICAL EXAM  /89 (BP Location: Right arm, Patient Position: Sitting, BP Cuff Size: Adult)   Pulse 64   Temp 36.3 °C (97.4 °F) (Temporal)   Resp 16   Wt 49.6 kg (109 lb 6.4 oz)   SpO2 97%   BMI 21.37 kg/m²   Body mass index is 21.37 kg/m².  Constitutional:       Appearance: Normal appearance. She is normal weight. She is not ill-appearing. Accompanied by daughter, sitting in wheelchair today  HENT:      Head: Normocephalic and atraumatic. No neck masses or thyromegaly     Right Ear: External ear normal.      Left Ear: External ear normal.      Eyes:      Extraocular Movements: Extraocular movements intact.      Conjunctiva/sclera: Conjunctivae normal.   Cardiovascular:      Rate and Rhythm: regular rate present. Rhythm irregular.      Pulses: Normal pulses.      Heart  sounds: Normal heart sounds. No carotid bruits noted     Comments: Irregular rate and rhythm today noted, No edema is present, LE s are extremely dry  Pulmonary:      Effort: Pulmonary effort is normal.      Breath sounds: No wheezes, no rhonchi, diminished breath sounds in the bases bilaterally   Chest:      Chest wall: No tenderness.   Musculoskeletal:         General: Normal range of motion.      Cervical back: Normal range of motion and neck supple. No tenderness.     Significant no edema noted today   Lymphadenopathy:      Cervical: No cervical adenopathy.   Skin:     General: Skin is warm and dry.      Findings: skin on legs is crusted and thickened, a large keratosis is peeled away from the right leg, about 2 cm in diameter  Neurological:      General: No focal deficit present. But generalize mild weakness of the Upper and Lower extremity.     Mental Status: She is alert and oriented to person, place, and time.      Motor: No weakness.      Gait: patient is sitting in wheelchair      Deep Tendon Reflexes: Reflexes normal.   Psychiatric:         Mood and Affect: Mood normal.         Behavior: Behavior normal.         Thought Content: Thought content normal.         Judgment: Judgment normal.     ASSESSMENT/PLAN  Problem List Items Addressed This Visit       Chronic diastolic heart failure with preserved ejection fraction - Primary    Current Assessment & Plan     Patient c/o lightheadedness, patient now takes furosemide and spironolactone, will stop the Jardiance now, she does appear to be dehydrated, recheck labs in 3 months         Relevant Orders    CBC and Auto Differential    Comprehensive Metabolic Panel    Essential hypertension, benign    Current Assessment & Plan     BP is stable, will stop the Jardiance         Relevant Orders    CBC and Auto Differential    Comprehensive Metabolic Panel    Hyperlipidemia    Relevant Orders    Lipid Panel    Hypothyroidism    Current Assessment & Plan     Takes  levothyroxine, monitor labs         Relevant Orders    CBC and Auto Differential    Comprehensive Metabolic Panel    TSH with reflex to Free T4 if abnormal    Osteoporosis    Relevant Orders    Vitamin D 25-Hydroxy,Total (for eval of Vitamin D levels)    Stage 3 chronic kidney disease, unspecified whether stage 3a or 3b CKD (Multi)    Current Assessment & Plan     Patient appears to be dehydrated, stop the Jardiance, monitor labs         Relevant Orders    Albumin-Creatinine Ratio, Urine Random    CBC and Auto Differential    Comprehensive Metabolic Panel    Type 2 diabetes mellitus    Current Assessment & Plan     Patient feels lightheaded with Jardiance, stop the Jardiance, monitor labs, patient is 89 years old          Relevant Orders    Albumin-Creatinine Ratio, Urine Random    CBC and Auto Differential    Comprehensive Metabolic Panel    Hemoglobin A1C     Recheck labs in 3 months, follow up in 4 months    Patient was staffed with Dr. Natanael Murphy [facility]

## 2025-04-02 ENCOUNTER — TELEPHONE (OUTPATIENT)
Dept: CARDIOLOGY | Facility: HOSPITAL | Age: OVER 89
End: 2025-04-02
Payer: COMMERCIAL

## 2025-04-02 DIAGNOSIS — I36.1 NONRHEUMATIC TRICUSPID VALVE REGURGITATION: ICD-10-CM

## 2025-04-02 DIAGNOSIS — I10 PRIMARY HYPERTENSION: ICD-10-CM

## 2025-04-02 DIAGNOSIS — I34.0 NONRHEUMATIC MITRAL VALVE REGURGITATION: ICD-10-CM

## 2025-04-02 DIAGNOSIS — E03.9 HYPOTHYROIDISM, UNSPECIFIED TYPE: ICD-10-CM

## 2025-04-02 DIAGNOSIS — I50.32 CHRONIC HEART FAILURE WITH PRESERVED EJECTION FRACTION: ICD-10-CM

## 2025-04-02 DIAGNOSIS — I47.10 PAROXYSMAL SVT (SUPRAVENTRICULAR TACHYCARDIA) (CMS-HCC): ICD-10-CM

## 2025-04-02 DIAGNOSIS — I48.91 ATRIAL FIBRILLATION, UNSPECIFIED TYPE (MULTI): ICD-10-CM

## 2025-04-02 DIAGNOSIS — I10 ESSENTIAL HYPERTENSION, BENIGN: ICD-10-CM

## 2025-04-02 DIAGNOSIS — I48.19 PERSISTENT ATRIAL FIBRILLATION (MULTI): ICD-10-CM

## 2025-04-02 DIAGNOSIS — R42 LIGHTHEADEDNESS: ICD-10-CM

## 2025-04-02 DIAGNOSIS — I50.32 CHRONIC DIASTOLIC HEART FAILURE WITH PRESERVED EJECTION FRACTION: ICD-10-CM

## 2025-04-02 RX ORDER — SPIRONOLACTONE 25 MG/1
12.5 TABLET ORAL DAILY
Qty: 45 TABLET | Refills: 1 | Status: SHIPPED | OUTPATIENT
Start: 2025-04-02 | End: 2026-04-02

## 2025-04-02 NOTE — TELEPHONE ENCOUNTER
4/2/25  1328  Attempted to call patient to inform spironolactone does not come in a 12.5 mg tablet; no answer and no voice mail available.    Will sent Rx for approval.

## 2025-04-02 NOTE — TELEPHONE ENCOUNTER
PT called stating that she needs a refill on medication - pt would like to have the pill in a whole and not have to split if possible.       Pharmacy : [facility]     Medication : spironolactone        Routing to nurse

## 2025-04-02 NOTE — TELEPHONE ENCOUNTER
Patient called office to return KAILEY Devlin call. Relayed message in his last note. Patient verbalized understanding.

## 2025-04-16 ENCOUNTER — HOSPITAL ENCOUNTER (INPATIENT)
Facility: HOSPITAL | Age: OVER 89
DRG: 542 | End: 2025-04-16
Attending: STUDENT IN AN ORGANIZED HEALTH CARE EDUCATION/TRAINING PROGRAM | Admitting: STUDENT IN AN ORGANIZED HEALTH CARE EDUCATION/TRAINING PROGRAM
Payer: COMMERCIAL

## 2025-04-16 DIAGNOSIS — R42 LIGHTHEADEDNESS: ICD-10-CM

## 2025-04-16 DIAGNOSIS — R06.00 DYSPNEA, UNSPECIFIED: ICD-10-CM
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
## 2025-04-16 DIAGNOSIS — M54.9 ACUTE MIDLINE BACK PAIN, UNSPECIFIED BACK LOCATION: ICD-10-CM

## 2025-04-16 DIAGNOSIS — R79.89 OTHER SPECIFIED ABNORMAL FINDINGS OF BLOOD CHEMISTRY: ICD-10-CM

## 2025-04-16 DIAGNOSIS — I24.89 DEMAND ISCHEMIA (MULTI): ICD-10-CM

## 2025-04-16 DIAGNOSIS — N17.9 ACUTE KIDNEY INJURY: ICD-10-CM

## 2025-04-16 DIAGNOSIS — R06.09 DOE (DYSPNEA ON EXERTION): ICD-10-CM

## 2025-04-16 LAB
ALBUMIN SERPL BCP-MCNC: 4.3 G/DL (ref 3.4–5)
ALP SERPL-CCNC: 38 U/L (ref 33–136)
ALT SERPL W P-5'-P-CCNC: 9 U/L (ref 7–45)
ANION GAP BLDV CALCULATED.4IONS-SCNC: 9 MMOL/L (ref 10–25)
ANION GAP SERPL CALC-SCNC: 15 MMOL/L (ref 10–20)
AST SERPL W P-5'-P-CCNC: 20 U/L (ref 9–39)
BASE EXCESS BLDV CALC-SCNC: 4.6 MMOL/L (ref -2–3)
BASOPHILS # BLD AUTO: 0.02 X10*3/UL (ref 0–0.1)
BASOPHILS NFR BLD AUTO: 0.2 %
BILIRUB SERPL-MCNC: 1.1 MG/DL (ref 0–1.2)
BNP SERPL-MCNC: 201 PG/ML (ref 0–99)
BODY TEMPERATURE: 37 DEGREES CELSIUS
BUN SERPL-MCNC: 55 MG/DL (ref 6–23)
CA-I BLDV-SCNC: 1.23 MMOL/L (ref 1.1–1.33)
CALCIUM SERPL-MCNC: 9.9 MG/DL (ref 8.6–10.3)
CARDIAC TROPONIN I PNL SERPL HS: 58 NG/L (ref 0–13)
CARDIAC TROPONIN I PNL SERPL HS: 65 NG/L (ref 0–13)
CHLORIDE BLDV-SCNC: 96 MMOL/L (ref 98–107)
CHLORIDE SERPL-SCNC: 97 MMOL/L (ref 98–107)
CO2 SERPL-SCNC: 28 MMOL/L (ref 21–32)
CREAT SERPL-MCNC: 1.55 MG/DL (ref 0.5–1.05)
EGFRCR SERPLBLD CKD-EPI 2021: 31 ML/MIN/1.73M*2
EOSINOPHIL # BLD AUTO: 0.02 X10*3/UL (ref 0–0.4)
EOSINOPHIL NFR BLD AUTO: 0.2 %
ERYTHROCYTE [DISTWIDTH] IN BLOOD BY AUTOMATED COUNT: 13.2 % (ref 11.5–14.5)
GLUCOSE BLDV-MCNC: 131 MG/DL (ref 74–99)
GLUCOSE SERPL-MCNC: 124 MG/DL (ref 74–99)
HCO3 BLDV-SCNC: 29.9 MMOL/L (ref 22–26)
HCT VFR BLD AUTO: 48.8 % (ref 36–46)
HCT VFR BLD EST: 51 % (ref 36–46)
HGB BLD-MCNC: 16.2 G/DL (ref 12–16)
HGB BLDV-MCNC: 16.9 G/DL (ref 12–16)
IMM GRANULOCYTES # BLD AUTO: 0.02 X10*3/UL (ref 0–0.5)
IMM GRANULOCYTES NFR BLD AUTO: 0.2 % (ref 0–0.9)
INHALED O2 CONCENTRATION: 21 %
LACTATE BLDV-SCNC: 1.5 MMOL/L (ref 0.4–2)
LACTATE BLDV-SCNC: 2.1 MMOL/L (ref 0.4–2)
LYMPHOCYTES # BLD AUTO: 1.24 X10*3/UL (ref 0.8–3)
LYMPHOCYTES NFR BLD AUTO: 12.4 %
MAGNESIUM SERPL-MCNC: 2.02 MG/DL (ref 1.6–2.4)
MCH RBC QN AUTO: 29.3 PG (ref 26–34)
MCHC RBC AUTO-ENTMCNC: 33.2 G/DL (ref 32–36)
MCV RBC AUTO: 88 FL (ref 80–100)
MONOCYTES # BLD AUTO: 0.27 X10*3/UL (ref 0.05–0.8)
MONOCYTES NFR BLD AUTO: 2.7 %
NEUTROPHILS # BLD AUTO: 8.45 X10*3/UL (ref 1.6–5.5)
NEUTROPHILS NFR BLD AUTO: 84.3 %
NRBC BLD-RTO: 0 /100 WBCS (ref 0–0)
OXYHGB MFR BLDV: 71.3 % (ref 45–75)
PCO2 BLDV: 45 MM HG (ref 41–51)
PH BLDV: 7.43 PH (ref 7.33–7.43)
PLATELET # BLD AUTO: 157 X10*3/UL (ref 150–450)
PO2 BLDV: 44 MM HG (ref 35–45)
POTASSIUM BLDV-SCNC: 3.9 MMOL/L (ref 3.5–5.3)
POTASSIUM SERPL-SCNC: 4 MMOL/L (ref 3.5–5.3)
PROT SERPL-MCNC: 7.2 G/DL (ref 6.4–8.2)
RBC # BLD AUTO: 5.53 X10*6/UL (ref 4–5.2)
SAO2 % BLDV: 73 % (ref 45–75)
SODIUM BLDV-SCNC: 131 MMOL/L (ref 136–145)
SODIUM SERPL-SCNC: 136 MMOL/L (ref 136–145)
WBC # BLD AUTO: 10 X10*3/UL (ref 4.4–11.3)

## 2025-04-16 PROCEDURE — 96361 HYDRATE IV INFUSION ADD-ON: CPT

## 2025-04-16 PROCEDURE — 83605 ASSAY OF LACTIC ACID: CPT | Performed by: STUDENT IN AN ORGANIZED HEALTH CARE EDUCATION/TRAINING PROGRAM

## 2025-04-16 PROCEDURE — 83735 ASSAY OF MAGNESIUM: CPT | Performed by: STUDENT IN AN ORGANIZED HEALTH CARE EDUCATION/TRAINING PROGRAM

## 2025-04-16 PROCEDURE — 72131 CT LUMBAR SPINE W/O DYE: CPT | Performed by: STUDENT IN AN ORGANIZED HEALTH CARE EDUCATION/TRAINING PROGRAM

## 2025-04-16 PROCEDURE — 72128 CT CHEST SPINE W/O DYE: CPT | Performed by: STUDENT IN AN ORGANIZED HEALTH CARE EDUCATION/TRAINING PROGRAM

## 2025-04-16 PROCEDURE — 85025 COMPLETE CBC W/AUTO DIFF WBC: CPT | Performed by: STUDENT IN AN ORGANIZED HEALTH CARE EDUCATION/TRAINING PROGRAM

## 2025-04-16 PROCEDURE — 84484 ASSAY OF TROPONIN QUANT: CPT | Performed by: STUDENT IN AN ORGANIZED HEALTH CARE EDUCATION/TRAINING PROGRAM

## 2025-04-16 PROCEDURE — 36415 COLL VENOUS BLD VENIPUNCTURE: CPT | Performed by: STUDENT IN AN ORGANIZED HEALTH CARE EDUCATION/TRAINING PROGRAM

## 2025-04-16 PROCEDURE — 83880 ASSAY OF NATRIURETIC PEPTIDE: CPT | Performed by: STUDENT IN AN ORGANIZED HEALTH CARE EDUCATION/TRAINING PROGRAM

## 2025-04-16 PROCEDURE — 84132 ASSAY OF SERUM POTASSIUM: CPT | Performed by: STUDENT IN AN ORGANIZED HEALTH CARE EDUCATION/TRAINING PROGRAM

## 2025-04-16 PROCEDURE — 70450 CT HEAD/BRAIN W/O DYE: CPT | Performed by: STUDENT IN AN ORGANIZED HEALTH CARE EDUCATION/TRAINING PROGRAM

## 2025-04-16 PROCEDURE — 2500000004 HC RX 250 GENERAL PHARMACY W/ HCPCS (ALT 636 FOR OP/ED): Mod: JZ | Performed by: STUDENT IN AN ORGANIZED HEALTH CARE EDUCATION/TRAINING PROGRAM

## 2025-04-16 PROCEDURE — 99285 EMERGENCY DEPT VISIT HI MDM: CPT | Mod: 25 | Performed by: STUDENT IN AN ORGANIZED HEALTH CARE EDUCATION/TRAINING PROGRAM

## 2025-04-16 PROCEDURE — 72125 CT NECK SPINE W/O DYE: CPT | Performed by: STUDENT IN AN ORGANIZED HEALTH CARE EDUCATION/TRAINING PROGRAM

## 2025-04-16 PROCEDURE — 96360 HYDRATION IV INFUSION INIT: CPT

## 2025-04-16 PROCEDURE — 2500000001 HC RX 250 WO HCPCS SELF ADMINISTERED DRUGS (ALT 637 FOR MEDICARE OP): Performed by: STUDENT IN AN ORGANIZED HEALTH CARE EDUCATION/TRAINING PROGRAM

## 2025-04-16 PROCEDURE — 82550 ASSAY OF CK (CPK): CPT | Performed by: STUDENT IN AN ORGANIZED HEALTH CARE EDUCATION/TRAINING PROGRAM

## 2025-04-16 PROCEDURE — 72192 CT PELVIS W/O DYE: CPT | Performed by: STUDENT IN AN ORGANIZED HEALTH CARE EDUCATION/TRAINING PROGRAM

## 2025-04-16 RX ORDER — ACETAMINOPHEN 325 MG/1
975 TABLET ORAL ONCE
Status: COMPLETED | OUTPATIENT
Start: 2025-04-16 | End: 2025-04-16

## 2025-04-16 RX ADMIN — SODIUM CHLORIDE 500 ML: 0.9 INJECTION, SOLUTION INTRAVENOUS at 21:11

## 2025-04-16 RX ADMIN — ACETAMINOPHEN 975 MG: 325 TABLET ORAL at 21:16

## 2025-04-16 ASSESSMENT — LIFESTYLE VARIABLES
TOTAL SCORE: 0
EVER FELT BAD OR GUILTY ABOUT YOUR DRINKING: NO
HAVE PEOPLE ANNOYED YOU BY CRITICIZING YOUR DRINKING: NO
EVER HAD A DRINK FIRST THING IN THE MORNING TO STEADY YOUR NERVES TO GET RID OF A HANGOVER: NO
HAVE YOU EVER FELT YOU SHOULD CUT DOWN ON YOUR DRINKING: NO

## 2025-04-16 ASSESSMENT — PAIN SCALES - GENERAL
PAINLEVEL_OUTOF10: 4
PAINLEVEL_OUTOF10: 6
PAINLEVEL_OUTOF10: 7
PAINLEVEL_OUTOF10: 7

## 2025-04-16 ASSESSMENT — COLUMBIA-SUICIDE SEVERITY RATING SCALE - C-SSRS
1. IN THE PAST MONTH, HAVE YOU WISHED YOU WERE DEAD OR WISHED YOU COULD GO TO SLEEP AND NOT WAKE UP?: NO
2. HAVE YOU ACTUALLY HAD ANY THOUGHTS OF KILLING YOURSELF?: NO
6. HAVE YOU EVER DONE ANYTHING, STARTED TO DO ANYTHING, OR PREPARED TO DO ANYTHING TO END YOUR LIFE?: NO

## 2025-04-16 ASSESSMENT — PAIN - FUNCTIONAL ASSESSMENT: PAIN_FUNCTIONAL_ASSESSMENT: 0-10

## 2025-04-16 ASSESSMENT — PAIN DESCRIPTION - PAIN TYPE: TYPE: ACUTE PAIN

## 2025-04-16 ASSESSMENT — PAIN DESCRIPTION - LOCATION: LOCATION: BACK

## 2025-04-17 PROBLEM — R79.89 ELEVATED TROPONIN: Status: ACTIVE | Noted: 2025-01-01
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
## 2025-04-17 LAB
ANION GAP SERPL CALC-SCNC: 14 MMOL/L (ref 10–20)
APPEARANCE UR: CLEAR
ATRIAL RATE: 108 BPM
BILIRUB UR STRIP.AUTO-MCNC: NEGATIVE MG/DL
BUN SERPL-MCNC: 50 MG/DL (ref 6–23)
CALCIUM SERPL-MCNC: 7.9 MG/DL (ref 8.6–10.3)
CARDIAC TROPONIN I PNL SERPL HS: 66 NG/L (ref 0–13)
CHLORIDE SERPL-SCNC: 102 MMOL/L (ref 98–107)
CK SERPL-CCNC: 201 U/L (ref 0–215)
CO2 SERPL-SCNC: 27 MMOL/L (ref 21–32)
COLOR UR: NORMAL
CREAT SERPL-MCNC: 1.48 MG/DL (ref 0.5–1.05)
EGFRCR SERPLBLD CKD-EPI 2021: 32 ML/MIN/1.73M*2
EJECTION FRACTION APICAL 4 CHAMBER: 61.8
EJECTION FRACTION: 66 %
ERYTHROCYTE [DISTWIDTH] IN BLOOD BY AUTOMATED COUNT: 13.5 % (ref 11.5–14.5)
GLUCOSE BLD MANUAL STRIP-MCNC: 108 MG/DL (ref 74–99)
GLUCOSE BLD MANUAL STRIP-MCNC: 111 MG/DL (ref 74–99)
GLUCOSE BLD MANUAL STRIP-MCNC: 113 MG/DL (ref 74–99)
GLUCOSE BLD MANUAL STRIP-MCNC: 142 MG/DL (ref 74–99)
GLUCOSE SERPL-MCNC: 113 MG/DL (ref 74–99)
GLUCOSE UR STRIP.AUTO-MCNC: NORMAL MG/DL
HCT VFR BLD AUTO: 41.5 % (ref 36–46)
HGB BLD-MCNC: 13.7 G/DL (ref 12–16)
HOLD SPECIMEN: NORMAL
KETONES UR STRIP.AUTO-MCNC: NEGATIVE MG/DL
LEUKOCYTE ESTERASE UR QL STRIP.AUTO: NEGATIVE
LV EJECTION FRACTION BIPLANE: 66 %
MCH RBC QN AUTO: 29.8 PG (ref 26–34)
MCHC RBC AUTO-ENTMCNC: 33 G/DL (ref 32–36)
MCV RBC AUTO: 90 FL (ref 80–100)
MITRAL VALVE E/A RATIO: 3.28
NITRITE UR QL STRIP.AUTO: NEGATIVE
NRBC BLD-RTO: 0 /100 WBCS (ref 0–0)
PH UR STRIP.AUTO: 6 [PH]
PLATELET # BLD AUTO: 131 X10*3/UL (ref 150–450)
POTASSIUM SERPL-SCNC: 4.6 MMOL/L (ref 3.5–5.3)
PR INTERVAL: 58 MS
PROT UR STRIP.AUTO-MCNC: NEGATIVE MG/DL
Q ONSET: 252 MS
QRS COUNT: 14 BEATS
QRS DURATION: 83 MS
QT INTERVAL: 395 MS
QTC CALCULATION(BAZETT): 481 MS
QTC FREDERICIA: 450 MS
R AXIS: 50 DEGREES
RBC # BLD AUTO: 4.6 X10*6/UL (ref 4–5.2)
RBC # UR STRIP.AUTO: NEGATIVE MG/DL
RIGHT VENTRICLE FREE WALL PEAK S': 8.7 CM/S
RIGHT VENTRICLE PEAK SYSTOLIC PRESSURE: 38.3 MMHG
SODIUM SERPL-SCNC: 138 MMOL/L (ref 136–145)
SP GR UR STRIP.AUTO: 1.01
T AXIS: 45 DEGREES
T OFFSET: 450 MS
TRICUSPID ANNULAR PLANE SYSTOLIC EXCURSION: 1.3 CM
UROBILINOGEN UR STRIP.AUTO-MCNC: NORMAL MG/DL
VENTRICULAR RATE: 89 BPM
WBC # BLD AUTO: 5.5 X10*3/UL (ref 4.4–11.3)

## 2025-04-17 PROCEDURE — 82947 ASSAY GLUCOSE BLOOD QUANT: CPT

## 2025-04-17 PROCEDURE — 97166 OT EVAL MOD COMPLEX 45 MIN: CPT | Mod: GO

## 2025-04-17 PROCEDURE — 84484 ASSAY OF TROPONIN QUANT: CPT | Performed by: STUDENT IN AN ORGANIZED HEALTH CARE EDUCATION/TRAINING PROGRAM

## 2025-04-17 PROCEDURE — 51702 INSERT TEMP BLADDER CATH: CPT

## 2025-04-17 PROCEDURE — 2500000004 HC RX 250 GENERAL PHARMACY W/ HCPCS (ALT 636 FOR OP/ED): Mod: JZ | Performed by: STUDENT IN AN ORGANIZED HEALTH CARE EDUCATION/TRAINING PROGRAM

## 2025-04-17 PROCEDURE — 80048 BASIC METABOLIC PNL TOTAL CA: CPT | Performed by: STUDENT IN AN ORGANIZED HEALTH CARE EDUCATION/TRAINING PROGRAM

## 2025-04-17 PROCEDURE — 96372 THER/PROPH/DIAG INJ SC/IM: CPT | Performed by: STUDENT IN AN ORGANIZED HEALTH CARE EDUCATION/TRAINING PROGRAM

## 2025-04-17 PROCEDURE — 2500000005 HC RX 250 GENERAL PHARMACY W/O HCPCS: Performed by: STUDENT IN AN ORGANIZED HEALTH CARE EDUCATION/TRAINING PROGRAM

## 2025-04-17 PROCEDURE — 1200000002 HC GENERAL ROOM WITH TELEMETRY DAILY

## 2025-04-17 PROCEDURE — 2500000001 HC RX 250 WO HCPCS SELF ADMINISTERED DRUGS (ALT 637 FOR MEDICARE OP): Performed by: STUDENT IN AN ORGANIZED HEALTH CARE EDUCATION/TRAINING PROGRAM

## 2025-04-17 PROCEDURE — 81003 URINALYSIS AUTO W/O SCOPE: CPT | Performed by: STUDENT IN AN ORGANIZED HEALTH CARE EDUCATION/TRAINING PROGRAM

## 2025-04-17 PROCEDURE — 2500000001 HC RX 250 WO HCPCS SELF ADMINISTERED DRUGS (ALT 637 FOR MEDICARE OP): Performed by: REGISTERED NURSE

## 2025-04-17 PROCEDURE — 96361 HYDRATE IV INFUSION ADD-ON: CPT | Mod: 59

## 2025-04-17 PROCEDURE — 99223 1ST HOSP IP/OBS HIGH 75: CPT | Performed by: STUDENT IN AN ORGANIZED HEALTH CARE EDUCATION/TRAINING PROGRAM

## 2025-04-17 PROCEDURE — 2500000002 HC RX 250 W HCPCS SELF ADMINISTERED DRUGS (ALT 637 FOR MEDICARE OP, ALT 636 FOR OP/ED): Performed by: STUDENT IN AN ORGANIZED HEALTH CARE EDUCATION/TRAINING PROGRAM

## 2025-04-17 PROCEDURE — 36415 COLL VENOUS BLD VENIPUNCTURE: CPT | Performed by: STUDENT IN AN ORGANIZED HEALTH CARE EDUCATION/TRAINING PROGRAM

## 2025-04-17 PROCEDURE — 2500000004 HC RX 250 GENERAL PHARMACY W/ HCPCS (ALT 636 FOR OP/ED): Performed by: STUDENT IN AN ORGANIZED HEALTH CARE EDUCATION/TRAINING PROGRAM

## 2025-04-17 PROCEDURE — 85027 COMPLETE CBC AUTOMATED: CPT | Performed by: STUDENT IN AN ORGANIZED HEALTH CARE EDUCATION/TRAINING PROGRAM

## 2025-04-17 PROCEDURE — 97162 PT EVAL MOD COMPLEX 30 MIN: CPT | Mod: GP

## 2025-04-17 RX ORDER — TALC
3 POWDER (GRAM) TOPICAL NIGHTLY PRN
Status: DISCONTINUED | OUTPATIENT
Start: 2025-04-17 | End: 2025-04-22 | Stop reason: HOSPADM

## 2025-04-17 RX ORDER — BISACODYL 5 MG
10 TABLET, DELAYED RELEASE (ENTERIC COATED) ORAL DAILY PRN
Status: DISCONTINUED | OUTPATIENT
Start: 2025-04-17 | End: 2025-04-22 | Stop reason: HOSPADM

## 2025-04-17 RX ORDER — PANTOPRAZOLE SODIUM 40 MG/10ML
40 INJECTION, POWDER, LYOPHILIZED, FOR SOLUTION INTRAVENOUS DAILY
Status: DISCONTINUED | OUTPATIENT
Start: 2025-04-17 | End: 2025-04-21

## 2025-04-17 RX ORDER — DEXTROSE 50 % IN WATER (D50W) INTRAVENOUS SYRINGE
12.5
Status: DISCONTINUED | OUTPATIENT
Start: 2025-04-17 | End: 2025-04-21

## 2025-04-17 RX ORDER — LEVOTHYROXINE SODIUM 50 UG/1
50 TABLET ORAL DAILY
Status: DISCONTINUED | OUTPATIENT
Start: 2025-04-17 | End: 2025-04-21

## 2025-04-17 RX ORDER — ACETAMINOPHEN 325 MG/1
650 TABLET ORAL EVERY 6 HOURS PRN
Status: DISCONTINUED | OUTPATIENT
Start: 2025-04-17 | End: 2025-04-22 | Stop reason: HOSPADM

## 2025-04-17 RX ORDER — PANTOPRAZOLE SODIUM 40 MG/1
40 TABLET, DELAYED RELEASE ORAL DAILY
Status: DISCONTINUED | OUTPATIENT
Start: 2025-04-17 | End: 2025-04-21

## 2025-04-17 RX ORDER — PAROXETINE HYDROCHLORIDE 20 MG/1
20 TABLET, FILM COATED ORAL DAILY
Status: DISCONTINUED | OUTPATIENT
Start: 2025-04-17 | End: 2025-04-18

## 2025-04-17 RX ORDER — DEXTROSE 50 % IN WATER (D50W) INTRAVENOUS SYRINGE
25
Status: DISCONTINUED | OUTPATIENT
Start: 2025-04-17 | End: 2025-04-21

## 2025-04-17 RX ORDER — ONDANSETRON 4 MG/1
4 TABLET, FILM COATED ORAL EVERY 8 HOURS PRN
Status: DISCONTINUED | OUTPATIENT
Start: 2025-04-17 | End: 2025-04-22 | Stop reason: HOSPADM

## 2025-04-17 RX ORDER — HYDROCODONE BITARTRATE AND ACETAMINOPHEN 5; 325 MG/1; MG/1
1 TABLET ORAL EVERY 6 HOURS PRN
Refills: 0 | Status: DISCONTINUED | OUTPATIENT
Start: 2025-04-17 | End: 2025-04-22 | Stop reason: HOSPADM

## 2025-04-17 RX ORDER — SPIRONOLACTONE 25 MG/1
12.5 TABLET ORAL DAILY
Status: DISCONTINUED | OUTPATIENT
Start: 2025-04-17 | End: 2025-04-21

## 2025-04-17 RX ORDER — INSULIN LISPRO 100 [IU]/ML
0-5 INJECTION, SOLUTION INTRAVENOUS; SUBCUTANEOUS
Status: DISCONTINUED | OUTPATIENT
Start: 2025-04-17 | End: 2025-04-21

## 2025-04-17 RX ORDER — CARVEDILOL 25 MG/1
25 TABLET ORAL 2 TIMES DAILY
Status: DISCONTINUED | OUTPATIENT
Start: 2025-04-17 | End: 2025-04-21

## 2025-04-17 RX ORDER — SODIUM CHLORIDE 9 MG/ML
75 INJECTION, SOLUTION INTRAVENOUS CONTINUOUS
Status: ACTIVE | OUTPATIENT
Start: 2025-04-17 | End: 2025-04-17

## 2025-04-17 RX ORDER — HEPARIN SODIUM 5000 [USP'U]/ML
5000 INJECTION, SOLUTION INTRAVENOUS; SUBCUTANEOUS EVERY 8 HOURS SCHEDULED
Status: DISCONTINUED | OUTPATIENT
Start: 2025-04-17 | End: 2025-04-21

## 2025-04-17 RX ORDER — GUAIFENESIN 600 MG/1
600 TABLET, EXTENDED RELEASE ORAL EVERY 12 HOURS PRN
Status: DISCONTINUED | OUTPATIENT
Start: 2025-04-17 | End: 2025-04-22 | Stop reason: HOSPADM

## 2025-04-17 RX ORDER — NAPROXEN SODIUM 220 MG/1
324 TABLET, FILM COATED ORAL ONCE
Status: COMPLETED | OUTPATIENT
Start: 2025-04-17 | End: 2025-04-17

## 2025-04-17 RX ORDER — ONDANSETRON HYDROCHLORIDE 2 MG/ML
4 INJECTION, SOLUTION INTRAVENOUS EVERY 8 HOURS PRN
Status: DISCONTINUED | OUTPATIENT
Start: 2025-04-17 | End: 2025-04-22 | Stop reason: HOSPADM

## 2025-04-17 RX ADMIN — SODIUM CHLORIDE 500 ML: 0.9 INJECTION, SOLUTION INTRAVENOUS at 00:34

## 2025-04-17 RX ADMIN — SPIRONOLACTONE 12.5 MG: 25 TABLET ORAL at 08:20

## 2025-04-17 RX ADMIN — ASPIRIN 81 MG CHEWABLE TABLET 324 MG: 81 TABLET CHEWABLE at 02:30

## 2025-04-17 RX ADMIN — SODIUM CHLORIDE 75 ML/HR: 9 INJECTION, SOLUTION INTRAVENOUS at 02:30

## 2025-04-17 RX ADMIN — HEPARIN SODIUM 5000 UNITS: 5000 INJECTION, SOLUTION INTRAVENOUS; SUBCUTANEOUS at 15:48

## 2025-04-17 RX ADMIN — CARVEDILOL 25 MG: 25 TABLET, FILM COATED ORAL at 08:20

## 2025-04-17 RX ADMIN — LEVOTHYROXINE SODIUM 50 MCG: 0.05 TABLET ORAL at 06:09

## 2025-04-17 RX ADMIN — PAROXETINE HYDROCHLORIDE 20 MG: 20 TABLET, FILM COATED ORAL at 08:20

## 2025-04-17 RX ADMIN — HYDROCODONE BITARTRATE AND ACETAMINOPHEN 1 TABLET: 5; 325 TABLET ORAL at 09:29

## 2025-04-17 RX ADMIN — HEPARIN SODIUM 5000 UNITS: 5000 INJECTION, SOLUTION INTRAVENOUS; SUBCUTANEOUS at 06:10

## 2025-04-17 RX ADMIN — HEPARIN SODIUM 5000 UNITS: 5000 INJECTION, SOLUTION INTRAVENOUS; SUBCUTANEOUS at 21:00

## 2025-04-17 RX ADMIN — HYDROCODONE BITARTRATE AND ACETAMINOPHEN 1 TABLET: 5; 325 TABLET ORAL at 03:00

## 2025-04-17 RX ADMIN — Medication 3 MG: at 21:00

## 2025-04-17 RX ADMIN — ACETAMINOPHEN 650 MG: 325 TABLET ORAL at 21:37

## 2025-04-17 RX ADMIN — PANTOPRAZOLE SODIUM 40 MG: 40 TABLET, DELAYED RELEASE ORAL at 08:21

## 2025-04-17 RX ADMIN — CARVEDILOL 25 MG: 25 TABLET, FILM COATED ORAL at 02:30

## 2025-04-17 SDOH — SOCIAL STABILITY: SOCIAL INSECURITY: WITHIN THE LAST YEAR, HAVE YOU BEEN AFRAID OF YOUR PARTNER OR EX-PARTNER?: NO

## 2025-04-17 SDOH — SOCIAL STABILITY: SOCIAL INSECURITY
WITHIN THE LAST YEAR, HAVE YOU BEEN RAPED OR FORCED TO HAVE ANY KIND OF SEXUAL ACTIVITY BY YOUR PARTNER OR EX-PARTNER?: NO

## 2025-04-17 SDOH — ECONOMIC STABILITY: INCOME INSECURITY: IN THE PAST 12 MONTHS HAS THE ELECTRIC, GAS, OIL, OR WATER COMPANY THREATENED TO SHUT OFF SERVICES IN YOUR HOME?: NO

## 2025-04-17 SDOH — SOCIAL STABILITY: SOCIAL INSECURITY
WITHIN THE LAST YEAR, HAVE YOU BEEN KICKED, HIT, SLAPPED, OR OTHERWISE PHYSICALLY HURT BY YOUR PARTNER OR EX-PARTNER?: NO

## 2025-04-17 SDOH — ECONOMIC STABILITY: TRANSPORTATION INSECURITY: IN THE PAST 12 MONTHS, HAS LACK OF TRANSPORTATION KEPT YOU FROM MEDICAL APPOINTMENTS OR FROM GETTING MEDICATIONS?: NO

## 2025-04-17 SDOH — ECONOMIC STABILITY: HOUSING INSECURITY: IN THE LAST 12 MONTHS, WAS THERE A TIME WHEN YOU WERE NOT ABLE TO PAY THE MORTGAGE OR RENT ON TIME?: NO

## 2025-04-17 SDOH — ECONOMIC STABILITY: FOOD INSECURITY: WITHIN THE PAST 12 MONTHS, THE FOOD YOU BOUGHT JUST DIDN'T LAST AND YOU DIDN'T HAVE MONEY TO GET MORE.: NEVER TRUE

## 2025-04-17 SDOH — SOCIAL STABILITY: SOCIAL INSECURITY: HAVE YOU HAD THOUGHTS OF HARMING ANYONE ELSE?: NO

## 2025-04-17 SDOH — SOCIAL STABILITY: SOCIAL INSECURITY: WERE YOU ABLE TO COMPLETE ALL THE BEHAVIORAL HEALTH SCREENINGS?: YES

## 2025-04-17 SDOH — SOCIAL STABILITY: SOCIAL INSECURITY: ABUSE: ADULT

## 2025-04-17 SDOH — ECONOMIC STABILITY: FOOD INSECURITY: WITHIN THE PAST 12 MONTHS, YOU WORRIED THAT YOUR FOOD WOULD RUN OUT BEFORE YOU GOT THE MONEY TO BUY MORE.: NEVER TRUE

## 2025-04-17 SDOH — SOCIAL STABILITY: SOCIAL INSECURITY: WITHIN THE LAST YEAR, HAVE YOU BEEN HUMILIATED OR EMOTIONALLY ABUSED IN OTHER WAYS BY YOUR PARTNER OR EX-PARTNER?: NO

## 2025-04-17 SDOH — SOCIAL STABILITY: SOCIAL INSECURITY: DOES ANYONE TRY TO KEEP YOU FROM HAVING/CONTACTING OTHER FRIENDS OR DOING THINGS OUTSIDE YOUR HOME?: NO

## 2025-04-17 SDOH — SOCIAL STABILITY: SOCIAL INSECURITY: ARE THERE ANY APPARENT SIGNS OF INJURIES/BEHAVIORS THAT COULD BE RELATED TO ABUSE/NEGLECT?: NO

## 2025-04-17 SDOH — ECONOMIC STABILITY: HOUSING INSECURITY: IN THE PAST 12 MONTHS, HOW MANY TIMES HAVE YOU MOVED WHERE YOU WERE LIVING?: 0

## 2025-04-17 SDOH — SOCIAL STABILITY: SOCIAL INSECURITY: DO YOU FEEL ANYONE HAS EXPLOITED OR TAKEN ADVANTAGE OF YOU FINANCIALLY OR OF YOUR PERSONAL PROPERTY?: NO

## 2025-04-17 SDOH — ECONOMIC STABILITY: FOOD INSECURITY: HOW HARD IS IT FOR YOU TO PAY FOR THE VERY BASICS LIKE FOOD, HOUSING, MEDICAL CARE, AND HEATING?: NOT HARD AT ALL

## 2025-04-17 SDOH — ECONOMIC STABILITY: HOUSING INSECURITY: AT ANY TIME IN THE PAST 12 MONTHS, WERE YOU HOMELESS OR LIVING IN A SHELTER (INCLUDING NOW)?: NO

## 2025-04-17 SDOH — SOCIAL STABILITY: SOCIAL INSECURITY: HAVE YOU HAD ANY THOUGHTS OF HARMING ANYONE ELSE?: NO

## 2025-04-17 SDOH — SOCIAL STABILITY: SOCIAL INSECURITY: HAS ANYONE EVER THREATENED TO HURT YOUR FAMILY OR YOUR PETS?: NO

## 2025-04-17 SDOH — SOCIAL STABILITY: SOCIAL INSECURITY: ARE YOU OR HAVE YOU BEEN THREATENED OR ABUSED PHYSICALLY, EMOTIONALLY, OR SEXUALLY BY ANYONE?: NO

## 2025-04-17 SDOH — SOCIAL STABILITY: SOCIAL INSECURITY: DO YOU FEEL UNSAFE GOING BACK TO THE PLACE WHERE YOU ARE LIVING?: NO

## 2025-04-17 ASSESSMENT — ENCOUNTER SYMPTOMS
DIZZINESS: 1
DIFFICULTY URINATING: 0
APNEA: 0
FEVER: 0
SINUS PAIN: 0
AGITATION: 0
NERVOUS/ANXIOUS: 0
JOINT SWELLING: 0
COUGH: 0
APPETITE CHANGE: 0
CHOKING: 0
ABDOMINAL DISTENTION: 0
DIARRHEA: 0
ARTHRALGIAS: 0
SPEECH DIFFICULTY: 0
HEMATURIA: 0
RHINORRHEA: 0
CHEST TIGHTNESS: 0
SHORTNESS OF BREATH: 0
HEADACHES: 0
PALPITATIONS: 0
SEIZURES: 0
NECK PAIN: 0
BACK PAIN: 0
STRIDOR: 0
FACIAL ASYMMETRY: 0
FACIAL SWELLING: 0
NAUSEA: 0
DECREASED CONCENTRATION: 0
WOUND: 0
CONFUSION: 0
TREMORS: 0
NUMBNESS: 0
VOMITING: 0
HALLUCINATIONS: 0
COLOR CHANGE: 0
WHEEZING: 0
SORE THROAT: 0
FREQUENCY: 0
FLANK PAIN: 0
ACTIVITY CHANGE: 0
FATIGUE: 0
CHILLS: 0
DIAPHORESIS: 0
CONSTIPATION: 0
WEAKNESS: 0
MYALGIAS: 0
ABDOMINAL PAIN: 0
DYSURIA: 0
LIGHT-HEADEDNESS: 1

## 2025-04-17 ASSESSMENT — ACTIVITIES OF DAILY LIVING (ADL)
ADEQUATE_TO_COMPLETE_ADL: YES
LACK_OF_TRANSPORTATION: NO
BATHING: NEEDS ASSISTANCE
GROOMING: NEEDS ASSISTANCE
LACK_OF_TRANSPORTATION: NO
HEARING - RIGHT EAR: FUNCTIONAL
TOILETING: NEEDS ASSISTANCE
WALKS IN HOME: NEEDS ASSISTANCE
DRESSING YOURSELF: NEEDS ASSISTANCE
BATHING_ASSISTANCE: MAXIMAL
PATIENT'S MEMORY ADEQUATE TO SAFELY COMPLETE DAILY ACTIVITIES?: YES
JUDGMENT_ADEQUATE_SAFELY_COMPLETE_DAILY_ACTIVITIES: YES
ASSISTIVE_DEVICE: WALKER
ADL_ASSISTANCE: INDEPENDENT
FEEDING YOURSELF: NEEDS ASSISTANCE
HEARING - LEFT EAR: FUNCTIONAL

## 2025-04-17 ASSESSMENT — PAIN SCALES - GENERAL
PAINLEVEL_OUTOF10: 0 - NO PAIN
PAINLEVEL_OUTOF10: 7
PAINLEVEL_OUTOF10: 3
PAINLEVEL_OUTOF10: 5 - MODERATE PAIN
PAINLEVEL_OUTOF10: 3
PAINLEVEL_OUTOF10: 7
PAINLEVEL_OUTOF10: 0 - NO PAIN
PAINLEVEL_OUTOF10: 8
PAINLEVEL_OUTOF10: 3

## 2025-04-17 ASSESSMENT — PAIN - FUNCTIONAL ASSESSMENT
PAIN_FUNCTIONAL_ASSESSMENT: 0-10

## 2025-04-17 ASSESSMENT — LIFESTYLE VARIABLES
AUDIT-C TOTAL SCORE: 0
AUDIT-C TOTAL SCORE: 0
HOW MANY STANDARD DRINKS CONTAINING ALCOHOL DO YOU HAVE ON A TYPICAL DAY: PATIENT DOES NOT DRINK
HOW OFTEN DO YOU HAVE A DRINK CONTAINING ALCOHOL: NEVER
SKIP TO QUESTIONS 9-10: 1
HOW OFTEN DO YOU HAVE 6 OR MORE DRINKS ON ONE OCCASION: NEVER

## 2025-04-17 ASSESSMENT — COGNITIVE AND FUNCTIONAL STATUS - GENERAL
TURNING FROM BACK TO SIDE WHILE IN FLAT BAD: A LITTLE
PERSONAL GROOMING: A LITTLE
DRESSING REGULAR LOWER BODY CLOTHING: A LITTLE
DRESSING REGULAR LOWER BODY CLOTHING: TOTAL
HELP NEEDED FOR BATHING: A LITTLE
PERSONAL GROOMING: A LITTLE
EATING MEALS: A LITTLE
DRESSING REGULAR UPPER BODY CLOTHING: A LITTLE
TOILETING: A LITTLE
STANDING UP FROM CHAIR USING ARMS: A LOT
MOVING TO AND FROM BED TO CHAIR: A LITTLE
TOILETING: TOTAL
MOVING FROM LYING ON BACK TO SITTING ON SIDE OF FLAT BED WITH BEDRAILS: A LITTLE
DRESSING REGULAR UPPER BODY CLOTHING: A LOT
MOVING FROM LYING ON BACK TO SITTING ON SIDE OF FLAT BED WITH BEDRAILS: A LITTLE
HELP NEEDED FOR BATHING: A LITTLE
MOVING TO AND FROM BED TO CHAIR: A LOT
DRESSING REGULAR LOWER BODY CLOTHING: A LITTLE
WALKING IN HOSPITAL ROOM: A LOT
MOVING FROM LYING ON BACK TO SITTING ON SIDE OF FLAT BED WITH BEDRAILS: A LITTLE
CLIMB 3 TO 5 STEPS WITH RAILING: A LOT
STANDING UP FROM CHAIR USING ARMS: A LOT
TURNING FROM BACK TO SIDE WHILE IN FLAT BAD: A LOT
MOVING TO AND FROM BED TO CHAIR: A LITTLE
DAILY ACTIVITIY SCORE: 12
TOILETING: A LITTLE
MOBILITY SCORE: 15
PERSONAL GROOMING: A LITTLE
STANDING UP FROM CHAIR USING ARMS: A LOT
PATIENT BASELINE BEDBOUND: NO
DAILY ACTIVITIY SCORE: 19
HELP NEEDED FOR BATHING: A LOT
MOBILITY SCORE: 11
CLIMB 3 TO 5 STEPS WITH RAILING: TOTAL
WALKING IN HOSPITAL ROOM: A LOT
WALKING IN HOSPITAL ROOM: TOTAL
TURNING FROM BACK TO SIDE WHILE IN FLAT BAD: A LITTLE
DRESSING REGULAR UPPER BODY CLOTHING: A LITTLE
MOBILITY SCORE: 15
DAILY ACTIVITIY SCORE: 19
CLIMB 3 TO 5 STEPS WITH RAILING: A LOT

## 2025-04-17 ASSESSMENT — PAIN DESCRIPTION - LOCATION
LOCATION: COCCYX
LOCATION: BUTTOCKS

## 2025-04-17 ASSESSMENT — PATIENT HEALTH QUESTIONNAIRE - PHQ9
2. FEELING DOWN, DEPRESSED OR HOPELESS: NOT AT ALL
1. LITTLE INTEREST OR PLEASURE IN DOING THINGS: NOT AT ALL
SUM OF ALL RESPONSES TO PHQ9 QUESTIONS 1 & 2: 0

## 2025-04-17 NOTE — PROGRESS NOTES
Occupational Therapy  Evaluation    Patient Name: Gladys Salas  MRN: [id number]  Today's Date: 4/17/2025  Time Calculation  Start Time: 1322  Stop Time: 1346  Time Calculation (min): 24 min    Current Problem:   1. Fall, initial encounter    2. Acute midline back pain, unspecified back location    3. Demand ischemia (Multi)    4. Acute kidney injury    5. Lightheadedness    6. PAREKH (dyspnea on exertion)    7. Dyspnea, unspecified    8. Other specified abnormal findings of blood chemistry        OT order: OT eval and treat   Referred by: Selvin  Reason for referral: ADLs, safety assessment  Past medical history related to rehab: FELL HIT HEAD, C/O TAILBONE PAIN & HA,LIGHTHEADED FOR A COUPLE WEEKS; DX: BACKPAIN, DEMAND ISCHEMIA, DUNIA, FALL, LIGHTHEADED; HX: TRAUMATIC CEREBRALHEMORRHAGE, SVT, AFIB, HF, HTN, RENAL INSUFFICIANCY, OSTEOPOROSIS, B12/VIT D DEFICIANCY, HYPOTHYROOID, HUMERAL FX, ANXIETY, DM    Precautions:   Hearing/Visual Limitations: [place]  Medical Precautions: Fall precautions    ASSESSMENT  OT Assessment: OT eval completed. The patient is functioning below baseline for ADLs and mobility. can benefit from continued OT. Pt with Decreased ADL status, Decreased upper extremity strength, Decreased safe judgment during ADL, Decreased endurance, Decreased cognition, Decreased IADLs, Decreased gross motor control, Decreased functional mobility  Prognosis:    Barriers to discharge home: Caregiver assistance, Physical needs  Patient lives alone and/or does not have reliable caregiver assistance  24hr mobility assistance needed, 24hr ADL assistance needed, Ambulating household distances limited by function/safety, Stair navigation into home limited by function/safety     Tolerance:      PLAN  Frequency: 3 times per week  Treatment Interventions: ADL retraining, Functional transfer training, UE strengthening/ROM, Endurance training, Cognitive reorientation, Patient/family training, Neuromuscular reeducation, Equipment  "evaluation/education  Discharge Recommendations: Moderate intensity level of continued care  OT OK to discharge: Yes    GENERAL VISIT INFORMATION   Start of session communication: Bedside nurse  End of session communication: Bedside nurse  Family/caregiver present: No  Caregiver feedback:    Co-Treatment: PT  Reason for co-treatment: to optimize safety and mobility, while focusing on discipline specific goals   Position Pt Received:  Bed, 3 rail up, Alarm on  End of session position: Bed, 3 rail up, Alarm on    SUBJECTIVE  Home Living:  Home Living Comments: ALONE 1 LEVEL HOME, 2 STEP/BREEZEWAY/1 STEP TO ENTER HOME, ROLLATOR OUTSIDE, FW INSIDE, A W/SHOWER- WALK INSHOWER/SEAT/BARS, A FROM DTR, ARMS AROUND TOILET, DTR DOESCHORES, PT. DOES MICROWAVEMEALS, DTR DRIVES TO APPTS     Prior Level of Function:  Receives Help From: Family  ADL Assistance: Independent  Homemaking Assistance: Needs assistance  Ambulatory Assistance: Independent (FWW)    IADL History:       Pain:  Assessment: 0-10  Score:  (no ratinggiven)  Type: Acute pain  Location:  (\"tailbone\")  Interventions: Repositioned, Ambulation/increased activity  Response to pain interventions:      OBJECTIVE  Vital Signs:       Cognition:  Overall Cognitive Status: Within Functional Limits  Arousal/Alertness: Appropriate responses to stimuli  Orientation Level: Oriented X4             Current ADL function:   EATING:  Minimal     GROOMING: Minimal     BATHING: Maximal     UB DRESSING: Minimal     LB DRESSING: Total     TOILETING: Total    ADL comments:       Activity Tolerance:  Endurance: Decreased tolerance for upright activites    Bed Mobility/Transfers:   Bed Mobility  Bed Mobility: Yes  Bed Mobility 1  Bed Mobility 1: Supine to sitting, Sitting to supine  Level of Assistance 1: Minimum assistance, Moderate assistance, +2  Transfers  Transfer: Yes  Transfer 1  Transfer From 1: Sit to  Transfer to 1: Stand  Technique 1: Stand to sit  Transfer Level of Assistance 1: " Moderate assistance, +2    Ambulation/Gait Training:  Functional Mobility  Functional Mobility Performed:  (pt took 1-2 steps towards HOB with mod A x2 hand held assistance. pt unsteady and needed max A to maintain balance. dizziness throughout session)    Sitting Balance:  Static Sitting Balance  Static Sitting-Level of Assistance: Close supervision  Dynamic Sitting Balance  Dynamic Sitting-Level of Assistance: Close supervision    Standing Balance:  Static Standing Balance  Static Standing-Level of Assistance: Moderate assistance, Minimum assistance  Dynamic Standing Balance  Dynamic Standing-Level of Assistance: Minimum assistance, Moderate assistance    Vision: Vision - Basic Assessment  Current Vision: No visual deficits   and      Sensation:  Light Touch: No apparent deficits    Strength:  Strength Comments: BUE grossly 3+/5    Perception:  Inattention/Neglect: Appears intact    Coordination:  Movements are Fluid and Coordinated: Yes     Hand Function:  Hand Function  Gross Grasp: Functional    Extremities: RUE   RUE : Within Functional Limits and LUE   LUE: Within Functional Limits    Outcome Measures: [facility] Daily Activity   Putting on and taking off regular lower body clothing: Total  Bathing (including washing, rinsing, drying): A lot  Putting on and taking off regular upper body clothing: A lot  Toileting, which includes using toilet, bedpan or urinal: Total  Taking care of personal grooming such as brushing teeth: A little   Eating Meals: A little   Daily Activity - Total Score: 12    EDUCATION:     Education Documentation  ADL Training, taught by Anita Magana OT at 4/17/2025  3:29 PM.  Learner: Patient  Readiness: Acceptance  Method: Explanation  Response: Needs Reinforcement    Education Comments  No comments found.        Goals:   Encounter Problems       Encounter Problems (Active)       ADLs       Patient will complete daily grooming tasks brushing teeth and washing face/hair with set-up,  supervision level of assistance and PRN adaptive equipment while supported sitting. (Progressing)       Start:  04/17/25    Expected End:  05/01/25               EXERCISE/STRENGTHENING       Patient with increase BUE by 1/2 MMT grade strength. (Progressing)       Start:  04/17/25    Expected End:  05/01/25               TRANSFERS       Patient will complete functional transfers with least restrictive device with modified independent level of assistance. (Progressing)       Start:  04/17/25    Expected End:  05/01/25

## 2025-04-17 NOTE — NURSING NOTE
1430:  Patient admitted to floor, introduced myself to the patient. Educated them on room, bed, call light, and medical equipment. Telemetry applied. Vital signs obtained. Patient assessed at this time and all orders reviewed

## 2025-04-17 NOTE — ED NOTES
Pt arrives to ED via ambulance from home with c/o dizziness, fall, and tailbone pain.    Code Status:  DNR and No Intubation    HPI     Chief Complaint   Patient presents with    Fall    Tailbone Pain    Headache       /66 (BP Location: Left arm, Patient Position: Sitting)   Pulse 71   Temp 36.6 °C (97.9 °F) (Tympanic)   Resp 20   Wt 49.9 kg (110 lb)   SpO2 98%     [place] Coma Scale Score: 15      LDA:   Peripheral IV 04/16/25 20 G Right Antecubital (Active)   Placement Date/Time: 04/16/25 2025   Hand Hygiene Completed: Yes  Size (Gauge): 20 G  Orientation: Right  Location: Antecubital  Site Prep: Chlorhexidine    Number of days: 0       Urethral Catheter 16 Fr. (Active)   Placement Date/Time: 04/17/25 0910   Placed by: Mayra Evans RN  Hand Hygiene Completed: Yes  Tube Size (Fr.): 16 Fr.  Catheter Balloon Size: 10 mL  Urine Returned: Yes   Number of days: 0        BACKGROUND  Medical History[1]  Surgical History[2]  Medications Ordered Prior to Encounter[3]     ASSESSMENT  ED Course as of 04/17/25 1018   Thu Apr 17, 2025   0137 EKG as interpreted by myself demonstrates atrial fibrillation with a rate of 89, normal axis, normal QRS and QTc interval, no evidence of an acute STEMI. [NS]      ED Course User Index  [NS] Jero Alicea MD         Diagnoses as of 04/17/25 1018   Fall, initial encounter   Acute midline back pain, unspecified back location   Demand ischemia (Multi)   Acute kidney injury   Lightheadedness       Medications Currently Running:  Continuous Medications[4]     Medications Given:  ED Medication Administration from 04/16/2025 1747 to 04/17/2025 1018         Date/Time Order Dose Route Action Action by     04/16/2025 2111 EDT sodium chloride 0.9 % bolus 500 mL 500 mL intravenous New Bag MISHA Rai     04/16/2025 2116 EDT acetaminophen (Tylenol) tablet 975 mg 975 mg oral Given STORM Rowe     04/16/2025 2319 EDT sodium chloride 0.9 % bolus 500 mL 0 mL intravenous Stopped Fredi  J     04/17/2025 0034 EDT sodium chloride 0.9 % bolus 500 mL 500 mL intravenous New Bag [facility], S     04/17/2025 0214 EDT sodium chloride 0.9 % bolus 500 mL 0 mL intravenous Stopped [facility], S     04/17/2025 0230 EDT aspirin chewable tablet 324 mg 324 mg oral Given Robby, D     04/17/2025 0230 EDT carvedilol (Coreg) tablet 25 mg 25 mg oral Given Robby, D 04/17/2025 0230 EDT sodium chloride 0.9% infusion 75 mL/hr intravenous New Bag Robby, D     04/17/2025 0300 EDT HYDROcodone-acetaminophen (Norco) 5-325 mg per tablet 1 tablet 1 tablet oral Given Robby, D 04/17/2025 0557 EDT sodium chloride 0.9% infusion 75 mL/hr intravenous Rate/Dose Verify Robbyk, D 04/17/2025 0609 EDT levothyroxine (Synthroid, Levoxyl) tablet 50 mcg 50 mcg oral Given Robbyk, D 04/17/2025 0610 EDT heparin (porcine) injection 5,000 Units 5,000 Units subcutaneous Given Robbyk, D 04/17/2025 0616 EDT insulin lispro injection 0-5 Units -- subcutaneous Not Given [facility], D 04/17/2025 0820 EDT carvedilol (Coreg) tablet 25 mg 25 mg oral Given [place], D 04/17/2025 0820 EDT PARoxetine (Paxil) tablet 20 mg 20 mg oral Given Evans, D 04/17/2025 0820 EDT spironolactone (Aldactone) tablet 12.5 mg 12.5 mg oral Given Evans, D 04/17/2025 0821 EDT pantoprazole (ProtoNix) EC tablet 40 mg 40 mg oral Given [place], D 04/17/2025 0821 EDT pantoprazole (Protonix) injection 40 mg -- intravenous See Alternative Evans, D 04/17/2025 0823 EDT sodium chloride 0.9% infusion 75 mL/hr intravenous Rate/Dose Verify Evans, D 04/17/2025 0929 EDT HYDROcodone-acetaminophen (Norco) 5-325 mg per tablet 1 tablet 1 tablet oral Given JOEL Evans                 RESULTS    Imaging:  CT pelvis wo IV contrast   Final Result   1. No acute intracranial abnormality or calvarial fracture.        2. No acute fracture or traumatic malalignment of the cervical,   thoracic or lumbar spine.        3. Chronic compression  fracture of T7 with 90% height loss and   minimal retropulsion not resulting in canal stenosis.        4. No acute fracture of the pelvis, sacrum, or proximal right or left   femur. Chronic fractures of the superior and inferior pubic rami and   sacrum.        5. Spondylotic changes of the cervical, thoracic, and lumbar spine.   No high-grade cervical or thoracic spinal canal stenosis. There is   high-grade L4-5 lumbar spinal canal stenosis unchanged from prior   study due to anterolisthesis of L4 on L5 associated with severe right   foraminal stenosis.        6. Bilateral nonobstructing renal calculi and bladder calculi.        MACRO:   None.        Signed by: Caio Jasmine 4/16/2025 11:23 PM   Dictation workstation:   [id number]      CT lumbar spine wo IV contrast   Final Result   1. No acute intracranial abnormality or calvarial fracture.        2. No acute fracture or traumatic malalignment of the cervical,   thoracic or lumbar spine.        3. Chronic compression fracture of T7 with 90% height loss and   minimal retropulsion not resulting in canal stenosis.        4. No acute fracture of the pelvis, sacrum, or proximal right or left   femur. Chronic fractures of the superior and inferior pubic rami and   sacrum.        5. Spondylotic changes of the cervical, thoracic, and lumbar spine.   No high-grade cervical or thoracic spinal canal stenosis. There is   high-grade L4-5 lumbar spinal canal stenosis unchanged from prior   study due to anterolisthesis of L4 on L5 associated with severe right   foraminal stenosis.        6. Bilateral nonobstructing renal calculi and bladder calculi.        MACRO:   None.        Signed by: Caio Jasmine 4/16/2025 11:23 PM   Dictation workstation:   [id number]      CT thoracic spine wo IV contrast   Final Result   1. No acute intracranial abnormality or calvarial fracture.        2. No acute fracture or traumatic malalignment of the cervical,   thoracic or lumbar spine.         3. Chronic compression fracture of T7 with 90% height loss and   minimal retropulsion not resulting in canal stenosis.        4. No acute fracture of the pelvis, sacrum, or proximal right or left   femur. Chronic fractures of the superior and inferior pubic rami and   sacrum.        5. Spondylotic changes of the cervical, thoracic, and lumbar spine.   No high-grade cervical or thoracic spinal canal stenosis. There is   high-grade L4-5 lumbar spinal canal stenosis unchanged from prior   study due to anterolisthesis of L4 on L5 associated with severe right   foraminal stenosis.        6. Bilateral nonobstructing renal calculi and bladder calculi.        MACRO:   None.        Signed by: Caio Jasmine 4/16/2025 11:23 PM   Dictation workstation:   [id number]      CT cervical spine wo IV contrast   Final Result   1. No acute intracranial abnormality or calvarial fracture.        2. No acute fracture or traumatic malalignment of the cervical,   thoracic or lumbar spine.        3. Chronic compression fracture of T7 with 90% height loss and   minimal retropulsion not resulting in canal stenosis.        4. No acute fracture of the pelvis, sacrum, or proximal right or left   femur. Chronic fractures of the superior and inferior pubic rami and   sacrum.        5. Spondylotic changes of the cervical, thoracic, and lumbar spine.   No high-grade cervical or thoracic spinal canal stenosis. There is   high-grade L4-5 lumbar spinal canal stenosis unchanged from prior   study due to anterolisthesis of L4 on L5 associated with severe right   foraminal stenosis.        6. Bilateral nonobstructing renal calculi and bladder calculi.        MACRO:   None.        Signed by: Caio Jasmine 4/16/2025 11:23 PM   Dictation workstation:   [id number]      CT head wo IV contrast   Final Result   1. No acute intracranial abnormality or calvarial fracture.        2. No acute fracture or traumatic malalignment of the cervical,    thoracic or lumbar spine.        3. Chronic compression fracture of T7 with 90% height loss and   minimal retropulsion not resulting in canal stenosis.        4. No acute fracture of the pelvis, sacrum, or proximal right or left   femur. Chronic fractures of the superior and inferior pubic rami and   sacrum.        5. Spondylotic changes of the cervical, thoracic, and lumbar spine.   No high-grade cervical or thoracic spinal canal stenosis. There is   high-grade L4-5 lumbar spinal canal stenosis unchanged from prior   study due to anterolisthesis of L4 on L5 associated with severe right   foraminal stenosis.        6. Bilateral nonobstructing renal calculi and bladder calculi.        MACRO:   None.        Signed by: Caio Jasmine 4/16/2025 11:23 PM   Dictation workstation:   [id number]      Transthoracic Echo (TTE) Limited    (Results Pending)      }  Labs ::99  Abnormal Labs Reviewed   CBC WITH AUTO DIFFERENTIAL - Abnormal; Notable for the following components:       Result Value    RBC 5.53 (*)     Hemoglobin 16.2 (*)     Hematocrit 48.8 (*)     Neutrophils Absolute 8.45 (*)     All other components within normal limits   COMPREHENSIVE METABOLIC PANEL - Abnormal; Notable for the following components:    Glucose 124 (*)     Chloride 97 (*)     Urea Nitrogen 55 (*)     Creatinine 1.55 (*)     eGFR 31 (*)     All other components within normal limits   B-TYPE NATRIURETIC PEPTIDE - Abnormal; Notable for the following components:     (*)     All other components within normal limits    Narrative:        <100 pg/mL - Heart failure unlikely                  100-299 pg/mL - Intermediate probability of acute heart                                  failure exacerbation. Correlate with clinical                                  context and patient history.                    >=300 pg/mL - Heart Failure likely. Correlate with clinical                                  context and patient history.                                     BNP testing is performed using different testing methodology at [facility] than at other [facility]. Direct result comparisons should only be made within the same method.                      BLOOD GAS VENOUS FULL PANEL - Abnormal; Notable for the following components:    POCT Hematocrit Calculated, Venous 51.0 (*)     POCT Sodium, Venous 131 (*)     POCT Chloride, Venous 96 (*)     POCT Glucose, Venous 131 (*)     POCT Lactate, Venous 2.1 (*)     POCT Base Excess, Venous 4.6 (*)     POCT HCO3 Calculated, Venous 29.9 (*)     POCT Hemoglobin, Venous 16.9 (*)     POCT Anion Gap, Venous 9.0 (*)     All other components within normal limits   SERIAL TROPONIN-INITIAL - Abnormal; Notable for the following components:    Troponin I, High Sensitivity 58 (*)     All other components within normal limits    Narrative:     Less than 99th percentile of normal range cutoff-                  Female and children under 18 years old <14 ng/L; Male <21 ng/L: Negative                  Repeat testing should be performed if clinically indicated.                                     Female and children under 18 years old 14-50 ng/L; Male 21-50 ng/L:                  Consistent with possible cardiac damage and possible increased clinical                   risk. Serial measurements may help to assess extent of myocardial damage.                                     >50 ng/L: Consistent with cardiac damage, increased clinical risk and                  myocardial infarction. Serial measurements may help assess extent of                   myocardial damage.                                      NOTE: Children less than 1 year old may have higher baseline troponin                   levels and results should be interpreted in conjunction with the overall                   clinical context.                                     NOTE: Troponin I testing is performed using a different                   testing  methodology at [facility] than at other                   [facility]. Direct result comparisons should only                   be made within the same method.   SERIAL TROPONIN, 1 HOUR - Abnormal; Notable for the following components:    Troponin I, High Sensitivity 65 (*)     All other components within normal limits    Narrative:     Less than 99th percentile of normal range cutoff-                  Female and children under 18 years old <14 ng/L; Male <21 ng/L: Negative                  Repeat testing should be performed if clinically indicated.                                     Female and children under 18 years old 14-50 ng/L; Male 21-50 ng/L:                  Consistent with possible cardiac damage and possible increased clinical                   risk. Serial measurements may help to assess extent of myocardial damage.                                     >50 ng/L: Consistent with cardiac damage, increased clinical risk and                  myocardial infarction. Serial measurements may help assess extent of                   myocardial damage.                                      NOTE: Children less than 1 year old may have higher baseline troponin                   levels and results should be interpreted in conjunction with the overall                   clinical context.                                     NOTE: Troponin I testing is performed using a different                   testing methodology at [facility] than at other                   [facility]. Direct result comparisons should only                   be made within the same method.   CBC - Abnormal; Notable for the following components:    Platelets 131 (*)     All other components within normal limits   BASIC METABOLIC PANEL - Abnormal; Notable for the following components:    Glucose 113 (*)     Urea Nitrogen 50 (*)     Creatinine 1.48 (*)     eGFR 32 (*)     Calcium 7.9 (*)     All other components  within normal limits   TROPONIN I, HIGH SENSITIVITY - Abnormal; Notable for the following components:    Troponin I, High Sensitivity 66 (*)     All other components within normal limits    Narrative:     Less than 99th percentile of normal range cutoff-                  Female and children under 18 years old <14 ng/L; Male <21 ng/L: Negative                  Repeat testing should be performed if clinically indicated.                                     Female and children under 18 years old 14-50 ng/L; Male 21-50 ng/L:                  Consistent with possible cardiac damage and possible increased clinical                   risk. Serial measurements may help to assess extent of myocardial damage.                                     >50 ng/L: Consistent with cardiac damage, increased clinical risk and                  myocardial infarction. Serial measurements may help assess extent of                   myocardial damage.                                      NOTE: Children less than 1 year old may have higher baseline troponin                   levels and results should be interpreted in conjunction with the overall                   clinical context.                                     NOTE: Troponin I testing is performed using a different                   testing methodology at [facility] than at other                   [facility]. Direct result comparisons should only                   be made within the same method.   POCT GLUCOSE - Abnormal; Notable for the following components:    POCT Glucose 111 (*)     All other components within normal limits                   [1]   Past Medical History:  Diagnosis Date    Contact with and (suspected) exposure to covid-19 11/26/2022    Fall in home 11/26/2022    Comment on above: FALL AT HOME 11AM    Impacted cerumen 04/18/2024   [2]   Past Surgical History:  Procedure Laterality Date    OTHER SURGICAL HISTORY  12/10/2019    Hysterectomy    OTHER  SURGICAL HISTORY  12/10/2019    Lumpectomy   [3]   No current facility-administered medications on file prior to encounter.     Current Outpatient Medications on File Prior to Encounter   Medication Sig Dispense Refill    carvedilol (Coreg) 25 mg tablet TAKE 1 TABLET TWICE A  tablet 3    denosumab (Prolia) 60 mg/mL syringe Inject 1 mL (60 mg total) under the skin 1 time for 1 dose. (Patient not taking: Reported on 2/5/2025) 1 mL 1    furosemide (Lasix) 40 mg tablet Take 1 tablet (40 mg) by mouth once daily. 90 tablet 3    levothyroxine (Synthroid, Levoxyl) 50 mcg tablet TAKE 1 TABLET DAILY IN THE MORNING BEFORE A MEAL 90 tablet 3    PARoxetine (Paxil) 20 mg tablet Take 1 tablet (20 mg) by mouth once daily. 90 tablet 3    spironolactone (Aldactone) 25 mg tablet Take 0.5 tablets (12.5 mg) by mouth once daily. 45 tablet 1   [4] sodium chloride 0.9%, 75 mL/hr, Last Rate: 75 mL/hr (04/17/25 0823)       Mayra Evans, RN  04/17/25 1019

## 2025-04-17 NOTE — ASSESSMENT & PLAN NOTE
Continue home meds.  Update echocardiogram  4/17: LVEF was 66%.  Stage II diastolic dysfunction.  Suspect patient is dry at present.  Hold Lasix and Aldactone.  Relax fluid restriction.

## 2025-04-17 NOTE — H&P
[facility] - GENERAL MEDICINE HISTORY AND PHYSICAL    HISTORY OF PRESENT ILLNESS     History Obtained From (Primary Source): Patient  Collateral History (Secondary Sources): D/w ED physician    History Of Present Illness (HPI):  Gladys Salas is a 89 y.o. female with PMHx s/f cerebral hemorrhage, SVT, afib (not on anticoagulation due to pt choice), Hfpef, HTN, HLD, hypothyroidism, CKD presenting with fall and lightheadedness. Pt suffered a fall at her home today when she was reaching under the kitchen sink to get something. She became acutely lightheaded and fell to the floor on her tailbone. Had headache and tailbone pain, but no LOC. It took her about two hours to reach a phone to call for help. Pt denies any further falls, but admits to lightheaded episodes when standing up almost every day. Symptoms have been intermittent, but have acutely worsened. She describes it as a spinning sensation accompanied by pre-syncope. Her PCP took her off of Jardiance last month because pt felt it was causing these lightheaded symptoms. No chest pain, nausea, vomiting, palpitations, vision changes, abdominal pain, urinary complaints or other symptoms with this. She is taking her medications as prescribed.    ED Course:   Vitals on presentation: T 36.8 °C (98.2 °F)  HR 86  /73  RR 16  O2 99 % None (Room air)  Labs:   CBC with WBC 10.0, Hgb 16.2, Plts 157.   CMP with glucose 124, Na 136, K 4.0, BUN 55, sCr 1.55, alk phos 38, ALT 9, AST 20, bilirubin 1.1. Magnesium 2.02.   . Trop 65.   VBG pH 7.43, pCO2 45, pO2 44, lactate 2.1 -> 1.5, HCO3 29.9  EKG: afib at 89 bpm, no ST changes  Imaging - CT head/cervical/thoracic/lumbar/pelvs w/out contrast - 1. No acute intracranial abnormality or calvarial fracture.       2. No acute fracture or traumatic malalignment of the cervical,   thoracic or lumbar spine.       3. Chronic compression fracture of T7 with 90% height loss and   minimal retropulsion not  resulting in canal stenosis.       4. No acute fracture of the pelvis, sacrum, or proximal right or left   femur. Chronic fractures of the superior and inferior pubic rami and   sacrum.      5. Spondylotic changes of the cervical, thoracic, and lumbar spine.   No high-grade cervical or thoracic spinal canal stenosis. There is   high-grade L4-5 lumbar spinal canal stenosis unchanged from prior   study due to anterolisthesis of L4 on L5 associated with severe right   foraminal stenosis.       6. Bilateral nonobstructing renal calculi and bladder calculi.   agree with radiology interpretation(s):   Interventions: Tylenol 975 mg, NS 1 L bolus, pt admitted for further care.    12-point ROS reviewed and found to be negative aside from aforementioned positives in HPI and/or noted in dedicated ROS section below.     Decision made to admit the patient to the hospitalist service after evaluation of the patient, review of the above, and discussion with ED provider.     LABS AND IMAGING     I have personally reviewed the following labs on 04/17/25: CBC, CMP, Mag, Troponin, BNP, and VBG  I have personally reviewed the following imaging studies on 04/17/25: CT C-Spine, CT T-Spine, CT L-Spine, and CT Head without Contrast , with my personal interpretations as documented in ED course above.   I have personally reviewed the patient's vitals on presentation to the ED and any/all changes through to time of admission (on 04/17/25).     ED Course (From ED Provider):  ED Course as of 04/17/25 0226   Thu Apr 17, 2025   0137 EKG as interpreted by myself demonstrates atrial fibrillation with a rate of 89, normal axis, normal QRS and QTc interval, no evidence of an acute STEMI. [NS]      ED Course User Index  [NS] Jero Alicea MD         Diagnoses as of 04/17/25 0226   Fall, initial encounter   Acute midline back pain, unspecified back location   Demand ischemia (Multi)   Acute kidney injury   Lightheadedness     Relevant  Results  Results for orders placed or performed during the hospital encounter of 04/16/25 (from the past 24 hours)   CBC and Auto Differential   Result Value Ref Range    WBC 10.0 4.4 - 11.3 x10*3/uL    nRBC 0.0 0.0 - 0.0 /100 WBCs    RBC 5.53 (H) 4.00 - 5.20 x10*6/uL    Hemoglobin 16.2 (H) 12.0 - 16.0 g/dL    Hematocrit 48.8 (H) 36.0 - 46.0 %    MCV 88 80 - 100 fL    MCH 29.3 26.0 - 34.0 pg    MCHC 33.2 32.0 - 36.0 g/dL    RDW 13.2 11.5 - 14.5 %    Platelets 157 150 - 450 x10*3/uL    Neutrophils % 84.3 40.0 - 80.0 %    Immature Granulocytes %, Automated 0.2 0.0 - 0.9 %    Lymphocytes % 12.4 13.0 - 44.0 %    Monocytes % 2.7 2.0 - 10.0 %    Eosinophils % 0.2 0.0 - 6.0 %    Basophils % 0.2 0.0 - 2.0 %    Neutrophils Absolute 8.45 (H) 1.60 - 5.50 x10*3/uL    Immature Granulocytes Absolute, Automated 0.02 0.00 - 0.50 x10*3/uL    Lymphocytes Absolute 1.24 0.80 - 3.00 x10*3/uL    Monocytes Absolute 0.27 0.05 - 0.80 x10*3/uL    Eosinophils Absolute 0.02 0.00 - 0.40 x10*3/uL    Basophils Absolute 0.02 0.00 - 0.10 x10*3/uL   Comprehensive metabolic panel   Result Value Ref Range    Glucose 124 (H) 74 - 99 mg/dL    Sodium 136 136 - 145 mmol/L    Potassium 4.0 3.5 - 5.3 mmol/L    Chloride 97 (L) 98 - 107 mmol/L    Bicarbonate 28 21 - 32 mmol/L    Anion Gap 15 10 - 20 mmol/L    Urea Nitrogen 55 (H) 6 - 23 mg/dL    Creatinine 1.55 (H) 0.50 - 1.05 mg/dL    eGFR 31 (L) >60 mL/min/1.73m*2    Calcium 9.9 8.6 - 10.3 mg/dL    Albumin 4.3 3.4 - 5.0 g/dL    Alkaline Phosphatase 38 33 - 136 U/L    Total Protein 7.2 6.4 - 8.2 g/dL    AST 20 9 - 39 U/L    Bilirubin, Total 1.1 0.0 - 1.2 mg/dL    ALT 9 7 - 45 U/L   Magnesium   Result Value Ref Range    Magnesium 2.02 1.60 - 2.40 mg/dL   B-Type Natriuretic Peptide   Result Value Ref Range     (H) 0 - 99 pg/mL   Blood Gas Venous Full Panel   Result Value Ref Range    POCT pH, Venous 7.43 7.33 - 7.43 pH    POCT pCO2, Venous 45 41 - 51 mm Hg    POCT pO2, Venous 44 35 - 45 mm Hg    POCT  SO2, Venous 73 45 - 75 %    POCT Oxy Hemoglobin, Venous 71.3 45.0 - 75.0 %    POCT Hematocrit Calculated, Venous 51.0 (H) 36.0 - 46.0 %    POCT Sodium, Venous 131 (L) 136 - 145 mmol/L    POCT Potassium, Venous 3.9 3.5 - 5.3 mmol/L    POCT Chloride, Venous 96 (L) 98 - 107 mmol/L    POCT Ionized Calicum, Venous 1.23 1.10 - 1.33 mmol/L    POCT Glucose, Venous 131 (H) 74 - 99 mg/dL    POCT Lactate, Venous 2.1 (H) 0.4 - 2.0 mmol/L    POCT Base Excess, Venous 4.6 (H) -2.0 - 3.0 mmol/L    POCT HCO3 Calculated, Venous 29.9 (H) 22.0 - 26.0 mmol/L    POCT Hemoglobin, Venous 16.9 (H) 12.0 - 16.0 g/dL    POCT Anion Gap, Venous 9.0 (L) 10.0 - 25.0 mmol/L    Patient Temperature 37.0 degrees Celsius    FiO2 21 %   Troponin I, High Sensitivity, Initial   Result Value Ref Range    Troponin I, High Sensitivity 58 (HH) 0 - 13 ng/L   Troponin, High Sensitivity, 1 Hour   Result Value Ref Range    Troponin I, High Sensitivity 65 (HH) 0 - 13 ng/L   Blood Gas Lactic Acid, Venous   Result Value Ref Range    POCT Lactate, Venous 1.5 0.4 - 2.0 mmol/L      Imaging  CT head wo IV contrast  Result Date: 4/16/2025  1. No acute intracranial abnormality or calvarial fracture.   2. No acute fracture or traumatic malalignment of the cervical, thoracic or lumbar spine.   3. Chronic compression fracture of T7 with 90% height loss and minimal retropulsion not resulting in canal stenosis.   4. No acute fracture of the pelvis, sacrum, or proximal right or left femur. Chronic fractures of the superior and inferior pubic rami and sacrum.   5. Spondylotic changes of the cervical, thoracic, and lumbar spine. No high-grade cervical or thoracic spinal canal stenosis. There is high-grade L4-5 lumbar spinal canal stenosis unchanged from prior study due to anterolisthesis of L4 on L5 associated with severe right foraminal stenosis.   6. Bilateral nonobstructing renal calculi and bladder calculi.   MACRO: None.   Signed by: Caio Jasmine 4/16/2025 11:23 PM  Dictation workstation:   [id number]    CT cervical spine wo IV contrast  Result Date: 4/16/2025  1. No acute intracranial abnormality or calvarial fracture.   2. No acute fracture or traumatic malalignment of the cervical, thoracic or lumbar spine.   3. Chronic compression fracture of T7 with 90% height loss and minimal retropulsion not resulting in canal stenosis.   4. No acute fracture of the pelvis, sacrum, or proximal right or left femur. Chronic fractures of the superior and inferior pubic rami and sacrum.   5. Spondylotic changes of the cervical, thoracic, and lumbar spine. No high-grade cervical or thoracic spinal canal stenosis. There is high-grade L4-5 lumbar spinal canal stenosis unchanged from prior study due to anterolisthesis of L4 on L5 associated with severe right foraminal stenosis.   6. Bilateral nonobstructing renal calculi and bladder calculi.   MACRO: None.   Signed by: Caio Jasmine 4/16/2025 11:23 PM Dictation workstation:   [id number]    CT thoracic spine wo IV contrast  Result Date: 4/16/2025  1. No acute intracranial abnormality or calvarial fracture.   2. No acute fracture or traumatic malalignment of the cervical, thoracic or lumbar spine.   3. Chronic compression fracture of T7 with 90% height loss and minimal retropulsion not resulting in canal stenosis.   4. No acute fracture of the pelvis, sacrum, or proximal right or left femur. Chronic fractures of the superior and inferior pubic rami and sacrum.   5. Spondylotic changes of the cervical, thoracic, and lumbar spine. No high-grade cervical or thoracic spinal canal stenosis. There is high-grade L4-5 lumbar spinal canal stenosis unchanged from prior study due to anterolisthesis of L4 on L5 associated with severe right foraminal stenosis.   6. Bilateral nonobstructing renal calculi and bladder calculi.   MACRO: None.   Signed by: Caio Jasmine 4/16/2025 11:23 PM Dictation workstation:   [id number]    CT lumbar spine wo IV  contrast  Result Date: 4/16/2025  1. No acute intracranial abnormality or calvarial fracture.   2. No acute fracture or traumatic malalignment of the cervical, thoracic or lumbar spine.   3. Chronic compression fracture of T7 with 90% height loss and minimal retropulsion not resulting in canal stenosis.   4. No acute fracture of the pelvis, sacrum, or proximal right or left femur. Chronic fractures of the superior and inferior pubic rami and sacrum.   5. Spondylotic changes of the cervical, thoracic, and lumbar spine. No high-grade cervical or thoracic spinal canal stenosis. There is high-grade L4-5 lumbar spinal canal stenosis unchanged from prior study due to anterolisthesis of L4 on L5 associated with severe right foraminal stenosis.   6. Bilateral nonobstructing renal calculi and bladder calculi.   MACRO: None.   Signed by: Caio Jasmine 4/16/2025 11:23 PM Dictation workstation:   [id number]    CT pelvis wo IV contrast  Result Date: 4/16/2025  1. No acute intracranial abnormality or calvarial fracture.   2. No acute fracture or traumatic malalignment of the cervical, thoracic or lumbar spine.   3. Chronic compression fracture of T7 with 90% height loss and minimal retropulsion not resulting in canal stenosis.   4. No acute fracture of the pelvis, sacrum, or proximal right or left femur. Chronic fractures of the superior and inferior pubic rami and sacrum.   5. Spondylotic changes of the cervical, thoracic, and lumbar spine. No high-grade cervical or thoracic spinal canal stenosis. There is high-grade L4-5 lumbar spinal canal stenosis unchanged from prior study due to anterolisthesis of L4 on L5 associated with severe right foraminal stenosis.   6. Bilateral nonobstructing renal calculi and bladder calculi.   MACRO: None.   Signed by: Caio Jasmine 4/16/2025 11:23 PM Dictation workstation:   [id number]      Cardiology, Vascular, and Other Imaging  No other imaging results found for the past 2 days        PAST HISTORIES AND ALLERGIES     Past Medical History  She has a past medical history of Contact with and (suspected) exposure to covid-19 (11/26/2022), Fall in home (11/26/2022), and Impacted cerumen (04/18/2024).    Surgical History  She has a past surgical history that includes Other surgical history (12/10/2019) and Other surgical history (12/10/2019).     Social History  She reports that she has never smoked. She has never been exposed to tobacco smoke. She has never used smokeless tobacco. She reports that she does not drink alcohol and does not use drugs.    Family History  Family History[1]    Allergies  Lisinopril    MEDICATIONS     Scheduled Medications:  Scheduled Medications[2]  Continuous Medications:  Continuous Medications[3]  PRN Medications:  PRN Medications[4]     REVIEW OF SYSTEMS     Review of Systems   Constitutional:  Negative for activity change, appetite change, chills, diaphoresis, fatigue and fever.   HENT:  Negative for congestion, ear pain, rhinorrhea, sinus pain and sore throat.    Respiratory:  Negative for apnea, cough, chest tightness, shortness of breath, wheezing and stridor.    Cardiovascular:  Negative for chest pain, palpitations and leg swelling.   Gastrointestinal:  Negative for abdominal distention, abdominal pain, constipation, diarrhea, nausea and vomiting.   Genitourinary:  Negative for difficulty urinating, dysuria, flank pain, frequency, hematuria and urgency.   Musculoskeletal:  Negative for arthralgias, back pain, gait problem, joint swelling and myalgias.   Skin:  Negative for color change, pallor, rash and wound.   Neurological:  Positive for dizziness and light-headedness. Negative for tremors, seizures, syncope, facial asymmetry, speech difficulty, weakness, numbness and headaches.   Psychiatric/Behavioral:  Negative for agitation, behavioral problems, confusion and decreased concentration. The patient is not nervous/anxious.    All other systems reviewed and  are negative.      OBJECTIVE     Last Recorded Vitals  /82   Pulse 79   Temp 36.8 °C (98.2 °F)   Resp 20   Wt 49.9 kg (110 lb)   SpO2 97%      Physical Exam:  Vital signs and nursing notes reviewed.   Constitutional: Pleasant and cooperative. Laying in bed in no acute distress. Conversant.   Skin: Warm and dry; no obvious lesions, rashes, pallor, or jaundice.   Eyes: EOMI. Anicteric sclera.   ENT: Mucous membranes moist; no obvious injury or deformity appreciated.   Head and Neck: Normocephalic, atraumatic. ROM preserved. Trachea midline. No appreciable JVD.   Respiratory: Nonlabored on RA. Lungs clear to auscultation bilaterally without obvious adventitious sounds. Chest rise is equal.  Cardiovascular: RRR. No gross murmur, gallop, or rub. Extremities are warm and well-perfused with good capillary refill (< 3 seconds). No chest wall tenderness.   GI: Abdomen soft, nontender, nondistended. No obvious organomegaly appreciated. Bowel sounds are present.  : No CVA tenderness.   MSK: No gross abnormalities appreciated. No limitations to AROM/PROM appreciated.   Extremities: No cyanosis, edema, or clubbing evident. Neurovascularly intact.   Neuro: A&Ox3. CN 2-12 grossly intact. Able to respond to questions appropriately and clearly. No acute focal neurologic deficits appreciated.  Psych: Appropriate mood and behavior.    ASSESSMENT AND PLAN   Assessment/Plan     89 y.o. female with PMHx s/f cerebral hemorrhage, SVT, afib (not on anticoagulation due to pt choice), Hfpef, HTN, HLD, hypothyroidism, CKD presenting with fall and lightheadedness.    Plan:  Admit to SDU:    Fall/fractures:  CT imaging showing chronic T7 fracture, chronic fractures of the superior and inferior pubic rami and sacrum  PT/OT/SS consulted for possible SNF  Gentle hydration overnight followed by orthostatics in the AM.  Also check creatine kinase given how long she was down for.  UA pending, may have UTI.    Lightheadedness/Elevated  troponins:  Trops 58 -> 64, recheck with  AM labs  No ACS symptoms currently. EKG benign. Check TTE in AM.    HTN/HLD, Hfpef:  Continue home ASA, Coreg, Aldactone    Hypothyroidism:  Continue home Synthroid    CKD3:  Serum cr 1.55, on admission, around baseline.    DM2:  SSI ordered while inpatient  Accuchecks, hypoglycemic protocol.    Diet: Cardiac  DVT Prophylaxis: [facility]  Code Status: DNR and No Intubation   Case Discussed With: ED provider  Additional Sources Reviewed: ED note day of admission; past PCP notes    Anticipated Length of Stay (LOS): Patient will require two-plus midnight stay for further evaluation and management of the above.      aCio Montalvo DO    Dragon dictation software was used to dictate this note and thus there may be minor errors in translation/transcription including garbled speech or misspellings. Please contact for clarification if needed.       [1] No family history on file.  [2] aspirin, 324 mg, oral, Once  carvedilol, 25 mg, oral, BID  heparin (porcine), 5,000 Units, subcutaneous, q8h RENA  levothyroxine, 50 mcg, oral, Daily  pantoprazole, 40 mg, oral, Daily   Or  pantoprazole, 40 mg, intravenous, Daily  PARoxetine, 20 mg, oral, Daily  spironolactone, 12.5 mg, oral, Daily     [3] sodium chloride 0.9%, 75 mL/hr     [4] PRN medications: bisacodyl, guaiFENesin, melatonin, ondansetron **OR** ondansetron

## 2025-04-17 NOTE — PROGRESS NOTES
Gladys Salas is a 89 y.o. female on day 0 of admission presenting with Fall, initial encounter.    Review of Systems   Constitutional:  Negative for chills, diaphoresis, fatigue and fever.   HENT:  Negative for congestion, facial swelling, sneezing and sore throat.    Respiratory:  Negative for cough, choking, chest tightness, shortness of breath and wheezing.    Cardiovascular:  Negative for chest pain, palpitations and leg swelling.   Gastrointestinal:  Negative for abdominal pain, constipation, diarrhea, nausea and vomiting.   Genitourinary:  Negative for dysuria, hematuria and urgency.   Musculoskeletal:  Negative for back pain, gait problem, joint swelling and neck pain.   Skin:  Negative for rash and wound.   Neurological:  Positive for dizziness and light-headedness. Negative for syncope and weakness.   Psychiatric/Behavioral:  Negative for agitation, confusion and hallucinations. The patient is not nervous/anxious.    All other systems reviewed and are negative.     Subjective   Gladys Salas is a 89 y.o. female with PMHx s/f cerebral hemorrhage, SVT, afib (not on anticoagulation due to pt choice), Hfpef, HTN, HLD, hypothyroidism, CKD presenting with fall and lightheadedness. Pt suffered a fall at her home today when she was reaching under the kitchen sink to get something. She became acutely lightheaded and fell to the floor on her tailbone. Had headache and tailbone pain, but no LOC. It took her about two hours to reach a phone to call for help. Pt denies any further falls, but admits to lightheaded episodes when standing up almost every day. Symptoms have been intermittent, but have acutely worsened. She describes it as a spinning sensation accompanied by pre-syncope. Her PCP took her off of Jardiance last month because pt felt it was causing these lightheaded symptoms. No chest pain, nausea, vomiting, palpitations, vision changes, abdominal pain, urinary complaints or other symptoms with this. She  is taking her medications as prescribed.     4/17: Patient seen.  Looks clinically very dry, dry skin, dry mucous membranes.  Will DC Lasix and Aldactone for now.  Relax fluid restriction.  Check orthostatic vital signs.  Renal function also looks a little worse than January.  Will try to avoid IV boluses.  PT and OT evaluation and treatment.  Will likely need skilled nursing on discharge.  May have limited options for SNF per insurance.       Objective     Last Recorded Vitals  /81 (BP Location: Left arm, Patient Position: Sitting)   Pulse 70   Temp 36.6 °C (97.9 °F) (Tympanic)   Resp 18   Wt 49.9 kg (110 lb)   SpO2 100%   Intake/Output last 3 Shifts:    Intake/Output Summary (Last 24 hours) at 4/17/2025 0931  Last data filed at 4/17/2025 0214  Gross per 24 hour   Intake 1500 ml   Output --   Net 1500 ml       Admission Weight  Weight: 49.9 kg (110 lb) (04/16/25 1750)    Daily Weight  04/16/25 : 49.9 kg (110 lb)      Physical Exam  Constitutional:       General: She is not in acute distress.  HENT:      Head: Normocephalic and atraumatic.      Nose: Nose normal. No congestion or rhinorrhea.      Mouth/Throat:      Mouth: Mucous membranes are dry.      Pharynx: Oropharynx is clear.   Eyes:      General: No scleral icterus.     Extraocular Movements: Extraocular movements intact.      Pupils: Pupils are equal, round, and reactive to light.   Cardiovascular:      Rate and Rhythm: Normal rate and regular rhythm.      Heart sounds: Normal heart sounds. No murmur heard.     No friction rub. No gallop.   Pulmonary:      Effort: Pulmonary effort is normal.      Breath sounds: Normal breath sounds. No wheezing, rhonchi or rales.   Chest:      Chest wall: No tenderness.   Abdominal:      General: There is no distension.      Palpations: Abdomen is soft.      Tenderness: There is no abdominal tenderness. There is no guarding or rebound.   Musculoskeletal:         General: No swelling, tenderness or signs of injury.  Normal range of motion.      Cervical back: Normal range of motion.   Skin:     General: Skin is warm and dry.      Coloration: Skin is not jaundiced.      Findings: No bruising, erythema or rash.   Neurological:      General: No focal deficit present.      Mental Status: She is alert and oriented to person, place, and time.          Lab Results  Results for orders placed or performed during the hospital encounter of 04/16/25 (from the past 24 hours)   CBC and Auto Differential   Result Value Ref Range    WBC 10.0 4.4 - 11.3 x10*3/uL    nRBC 0.0 0.0 - 0.0 /100 WBCs    RBC 5.53 (H) 4.00 - 5.20 x10*6/uL    Hemoglobin 16.2 (H) 12.0 - 16.0 g/dL    Hematocrit 48.8 (H) 36.0 - 46.0 %    MCV 88 80 - 100 fL    MCH 29.3 26.0 - 34.0 pg    MCHC 33.2 32.0 - 36.0 g/dL    RDW 13.2 11.5 - 14.5 %    Platelets 157 150 - 450 x10*3/uL    Neutrophils % 84.3 40.0 - 80.0 %    Immature Granulocytes %, Automated 0.2 0.0 - 0.9 %    Lymphocytes % 12.4 13.0 - 44.0 %    Monocytes % 2.7 2.0 - 10.0 %    Eosinophils % 0.2 0.0 - 6.0 %    Basophils % 0.2 0.0 - 2.0 %    Neutrophils Absolute 8.45 (H) 1.60 - 5.50 x10*3/uL    Immature Granulocytes Absolute, Automated 0.02 0.00 - 0.50 x10*3/uL    Lymphocytes Absolute 1.24 0.80 - 3.00 x10*3/uL    Monocytes Absolute 0.27 0.05 - 0.80 x10*3/uL    Eosinophils Absolute 0.02 0.00 - 0.40 x10*3/uL    Basophils Absolute 0.02 0.00 - 0.10 x10*3/uL   Comprehensive metabolic panel   Result Value Ref Range    Glucose 124 (H) 74 - 99 mg/dL    Sodium 136 136 - 145 mmol/L    Potassium 4.0 3.5 - 5.3 mmol/L    Chloride 97 (L) 98 - 107 mmol/L    Bicarbonate 28 21 - 32 mmol/L    Anion Gap 15 10 - 20 mmol/L    Urea Nitrogen 55 (H) 6 - 23 mg/dL    Creatinine 1.55 (H) 0.50 - 1.05 mg/dL    eGFR 31 (L) >60 mL/min/1.73m*2    Calcium 9.9 8.6 - 10.3 mg/dL    Albumin 4.3 3.4 - 5.0 g/dL    Alkaline Phosphatase 38 33 - 136 U/L    Total Protein 7.2 6.4 - 8.2 g/dL    AST 20 9 - 39 U/L    Bilirubin, Total 1.1 0.0 - 1.2 mg/dL    ALT 9 7 -  45 U/L   Magnesium   Result Value Ref Range    Magnesium 2.02 1.60 - 2.40 mg/dL   B-Type Natriuretic Peptide   Result Value Ref Range     (H) 0 - 99 pg/mL   Blood Gas Venous Full Panel   Result Value Ref Range    POCT pH, Venous 7.43 7.33 - 7.43 pH    POCT pCO2, Venous 45 41 - 51 mm Hg    POCT pO2, Venous 44 35 - 45 mm Hg    POCT SO2, Venous 73 45 - 75 %    POCT Oxy Hemoglobin, Venous 71.3 45.0 - 75.0 %    POCT Hematocrit Calculated, Venous 51.0 (H) 36.0 - 46.0 %    POCT Sodium, Venous 131 (L) 136 - 145 mmol/L    POCT Potassium, Venous 3.9 3.5 - 5.3 mmol/L    POCT Chloride, Venous 96 (L) 98 - 107 mmol/L    POCT Ionized Calicum, Venous 1.23 1.10 - 1.33 mmol/L    POCT Glucose, Venous 131 (H) 74 - 99 mg/dL    POCT Lactate, Venous 2.1 (H) 0.4 - 2.0 mmol/L    POCT Base Excess, Venous 4.6 (H) -2.0 - 3.0 mmol/L    POCT HCO3 Calculated, Venous 29.9 (H) 22.0 - 26.0 mmol/L    POCT Hemoglobin, Venous 16.9 (H) 12.0 - 16.0 g/dL    POCT Anion Gap, Venous 9.0 (L) 10.0 - 25.0 mmol/L    Patient Temperature 37.0 degrees Celsius    FiO2 21 %   Troponin I, High Sensitivity, Initial   Result Value Ref Range    Troponin I, High Sensitivity 58 (HH) 0 - 13 ng/L   ECG 12 lead   Result Value Ref Range    Ventricular Rate 89 BPM    Atrial Rate 108 BPM    NE Interval 58 ms    QRS Duration 83 ms    QT Interval 395 ms    QTC Calculation(Bazett) 481 ms    R Axis 50 degrees    T Axis 45 degrees    QRS Count 14 beats    Q Onset 252 ms    T Offset 450 ms    QTC Fredericia 450 ms   Troponin, High Sensitivity, 1 Hour   Result Value Ref Range    Troponin I, High Sensitivity 65 (HH) 0 - 13 ng/L   Creatine Kinase   Result Value Ref Range    Creatine Kinase 201 0 - 215 U/L   Blood Gas Lactic Acid, Venous   Result Value Ref Range    POCT Lactate, Venous 1.5 0.4 - 2.0 mmol/L   POCT GLUCOSE   Result Value Ref Range    POCT Glucose 111 (H) 74 - 99 mg/dL   CBC   Result Value Ref Range    WBC 5.5 4.4 - 11.3 x10*3/uL    nRBC 0.0 0.0 - 0.0 /100 WBCs    RBC  4.60 4.00 - 5.20 x10*6/uL    Hemoglobin 13.7 12.0 - 16.0 g/dL    Hematocrit 41.5 36.0 - 46.0 %    MCV 90 80 - 100 fL    MCH 29.8 26.0 - 34.0 pg    MCHC 33.0 32.0 - 36.0 g/dL    RDW 13.5 11.5 - 14.5 %    Platelets 131 (L) 150 - 450 x10*3/uL   Basic metabolic panel   Result Value Ref Range    Glucose 113 (H) 74 - 99 mg/dL    Sodium 138 136 - 145 mmol/L    Potassium 4.6 3.5 - 5.3 mmol/L    Chloride 102 98 - 107 mmol/L    Bicarbonate 27 21 - 32 mmol/L    Anion Gap 14 10 - 20 mmol/L    Urea Nitrogen 50 (H) 6 - 23 mg/dL    Creatinine 1.48 (H) 0.50 - 1.05 mg/dL    eGFR 32 (L) >60 mL/min/1.73m*2    Calcium 7.9 (L) 8.6 - 10.3 mg/dL   Troponin I, High Sensitivity   Result Value Ref Range    Troponin I, High Sensitivity 66 (HH) 0 - 13 ng/L   Urinalysis with Reflex Culture and Microscopic   Result Value Ref Range    Color, Urine Light-Yellow Light-Yellow, Yellow, Dark-Yellow    Appearance, Urine Clear Clear    Specific Gravity, Urine 1.011 1.005 - 1.035    pH, Urine 6.0 5.0, 5.5, 6.0, 6.5, 7.0, 7.5, 8.0    Protein, Urine NEGATIVE NEGATIVE, 10 (TRACE), 20 (TRACE) mg/dL    Glucose, Urine Normal Normal mg/dL    Blood, Urine NEGATIVE NEGATIVE mg/dL    Ketones, Urine NEGATIVE NEGATIVE mg/dL    Bilirubin, Urine NEGATIVE NEGATIVE mg/dL    Urobilinogen, Urine Normal Normal mg/dL    Nitrite, Urine NEGATIVE NEGATIVE    Leukocyte Esterase, Urine NEGATIVE NEGATIVE        Image Results  ECG 12 lead  Atrial fibrillation  Borderline low voltage, extremity leads  Borderline ST depression, inferior leads  Borderline prolonged QT interval       Assessment/Plan     Assessment & Plan  Fall, initial encounter  CT imaging showing chronic T7 fracture, chronic fractures of the superior and inferior pubic rami and sacrum  PT/OT/SS consulted for possible SNF  Gentle hydration overnight followed by orthostatics in the AM.  Also check creatine kinase given how long she was down for.  UA pending, may have UTI.  4/17: Suspect orthostatic hypotension  secondary to dehydration.  Continue PT and OT.  Will relax fluid restriction.  Continue to hold Lasix and Aldactone.  No evidence of rhabdomyolysis.  Will likely need skilled nursing on discharge.  Trend BMP.  Hypothyroidism    Stage 3 chronic kidney disease, unspecified whether stage 3a or 3b CKD (Multi)    (HFpEF) heart failure with preserved ejection fraction  Continue home meds.  Update echocardiogram  4/17: LVEF was 66%.  Stage II diastolic dysfunction.  Suspect patient is dry at present.  Hold Lasix and Aldactone.  Relax fluid restriction.  Type 2 diabetes mellitus  SSI ordered while inpatient  Accuchecks, hypoglycemic protocol  Elevated troponin  Trops 58 -> 64, recheck with  AM labs  No ACS symptoms currently. EKG benign. Check TTE in AM.  4/17: Normal LVEF.  Stage II diastolic dysfunction.  Suspect troponin elevation is secondary to demand ischemia related to her fall and injury.          Manuel Hsu MD

## 2025-04-17 NOTE — ASSESSMENT & PLAN NOTE
CT imaging showing chronic T7 fracture, chronic fractures of the superior and inferior pubic rami and sacrum  PT/OT/SS consulted for possible SNF  Gentle hydration overnight followed by orthostatics in the AM.  Also check creatine kinase given how long she was down for.  UA pending, may have UTI.  4/17: Suspect orthostatic hypotension secondary to dehydration.  Continue PT and OT.  Will relax fluid restriction.  Continue to hold Lasix and Aldactone.  No evidence of rhabdomyolysis.  Will likely need skilled nursing on discharge.  Trend BMP.

## 2025-04-17 NOTE — PROGRESS NOTES
Gladys Salas is a 89 y.o. female admitted for Fall, initial encounter. Pharmacy reviewed the patient's [id number] medications and allergies for accuracy.    The list below reflects the PTA list prior to pharmacy medication history. A summary a changes to the PTA medication list has been listed below. Please review each medication in order reconciliation for additional clarification and justification.    Source of information: T2P    Medications added:    Medications modified:    Medications to be removed:  Denosumab 60mg/ml syringe     Medications of concern:      Prior to Admission Medications   Prescriptions Last Dose Informant Patient Reported? Taking?   PARoxetine (Paxil) 20 mg tablet   No No   Sig: Take 1 tablet (20 mg) by mouth once daily.   carvedilol (Coreg) 25 mg tablet   No No   Sig: TAKE 1 TABLET TWICE A DAY   denosumab (Prolia) 60 mg/mL syringe   No No   Sig: Inject 1 mL (60 mg total) under the skin 1 time for 1 dose.   Patient not taking: Reported on 2/5/2025   furosemide (Lasix) 40 mg tablet   No No   Sig: Take 1 tablet (40 mg) by mouth once daily.   levothyroxine (Synthroid, Levoxyl) 50 mcg tablet   No No   Sig: TAKE 1 TABLET DAILY IN THE MORNING BEFORE A MEAL   spironolactone (Aldactone) 25 mg tablet   No No   Sig: Take 0.5 tablets (12.5 mg) by mouth once daily.      Facility-Administered Medications: None       NAHOMI BAILEY

## 2025-04-17 NOTE — CARE PLAN
Problem: Mobility  Goal: STG - Patient will ambulate  Description: FWW MIN A X2, 50 FT  Outcome: Not Progressing  Goal: Goal 1  Description: 20 REPS RROM INCREASING STRENGTH TO STABILIZE GAIT  Outcome: Not Progressing     Problem: PT Transfers  Goal: STG - Transfer from bed to chair  Description: FWW MIN A X1  Outcome: Not Progressing  Goal: STG - Patient will transfer sit to and from stand  Description: MIN  A X1 FWW USING PROPER TECHNIQUE  Outcome: Not Progressing

## 2025-04-17 NOTE — ASSESSMENT & PLAN NOTE
Trops 58 -> 64, recheck with  AM labs  No ACS symptoms currently. EKG benign. Check TTE in AM.  4/17: Normal LVEF.  Stage II diastolic dysfunction.  Suspect troponin elevation is secondary to demand ischemia related to her fall and injury.

## 2025-04-17 NOTE — PROGRESS NOTES
Physical Therapy    Physical Therapy Evaluation    Patient Name: Gladys Salas  MRN: [id number]  Department: [address]  Room: 2017/2017-A  Today's Date: 4/17/2025   Time Calculation  Start Time: 1323  Stop Time: 1348  Time Calculation (min): 25 min    Assessment/Plan   PT Assessment  PT Assessment Results: Decreased strength, Decreased endurance, Impaired balance, Decreased mobility, Decreased safety awareness, Impaired hearing (VERTIGO)  Rehab Prognosis: Fair  Barriers to Discharge Home: Caregiver assistance, Cognition needs, Physical needs  Caregiver Assistance: Patient lives alone and/or does not have reliable caregiver assistance  Cognition Needs: 24hr supervision for safety awareness needed, Medication and/or medical management daily assist needed, Recollection or understanding of precautions/restrictions limited, Recollection or understanding of home exercise program limited, Insight of patient limited regarding functional ability/needs, Cognition-related high falls risk  Physical Needs: Stair navigation into home limited by function/safety, In-home setup navigation limited by function/safety, 24hr mobility assistance needed, 24hr ADL assistance needed, High falls risk due to function or environment  Evaluation/Treatment Tolerance: Patient limited by fatigue (DIZZINESS)  End of Session Communication: Bedside nurse (MOBILITY STATUS)  Assessment Comment: 2 A FOR BED M OBITILY AND TO STAND/TAKE A FEW STEPS AT EOB ARM IN ARM, LIGHTHEADED, HIGH FALLRISK, ? NYSTAGMUS- DIFFICULT TO SEE 2/2 BLINKING, RECOMMEND MOD REHAB, WOULD BENEFIT FROM AL  End of Session Patient Position: Bed, 3 rail up, Alarm on (CALL LIGHT IN REACH)  IP OR SWING BED PT PLAN  Inpatient or Swing Bed: Inpatient  PT Plan  Treatment/Interventions: Bed mobility, Transfer training, Gait training, Strengthening, Endurance training  PT Plan: Ongoing PT  PT Frequency: 3 times per week  PT Discharge Recommendations: Moderate intensity level of continued  care  Equipment Recommended upon Discharge:  (TBD)  PT Recommended Transfer Status: Assist x2 (FWW)  PT - OK to Discharge: Yes (WHENMEDICALLY CLEARED)    Subjective   General Visit Information:  General  Reason for Referral: IMPAIRED MOBILTIY  Referred By: RADHA  Past Medical History Relevant to Rehab: FELL HIT HEAD, C/O TAILBONE PAIN & HA,LIGHTHEADED FOR A COUPLE WEEKS; DX: BACKPAIN, DEMAND ISCHEMIA, DUNIA, FALL, LIGHTHEADED; HX: TRAUMATIC CEREBRALHEMORRHAGE, SVT, AFIB, HF, HTN, RENAL INSUFFICIANCY, OSTEOPOROSIS, B12/VIT D DEFICIANCY, HYPOTHYROOID, HUMERAL FX, ANXIETY, DM  Co-Treatment: OT  Co-Treatment Reason: FACILITATE MOBILTIY SAFETY  Prior to Session Communication: Bedside nurse (OK FOR THERAPY)  Patient Position Received: Bed, 3 rail up, Alarm off, not on at start of session (ROOM 15 IN ED, ALERT IV AGREES TO TRY THERAPY)  General Comment: STATES [facility] HAS BEENLIGHTHEADED FOR A FEW WEEKS,  STATES [facility] HAS HAS VERTIGO IN THE PAST  Home Living:  Home Living  Home Living Comments: ALONE 1 LEVEL HOME, 2 STEP/BREEZEWAY/1 STEP TO ENTER HOME, ROLLATOR OUTSIDE, FW INSIDE, A W/SHOWER- WALK INSHOWER/SEAT/BARS, A FROM DTR, ARMS AROUND TOILET, DTR DOESCHORES, PT. DOES MICROWAVEMEALS, DTR DRIVES TO APPTS  Prior Level of Function:     Precautions:  Precautions  Hearing/Visual Limitations: [place]  Medical Precautions: Fall precautions               Objective   Pain:  Pain Assessment  0-10 (Numeric) Pain Score:  (C/O TAILBONE PAIN W/ MOBILITY- NO RATING GIVEN)  Cognition:  Cognition  Overall Cognitive Status: Within Functional Limits  Arousal/Alertness: Appropriate responses to stimuli  Orientation Level: Oriented X4  Following Commands: Follows one step commands without difficulty  Safety/Judgement: Exceptions to WFL  Complex Functional Tasks: Minimal  Novel Situations: Minimal  Routine Tasks: Minimal    General Assessments:  General Observation  General Observation: ? R NYSTAGMUS W/ OCCASIONAL VERTICLE NYSTAGMUS? INCONSISTENT                Activity Tolerance  Endurance: Decreased tolerance for upright activites    Sensation  Light Touch: No apparent deficits    Strength  Strength Comments: ROM LEGS WFL, STRENGTHIN LEGS 3-/5  Strength  Strength Comments: ROM LEGS WFL, STRENGTHIN LEGS 3-/5                     Static Sitting Balance  Static Sitting-Comment/Number of Minutes: FAIR  Dynamic Sitting Balance  Dynamic Sitting-Comments: FAIR-    Static Standing Balance  Static Standing-Comment/Number of Minutes: FAIR-  Dynamic Standing Balance  Dynamic Standing-Comments: FAIR-  Functional Assessments:  Bed Mobility  Bed Mobility:  (SIT>STAND MIN A X2, SAT EOB CGA 5M IN, SIT>STAND MOD X2 A)    Transfers  Transfer:  (SIT<>STAND MOD X2 ARM IN ARM, LEGS UNSTEADY LIGHTHEADED)    Ambulation/Gait Training  Ambulation/Gait Training Performed:  (MOD X2 ARM IN ARM, LIGHTHEADED W/ MOBILTIY, 2 FT TO R TOWARDS HOB, FATIGUED W/ACTIVITY)  Extremity/Trunk Assessments:  Cervical Spine   Cervical Spine:  (MILD FORWARD HEAD, ROUNDED SHOULDERS)  Outcome Measures:  [facility] Basic Mobility  Turning from your back to your side while in a flat bed without using bedrails: A little  Moving from lying on your back to sitting on the side of a flat bed without using bedrails: A lot  Moving to and from bed to chair (including a wheelchair): A lot  Standing up from a chair using your arms (e.g. wheelchair or bedside chair): A lot  To walk in hospital room: Total  Climbing 3-5 steps with railing: Total  Basic Mobility - Total Score: 11    Encounter Problems       Encounter Problems (Active)       Mobility       STG - Patient will ambulate (Not Progressing)       Start:  04/17/25    Expected End:  04/28/25       FWW MIN A X2, 50 FT         Goal 1 (Not Progressing)       Start:  04/17/25    Expected End:  05/08/25       20 REPS RROM INCREASING STRENGTH TO STABILIZE GAIT            PT Transfers       STG - Transfer from bed to chair (Not Progressing)       Start:  04/17/25    Expected  End:  04/21/25       FWW MIN A X1         STG - Patient will transfer sit to and from stand (Not Progressing)       Start:  04/17/25    Expected End:  04/19/25       MIN  A X1 FWW USING PROPER TECHNIQUE            Pain - Adult              Education Documentation  Mobility Training, taught by Uyen Huang, PT at 4/17/2025  3:02 PM.  Learner: Patient  Readiness: Acceptance  Method: Explanation  Response: Needs Reinforcement  Comment: MOBILTIY SAFETY    Education Comments  No comments found.

## 2025-04-17 NOTE — ED PROVIDER NOTES
HPI   Chief Complaint   Patient presents with    Fall    Tailbone Pain    Headache       HPI: The patient is a 89-year-old female, she has a history of a traumatic cerebral hemorrhage, paroxysmal SVT, A-fib, heart failure with preserved ejection fraction, she is not on anticoagulation, hypertension, renal insufficiency, hypothyroidism, hyperlipidemia, osteoporosis, anxiety, vitamin B12 and D deficiency, type 2 diabetes, she is presenting to the emergency department for multiple concerns.  Patient's first concern that she had a fall today, in the kitchen, where she fell backwards striking her head, no loss of consciousness.  She is having head back tailbone pain.  Patient also reports she has been feeling lightheaded for several months.  Progressively getting worse.  Lightheadedness is worse with ambulation and worse with standing.  Patient recently had medications adjusted where she was taken off of her Jardiance.  She reports some decrease in appetite as well recently.  She is currently denying any lightheadedness while laying down, no chest pain or abdominal pain, denies any blurry or double vision, denies any vertiginous dizziness associated with the lightheadedness.      ROS: Complete 12 point review of systems performed, otherwise negative except as noted in the history of present illness    PMH: Reviewed, documented below in note. Pertinents in HPI  PSH: Reviewed and documented below in note. Pertinents in HPI  SH: No tobacco alcohol or illicits. Lives home alone  Fam: Reviewed, noncontributory to patients current complaint  MEDS: Reviewed and documented below in note. Pertinents in HPI  ALLERGIES: Reviewed and documented below in note.              History provided by:  Patient, relative and medical records   used: No                          Bush Coma Scale Score: 15                  Patient History   Medical History[1]  Surgical History[2]  Family History[3]  Social  History[4]    Physical Exam   Visit Vitals  /73   Pulse 86   Temp 36.8 °C (98.2 °F)   Resp 16   Ht 1.524 m (5')   Wt 49.9 kg (110 lb)   SpO2 99%   BMI 21.48 kg/m²   OB Status Postmenopausal   Smoking Status Never   BSA 1.45 m²      Physical Exam  Vitals and nursing note reviewed.   Constitutional:       Appearance: Normal appearance.   HENT:      Head:      Comments: Traumatic with a cephalohematoma to the crown of the head  Neck:      Vascular: No carotid bruit.   Cardiovascular:      Rate and Rhythm: Normal rate. Rhythm irregular.      Pulses: Normal pulses.      Heart sounds: Normal heart sounds.   Pulmonary:      Effort: Pulmonary effort is normal.      Breath sounds: Normal breath sounds.   Abdominal:      General: There is no distension.      Palpations: Abdomen is soft.      Tenderness: There is no abdominal tenderness. There is no right CVA tenderness, left CVA tenderness, guarding or rebound.   Musculoskeletal:         General: Tenderness present. No deformity or signs of injury.      Cervical back: Normal range of motion. No rigidity.      Comments: Patient has tenderness to palpation to the thoracic lumbar and sacral spines.  No step-offs or deformities.  No bruising or lacerations.   Skin:     General: Skin is warm and dry.      Capillary Refill: Capillary refill takes less than 2 seconds.   Neurological:      General: No focal deficit present.      Mental Status: She is alert and oriented to person, place, and time.      Sensory: No sensory deficit.      Motor: Weakness present.   Psychiatric:         Mood and Affect: Mood normal.         Behavior: Behavior normal.         CT head wo IV contrast    (Results Pending)   CT cervical spine wo IV contrast    (Results Pending)   CT thoracic spine wo IV contrast    (Results Pending)   CT lumbar spine wo IV contrast    (Results Pending)   CT pelvis wo IV contrast    (Results Pending)       Labs Reviewed   CBC WITH AUTO DIFFERENTIAL   COMPREHENSIVE  METABOLIC PANEL   MAGNESIUM   TROPONIN SERIES- (INITIAL, 1 HR)    Narrative:     The following orders were created for panel order Troponin I Series, High Sensitivity (0, 1 HR).  Procedure                               Abnormality         Status                     ---------                               -----------         ------                     Troponin I, High Sensiti...[[id number]]                                                   Please view results for these tests on the individual orders.   B-TYPE NATRIURETIC PEPTIDE   BLOOD GAS VENOUS FULL PANEL   URINALYSIS WITH REFLEX CULTURE AND MICROSCOPIC    Narrative:     The following orders were created for panel order Urinalysis with Reflex Culture and Microscopic.  Procedure                               Abnormality         Status                     ---------                               -----------         ------                     Urinalysis with Reflex C...[[id number]]                                                 Extra Urine Gray Tube[[id number]]                                                         Please view results for these tests on the individual orders.   SERIAL TROPONIN-INITIAL   URINALYSIS WITH REFLEX CULTURE AND MICROSCOPIC   EXTRA URINE GRAY TUBE         ED Course & MDM            Medical Decision Making         Your medication list        ASK your doctor about these medications        Instructions Last Dose Given Next Dose Due   carvedilol 25 mg tablet  Commonly known as: Coreg      TAKE 1 TABLET TWICE A DAY       furosemide 40 mg tablet  Commonly known as: Lasix      Take 1 tablet (40 mg) by mouth once daily.       levothyroxine 50 mcg tablet  Commonly known as: Synthroid, Levoxyl      TAKE 1 TABLET DAILY IN THE MORNING BEFORE A MEAL       PARoxetine 20 mg tablet  Commonly known as: Paxil      Take 1 tablet (20 mg) by mouth once daily.       Prolia 60 mg/mL syringe  Generic drug: denosumab      Inject 1 mL (60 mg total) under the skin 1  time for 1 dose.       spironolactone 25 mg tablet  Commonly known as: Aldactone      Take 0.5 tablets (12.5 mg) by mouth once daily.                Procedure  Procedures     *This report was transcribed using voice recognition software.  Every effort was made to ensure accuracy; however, inadvertent computerized transcription errors may be present.*  Jero Alicea MD  04/16/25           [1]   Past Medical History:  Diagnosis Date    Contact with and (suspected) exposure to covid-19 11/26/2022    Fall in home 11/26/2022    Comment on above: FALL AT HOME 11AM    Impacted cerumen 04/18/2024   [2]   Past Surgical History:  Procedure Laterality Date    OTHER SURGICAL HISTORY  12/10/2019    Hysterectomy    OTHER SURGICAL HISTORY  12/10/2019    Lumpectomy   [3] No family history on file.  [4]   Social History  Tobacco Use    Smoking status: Never     Passive exposure: Never    Smokeless tobacco: Never   Vaping Use    Vaping status: Never Used   Substance Use Topics    Alcohol use: Never    Drug use: Never      nonobstructing renal calculi and bladder calculi.        MACRO:   None.        Signed by: Caio Jasmine 4/16/2025 11:23 PM   Dictation workstation:   [id number]      CT head wo IV contrast   Final Result   1. No acute intracranial abnormality or calvarial fracture.        2. No acute fracture or traumatic malalignment of the cervical,   thoracic or lumbar spine.        3. Chronic compression fracture of T7 with 90% height loss and   minimal retropulsion not resulting in canal stenosis.        4. No acute fracture of the pelvis, sacrum, or proximal right or left   femur. Chronic fractures of the superior and inferior pubic rami and   sacrum.        5. Spondylotic changes of the cervical, thoracic, and lumbar spine.   No high-grade cervical or thoracic spinal canal stenosis. There is   high-grade L4-5 lumbar spinal canal stenosis unchanged from prior   study due to anterolisthesis of L4 on L5 associated with severe right   foraminal stenosis.        6. Bilateral nonobstructing renal calculi and bladder calculi.        MACRO:   None.        Signed by: Caio Jasmine 4/16/2025 11:23 PM   Dictation workstation:   [id number]          Labs Reviewed   CBC WITH AUTO DIFFERENTIAL - Abnormal       Result Value    WBC 10.0      nRBC 0.0      RBC 5.53 (*)     Hemoglobin 16.2 (*)     Hematocrit 48.8 (*)     MCV 88      MCH 29.3      MCHC 33.2      RDW 13.2      Platelets 157      Neutrophils % 84.3      Immature Granulocytes %, Automated 0.2      Lymphocytes % 12.4      Monocytes % 2.7      Eosinophils % 0.2      Basophils % 0.2      Neutrophils Absolute 8.45 (*)     Immature Granulocytes Absolute, Automated 0.02      Lymphocytes Absolute 1.24      Monocytes Absolute 0.27      Eosinophils Absolute 0.02      Basophils Absolute 0.02     COMPREHENSIVE METABOLIC PANEL - Abnormal    Glucose 124 (*)     Sodium 136      Potassium 4.0      Chloride 97 (*)     Bicarbonate 28      Anion Gap 15      Urea Nitrogen 55 (*)      Creatinine 1.55 (*)     eGFR 31 (*)     Calcium 9.9      Albumin 4.3      Alkaline Phosphatase 38      Total Protein 7.2      AST 20      Bilirubin, Total 1.1      ALT 9     B-TYPE NATRIURETIC PEPTIDE - Abnormal     (*)     Narrative:        <100 pg/mL - Heart failure unlikely  100-299 pg/mL - Intermediate probability of acute heart                  failure exacerbation. Correlate with clinical                  context and patient history.    >=300 pg/mL - Heart Failure likely. Correlate with clinical                  context and patient history.    BNP testing is performed using different testing methodology at [facility] than at other [facility]. Direct result comparisons should only be made within the same method.      BLOOD GAS VENOUS FULL PANEL - Abnormal    POCT pH, Venous 7.43      POCT pCO2, Venous 45      POCT pO2, Venous 44      POCT SO2, Venous 73      POCT Oxy Hemoglobin, Venous 71.3      POCT Hematocrit Calculated, Venous 51.0 (*)     POCT Sodium, Venous 131 (*)     POCT Potassium, Venous 3.9      POCT Chloride, Venous 96 (*)     POCT Ionized Calicum, Venous 1.23      POCT Glucose, Venous 131 (*)     POCT Lactate, Venous 2.1 (*)     POCT Base Excess, Venous 4.6 (*)     POCT HCO3 Calculated, Venous 29.9 (*)     POCT Hemoglobin, Venous 16.9 (*)     POCT Anion Gap, Venous 9.0 (*)     Patient Temperature 37.0      FiO2 21     SERIAL TROPONIN-INITIAL - Abnormal    Troponin I, High Sensitivity 58 (*)     Narrative:     Less than 99th percentile of normal range cutoff-  Female and children under 18 years old <14 ng/L; Male <21 ng/L: Negative  Repeat testing should be performed if clinically indicated.     Female and children under 18 years old 14-50 ng/L; Male 21-50 ng/L:  Consistent with possible cardiac damage and possible increased clinical   risk. Serial measurements may help to assess extent of myocardial damage.     >50 ng/L: Consistent with cardiac damage, increased clinical  risk and  myocardial infarction. Serial measurements may help assess extent of   myocardial damage.      NOTE: Children less than 1 year old may have higher baseline troponin   levels and results should be interpreted in conjunction with the overall   clinical context.     NOTE: Troponin I testing is performed using a different   testing methodology at [facility] than at other   [facility]. Direct result comparisons should only   be made within the same method.   SERIAL TROPONIN, 1 HOUR - Abnormal    Troponin I, High Sensitivity 65 (*)     Narrative:     Less than 99th percentile of normal range cutoff-  Female and children under 18 years old <14 ng/L; Male <21 ng/L: Negative  Repeat testing should be performed if clinically indicated.     Female and children under 18 years old 14-50 ng/L; Male 21-50 ng/L:  Consistent with possible cardiac damage and possible increased clinical   risk. Serial measurements may help to assess extent of myocardial damage.     >50 ng/L: Consistent with cardiac damage, increased clinical risk and  myocardial infarction. Serial measurements may help assess extent of   myocardial damage.      NOTE: Children less than 1 year old may have higher baseline troponin   levels and results should be interpreted in conjunction with the overall   clinical context.     NOTE: Troponin I testing is performed using a different   testing methodology at [facility] than at other   [facility]. Direct result comparisons should only   be made within the same method.   CBC - Abnormal    WBC 5.5      nRBC 0.0      RBC 4.60      Hemoglobin 13.7      Hematocrit 41.5      MCV 90      MCH 29.8      MCHC 33.0      RDW 13.5      Platelets 131 (*)    BASIC METABOLIC PANEL - Abnormal    Glucose 113 (*)     Sodium 138      Potassium 4.6      Chloride 102      Bicarbonate 27      Anion Gap 14      Urea Nitrogen 50 (*)     Creatinine 1.48 (*)     eGFR 32 (*)     Calcium 7.9 (*)     TROPONIN I, HIGH SENSITIVITY - Abnormal    Troponin I, High Sensitivity 66 (*)     Narrative:     Less than 99th percentile of normal range cutoff-  Female and children under 18 years old <14 ng/L; Male <21 ng/L: Negative  Repeat testing should be performed if clinically indicated.     Female and children under 18 years old 14-50 ng/L; Male 21-50 ng/L:  Consistent with possible cardiac damage and possible increased clinical   risk. Serial measurements may help to assess extent of myocardial damage.     >50 ng/L: Consistent with cardiac damage, increased clinical risk and  myocardial infarction. Serial measurements may help assess extent of   myocardial damage.      NOTE: Children less than 1 year old may have higher baseline troponin   levels and results should be interpreted in conjunction with the overall   clinical context.     NOTE: Troponin I testing is performed using a different   testing methodology at [facility] than at other   [facility]. Direct result comparisons should only   be made within the same method.   BASIC METABOLIC PANEL - Abnormal    Glucose 92      Sodium 138      Potassium 4.2      Chloride 106      Bicarbonate 28      Anion Gap 8 (*)     Urea Nitrogen 42 (*)     Creatinine 1.29 (*)     eGFR 38 (*)     Calcium 8.1 (*)    BASIC METABOLIC PANEL - Abnormal    Glucose 112 (*)     Sodium 139      Potassium 4.3      Chloride 106      Bicarbonate 26      Anion Gap 11      Urea Nitrogen 32 (*)     Creatinine 1.27 (*)     eGFR 39 (*)     Calcium 8.4 (*)    BASIC METABOLIC PANEL - Abnormal    Glucose 104 (*)     Sodium 141      Potassium 4.3      Chloride 107      Bicarbonate 27      Anion Gap 11      Urea Nitrogen 27 (*)     Creatinine 1.18 (*)     eGFR 42 (*)     Calcium 8.4 (*)    CBC - Abnormal    WBC 4.8      nRBC 0.0      RBC 4.44      Hemoglobin 13.1      Hematocrit 41.3      MCV 93      MCH 29.5      MCHC 31.7 (*)     RDW 13.7      Platelets 134 (*)    BASIC METABOLIC  PANEL - Abnormal    Glucose 115 (*)     Sodium 141      Potassium 4.4      Chloride 106      Bicarbonate 28      Anion Gap 11      Urea Nitrogen 30 (*)     Creatinine 1.06 (*)     eGFR 48 (*)     Calcium 8.8     CBC - Abnormal    WBC 14.3 (*)     nRBC 0.1 (*)     RBC 2.57 (*)     Hemoglobin 7.8 (*)     Hematocrit 24.3 (*)     MCV 95      MCH 30.4      MCHC 32.1      RDW 14.0      Platelets 171     BASIC METABOLIC PANEL - Abnormal    Glucose 230 (*)     Sodium 137      Potassium 6.2 (*)     Chloride 111 (*)     Bicarbonate 20 (*)     Anion Gap 12      Urea Nitrogen 36 (*)     Creatinine 1.85 (*)     eGFR 25 (*)     Calcium 7.2 (*)    LACTATE - Abnormal    Lactate 5.3 (*)     Narrative:     Venipuncture immediately after or during the administration of Metamizole may lead to falsely low results. Testing should be performed immediately prior to Metamizole dosing.   TROPONIN I, HIGH SENSITIVITY - Abnormal    Troponin I, High Sensitivity 20 (*)     Narrative:     Less than 99th percentile of normal range cutoff-  Female and children under 18 years old <14 ng/L; Male <21 ng/L: Negative  Repeat testing should be performed if clinically indicated.     Female and children under 18 years old 14-50 ng/L; Male 21-50 ng/L:  Consistent with possible cardiac damage and possible increased clinical   risk. Serial measurements may help to assess extent of myocardial damage.     >50 ng/L: Consistent with cardiac damage, increased clinical risk and  myocardial infarction. Serial measurements may help assess extent of   myocardial damage.      NOTE: Children less than 1 year old may have higher baseline troponin   levels and results should be interpreted in conjunction with the overall   clinical context.     NOTE: Troponin I testing is performed using a different   testing methodology at [facility] than at other   [facility]. Direct result comparisons should only   be made within the same method.   CBC - Abnormal     WBC 11.9 (*)     nRBC 0.5 (*)     RBC 2.06 (*)     Hemoglobin 6.4 (*)     Hematocrit 19.8 (*)     MCV 96      MCH 31.1      MCHC 32.3      RDW 13.9      Platelets 147 (*)    D-DIMER, VTE EXCLUSION - Abnormal    D-Dimer, Quantitative VTE Exclusion 1,642 (*)     Narrative:     The VTE Exclusion D-Dimer assay is reported in ng/mL Fibrinogen Equivalent Units (FEU).    Per 's instructions for use, a value of less than 500 ng/mL (FEU) may help to exclude DVT or PE in outpatients when the assay is used with a clinical pretest probability assessment.(AE must utilize and document eCalc 'Wells Score Deep Vein Thrombosis Risk' for DVT exclusion only. Emergency Department should utilize  Guidelines for Emergency Department Use of the VTE Exclusion D-Dimer and Clinical Pretest probability assessment model for DVT or PE exclusion.)   POCT GLUCOSE - Abnormal    POCT Glucose 111 (*)    POCT GLUCOSE - Abnormal    POCT Glucose 142 (*)    POCT GLUCOSE - Abnormal    POCT Glucose 108 (*)    POCT GLUCOSE - Abnormal    POCT Glucose 113 (*)    POCT GLUCOSE - Abnormal    POCT Glucose 100 (*)    POCT GLUCOSE - Abnormal    POCT Glucose 256 (*)    POCT GLUCOSE - Abnormal    POCT Glucose 59 (*)    POCT GLUCOSE - Abnormal    POCT Glucose 191 (*)    POCT GLUCOSE - Abnormal    POCT Glucose 247 (*)    POCT GLUCOSE - Abnormal    POCT Glucose 121 (*)    POCT GLUCOSE - Abnormal    POCT Glucose 228 (*)    POCT GLUCOSE - Abnormal    POCT Glucose 45 (*)    POCT GLUCOSE - Abnormal    POCT Glucose 118 (*)    POCT GLUCOSE - Abnormal    POCT Glucose 113 (*)    POCT GLUCOSE - Abnormal    POCT Glucose 115 (*)    POCT GLUCOSE - Abnormal    POCT Glucose 237 (*)    POCT GLUCOSE - Abnormal    POCT Glucose 157 (*)    POCT GLUCOSE - Abnormal    POCT Glucose 141 (*)    POCT GLUCOSE - Abnormal    POCT Glucose 124 (*)    POCT GLUCOSE - Abnormal    POCT Glucose 171 (*)    POCT GLUCOSE - Abnormal    POCT Glucose 286 (*)    POCT GLUCOSE - Abnormal    POCT  Glucose 205 (*)    BLOOD CULTURE - Normal    Blood Culture No growth at 3 days     MAGNESIUM - Normal    Magnesium 2.02     URINALYSIS WITH REFLEX CULTURE AND MICROSCOPIC - Normal    Color, Urine Light-Yellow      Appearance, Urine Clear      Specific Gravity, Urine 1.011      pH, Urine 6.0      Protein, Urine NEGATIVE      Glucose, Urine Normal      Blood, Urine NEGATIVE      Ketones, Urine NEGATIVE      Bilirubin, Urine NEGATIVE      Urobilinogen, Urine Normal      Nitrite, Urine NEGATIVE      Leukocyte Esterase, Urine NEGATIVE     BLOOD GAS LACTIC ACID, VENOUS - Normal    POCT Lactate, Venous 1.5     CREATINE KINASE - Normal    Creatine Kinase 201     TROPONIN SERIES- (INITIAL, 1 HR)    Narrative:     The following orders were created for panel order Troponin I Series, High Sensitivity (0, 1 HR).  Procedure                               Abnormality         Status                     ---------                               -----------         ------                     Troponin I, High Sensiti...[[id number]]  Abnormal            Final result               Troponin, High Sensitivi...[[id number]]  Abnormal            Final result                 Please view results for these tests on the individual orders.   URINALYSIS WITH REFLEX CULTURE AND MICROSCOPIC    Narrative:     The following orders were created for panel order Urinalysis with Reflex Culture and Microscopic.  Procedure                               Abnormality         Status                     ---------                               -----------         ------                     Urinalysis with Reflex C...[[id number]]  Normal              Final result               Extra Urine Gray Tube[[id number]]                            Final result                 Please view results for these tests on the individual orders.   EXTRA URINE GRAY TUBE    Extra Tube Hold for add-ons.     TYPE AND SCREEN    ABO TYPE A      Rh TYPE POS      ANTIBODY SCREEN NEG           ED  Course & MDM   ED Course as of 04/25/25 0640   u Apr 17, 2025   0137 EKG as interpreted by myself demonstrates atrial fibrillation with a rate of 89, normal axis, normal QRS and QTc interval, no evidence of an acute STEMI. [NS]      ED Course User Index  [NS] Jero Alicea MD         Diagnoses as of 04/25/25 0640   Fall, initial encounter   Acute midline back pain, unspecified back location   Demand ischemia (Multi)   Acute kidney injury   Lightheadedness           Medical Decision Making  All mentioned lab results, ECGs, and imaging were independently reviewed by myself  - Patient evaluated. Patient presented to the emergency department due to a fall with lightheadedness.  The fall appeared to be mechanical in nature however she was having lightheadedness prior to the fall.  Will rule out hypotension, orthostasis, dehydration, anemia, ACS as a potential cause for the patient's symptoms.  Patient had an IV established and workup was undertaken with labs as well as imaging.  Labs demonstrate elevated troponins without any ischemic changes on EKG concerning more for demand ischemia.  Patient did have initial lactate which cleared with fluid resuscitation.  And a mild acute kidney injury.  CTs are negative for any acute findings, she does have some chronic fractures noted.  I feel the patient would benefit from admission to the hospital to undergo continued workup and monitoring for her demand ischemia as well as PT OT and social work consultation with the fall.  Patient was admitted to medicine otherwise stable condition    - Monitored for any changes in stability or symptomatology. Patient remained stable.   - Counseled regarding labs, imaging, diagnosis, and plan. Patient was agreeable. All questions were answered. The patient was receptive and agreeable to the plan of care.       *Disclaimer: This note was dictated by speech recognition. Minor errors in transcription may be present. Please call with  questions.    Noam Alicea MD             Your medication list        ASK your doctor about these medications        Instructions Last Dose Given Next Dose Due   carvedilol 25 mg tablet  Commonly known as: Coreg      TAKE 1 TABLET TWICE A DAY       furosemide 40 mg tablet  Commonly known as: Lasix      Take 1 tablet (40 mg) by mouth once daily.       levothyroxine 50 mcg tablet  Commonly known as: Synthroid, Levoxyl      TAKE 1 TABLET DAILY IN THE MORNING BEFORE A MEAL       PARoxetine 20 mg tablet  Commonly known as: Paxil      Take 1 tablet (20 mg) by mouth once daily.       Prolia 60 mg/mL syringe  Generic drug: denosumab      Inject 1 mL (60 mg total) under the skin 1 time for 1 dose.       spironolactone 25 mg tablet  Commonly known as: Aldactone      Take 0.5 tablets (12.5 mg) by mouth once daily.                Procedure  Procedures     *This report was transcribed using voice recognition software.  Every effort was made to ensure accuracy; however, inadvertent computerized transcription errors may be present.*  Jero Alicea MD  04/25/25           [1]   Past Medical History:  Diagnosis Date    Contact with and (suspected) exposure to covid-19 11/26/2022    Fall in home 11/26/2022    Comment on above: FALL AT HOME 11AM    Impacted cerumen 04/18/2024   [2]   Past Surgical History:  Procedure Laterality Date    OTHER SURGICAL HISTORY  12/10/2019    Hysterectomy    OTHER SURGICAL HISTORY  12/10/2019    Lumpectomy   [3] No family history on file.  [4]   Social History  Tobacco Use    Smoking status: Never     Passive exposure: Never    Smokeless tobacco: Never   Vaping Use    Vaping status: Never Used   Substance Use Topics    Alcohol use: Never    Drug use: Never        Jero Alicea MD  04/25/25 [id number]

## 2025-04-18 LAB
ANION GAP SERPL CALC-SCNC: 8 MMOL/L (ref 10–20)
BUN SERPL-MCNC: 42 MG/DL (ref 6–23)
CALCIUM SERPL-MCNC: 8.1 MG/DL (ref 8.6–10.3)
CHLORIDE SERPL-SCNC: 106 MMOL/L (ref 98–107)
CO2 SERPL-SCNC: 28 MMOL/L (ref 21–32)
CREAT SERPL-MCNC: 1.29 MG/DL (ref 0.5–1.05)
EGFRCR SERPLBLD CKD-EPI 2021: 38 ML/MIN/1.73M*2
GLUCOSE BLD MANUAL STRIP-MCNC: 100 MG/DL (ref 74–99)
GLUCOSE BLD MANUAL STRIP-MCNC: 191 MG/DL (ref 74–99)
GLUCOSE BLD MANUAL STRIP-MCNC: 247 MG/DL (ref 74–99)
GLUCOSE BLD MANUAL STRIP-MCNC: 256 MG/DL (ref 74–99)
GLUCOSE BLD MANUAL STRIP-MCNC: 59 MG/DL (ref 74–99)
GLUCOSE SERPL-MCNC: 92 MG/DL (ref 74–99)
POTASSIUM SERPL-SCNC: 4.2 MMOL/L (ref 3.5–5.3)
SODIUM SERPL-SCNC: 138 MMOL/L (ref 136–145)

## 2025-04-18 PROCEDURE — 2500000001 HC RX 250 WO HCPCS SELF ADMINISTERED DRUGS (ALT 637 FOR MEDICARE OP): Performed by: STUDENT IN AN ORGANIZED HEALTH CARE EDUCATION/TRAINING PROGRAM

## 2025-04-18 PROCEDURE — 80048 BASIC METABOLIC PNL TOTAL CA: CPT | Performed by: INTERNAL MEDICINE

## 2025-04-18 PROCEDURE — 1200000002 HC GENERAL ROOM WITH TELEMETRY DAILY

## 2025-04-18 PROCEDURE — 97110 THERAPEUTIC EXERCISES: CPT | Mod: GP

## 2025-04-18 PROCEDURE — 36415 COLL VENOUS BLD VENIPUNCTURE: CPT | Performed by: INTERNAL MEDICINE

## 2025-04-18 PROCEDURE — 99233 SBSQ HOSP IP/OBS HIGH 50: CPT | Performed by: INTERNAL MEDICINE

## 2025-04-18 PROCEDURE — 2500000002 HC RX 250 W HCPCS SELF ADMINISTERED DRUGS (ALT 637 FOR MEDICARE OP, ALT 636 FOR OP/ED): Performed by: STUDENT IN AN ORGANIZED HEALTH CARE EDUCATION/TRAINING PROGRAM

## 2025-04-18 PROCEDURE — 82947 ASSAY GLUCOSE BLOOD QUANT: CPT

## 2025-04-18 PROCEDURE — 2500000002 HC RX 250 W HCPCS SELF ADMINISTERED DRUGS (ALT 637 FOR MEDICARE OP, ALT 636 FOR OP/ED): Performed by: INTERNAL MEDICINE

## 2025-04-18 PROCEDURE — 2500000004 HC RX 250 GENERAL PHARMACY W/ HCPCS (ALT 636 FOR OP/ED): Performed by: STUDENT IN AN ORGANIZED HEALTH CARE EDUCATION/TRAINING PROGRAM

## 2025-04-18 PROCEDURE — 97116 GAIT TRAINING THERAPY: CPT | Mod: GP

## 2025-04-18 RX ORDER — PAROXETINE 10 MG/1
20 TABLET, FILM COATED ORAL EVERY EVENING
Status: DISCONTINUED | OUTPATIENT
Start: 2025-04-18 | End: 2025-04-22 | Stop reason: HOSPADM

## 2025-04-18 RX ADMIN — PAROXETINE HYDROCHLORIDE 20 MG: 20 TABLET, FILM COATED ORAL at 21:13

## 2025-04-18 RX ADMIN — HEPARIN SODIUM 5000 UNITS: 5000 INJECTION, SOLUTION INTRAVENOUS; SUBCUTANEOUS at 21:13

## 2025-04-18 RX ADMIN — PANTOPRAZOLE SODIUM 40 MG: 40 TABLET, DELAYED RELEASE ORAL at 09:34

## 2025-04-18 RX ADMIN — INSULIN LISPRO 3 UNITS: 100 INJECTION, SOLUTION INTRAVENOUS; SUBCUTANEOUS at 12:29

## 2025-04-18 RX ADMIN — CARVEDILOL 25 MG: 25 TABLET, FILM COATED ORAL at 09:34

## 2025-04-18 RX ADMIN — HEPARIN SODIUM 5000 UNITS: 5000 INJECTION, SOLUTION INTRAVENOUS; SUBCUTANEOUS at 04:36

## 2025-04-18 RX ADMIN — CARVEDILOL 25 MG: 25 TABLET, FILM COATED ORAL at 21:13

## 2025-04-18 RX ADMIN — HEPARIN SODIUM 5000 UNITS: 5000 INJECTION, SOLUTION INTRAVENOUS; SUBCUTANEOUS at 15:05

## 2025-04-18 RX ADMIN — LEVOTHYROXINE SODIUM 50 MCG: 0.05 TABLET ORAL at 04:36

## 2025-04-18 ASSESSMENT — ENCOUNTER SYMPTOMS
LIGHT-HEADEDNESS: 1
DIAPHORESIS: 0
FATIGUE: 0
WOUND: 0
JOINT SWELLING: 0
SORE THROAT: 0
NAUSEA: 0
COUGH: 0
NECK PAIN: 0
DYSURIA: 0
BACK PAIN: 0
WHEEZING: 0
HEMATURIA: 0
FEVER: 0
CONFUSION: 0
PALPITATIONS: 0
CONSTIPATION: 0
NERVOUS/ANXIOUS: 0
DIZZINESS: 1
CHEST TIGHTNESS: 0
SHORTNESS OF BREATH: 0
AGITATION: 0
VOMITING: 0
ABDOMINAL PAIN: 0
FACIAL SWELLING: 0
WEAKNESS: 0
CHOKING: 0
DIARRHEA: 0
HALLUCINATIONS: 0
CHILLS: 0

## 2025-04-18 ASSESSMENT — COGNITIVE AND FUNCTIONAL STATUS - GENERAL
MOVING TO AND FROM BED TO CHAIR: A LOT
DRESSING REGULAR LOWER BODY CLOTHING: A LITTLE
TURNING FROM BACK TO SIDE WHILE IN FLAT BAD: A LOT
TOILETING: A LITTLE
STANDING UP FROM CHAIR USING ARMS: A LOT
CLIMB 3 TO 5 STEPS WITH RAILING: TOTAL
DRESSING REGULAR UPPER BODY CLOTHING: A LITTLE
STANDING UP FROM CHAIR USING ARMS: A LOT
MOVING FROM LYING ON BACK TO SITTING ON SIDE OF FLAT BED WITH BEDRAILS: A LITTLE
TURNING FROM BACK TO SIDE WHILE IN FLAT BAD: A LITTLE
WALKING IN HOSPITAL ROOM: A LOT
MOBILITY SCORE: 15
MOBILITY SCORE: 11
PERSONAL GROOMING: A LITTLE
MOVING FROM LYING ON BACK TO SITTING ON SIDE OF FLAT BED WITH BEDRAILS: A LITTLE
MOVING TO AND FROM BED TO CHAIR: A LITTLE
CLIMB 3 TO 5 STEPS WITH RAILING: A LOT
DAILY ACTIVITIY SCORE: 19
WALKING IN HOSPITAL ROOM: TOTAL
HELP NEEDED FOR BATHING: A LITTLE

## 2025-04-18 ASSESSMENT — PAIN SCALES - GENERAL
PAINLEVEL_OUTOF10: 0 - NO PAIN

## 2025-04-18 ASSESSMENT — ACTIVITIES OF DAILY LIVING (ADL): LACK_OF_TRANSPORTATION: NO

## 2025-04-18 ASSESSMENT — PAIN - FUNCTIONAL ASSESSMENT: PAIN_FUNCTIONAL_ASSESSMENT: 0-10

## 2025-04-18 NOTE — ASSESSMENT & PLAN NOTE
CT imaging showing chronic T7 fracture, chronic fractures of the superior and inferior pubic rami and sacrum  PT/OT/SS consulted for possible SNF  Gentle hydration overnight followed by orthostatics in the AM.  Also check creatine kinase given how long she was down for.  UA pending, may have UTI.  4/17: Suspect orthostatic hypotension secondary to dehydration.  Continue PT and OT.  Will relax fluid restriction.  Continue to hold Lasix and Aldactone.  No evidence of rhabdomyolysis.  Will likely need skilled nursing on discharge.  Trend BMP.  4/18: Continue slow hydration.  Holding diuretics but avoid boluses.  No nystagmus on exam.  No significant changes on orthostatic vital signs but did feel symptomatic.  Continue PT and OT.

## 2025-04-18 NOTE — CARE PLAN
The patient's goals for the shift include      The clinical goals for the shift include Pt will remain free from falls throughout shift    Over the shift, the patient did not make progress toward the following goals.

## 2025-04-18 NOTE — PROGRESS NOTES
Social work consult placed for discharge planning. SW reviewed pt's chart and communicated with T[facility]. No SW needs foreseen at this time. SW signing off; available upon request.    SONAL Manuel ([id number])   Care Transitions

## 2025-04-18 NOTE — PROGRESS NOTES
04/18/25 1147   Discharge Planning   Living Arrangements Alone   Support Systems Children   Assistance Needed Independent with Walker   Type of Residence Private residence   Home or Post Acute Services Post acute facilities (Rehab/SNF/etc)   Type of Post Acute Facility Services Skilled nursing   Expected Discharge Disposition SNF   Does the patient need discharge transport arranged? Yes   RoundTrip coordination needed? Yes   Has discharge transport been arranged? No   Financial Resource Strain   How hard is it for you to pay for the very basics like food, housing, medical care, and heating? Not hard   Housing Stability   In the last 12 months, was there a time when you were not able to pay the mortgage or rent on time? N   At any time in the past 12 months, were you homeless or living in a shelter (including now)? N   Transportation Needs   In the past 12 months, has lack of transportation kept you from medical appointments or from getting medications? no   In the past 12 months, has lack of transportation kept you from meetings, work, or from getting things needed for daily living? No   Patient Choice   Provider Choice list and CMS website (https://medicare.gov/care-compare#search) for post-acute Quality and Resource Measure Data were provided and reviewed with: Patient;Family   Patient / Family choosing to utilize agency / facility established prior to hospitalization Yes     PCP is Sulaiman Santoyo MD. Patient is from home alone with support from her daughter and son. Spoke to patient and her son at bedside. She states that she is usually independent with her walker when in the house and her rollator or Wheelchair when out. Her children assist with transport, cooking, cleaning, bathing, and all other needs. Patient has been recommended for Skilled Nursing Facility. Provided skilled nursing list to [facility] that includes facilities that are within  Post - Acute Quality Network, as  well as meeting patient's medical needs, and are in-network for patient's insurance; while also in discharge geographic area patient prefers, and identifies each facilities CMS star rating. Patient and her son are both agreeable to SNF placement and [facility] would be [facility] as her  was there previously. Will request [facility] send referrals.      [address] can accept patient, requested facility start auth. Updated patient.

## 2025-04-18 NOTE — CARE PLAN
The patient's goals for the shift include      The clinical goals for the shift include Pt will remain free from falls throughout shift      Problem: Pain - Adult  Goal: Verbalizes/displays adequate comfort level or baseline comfort level  Outcome: Progressing     Problem: Safety - Adult  Goal: Free from fall injury  Outcome: Progressing     Problem: Discharge Planning  Goal: Discharge to home or other facility with appropriate resources  Outcome: Progressing     Problem: Chronic Conditions and Co-morbidities  Goal: Patient's chronic conditions and co-morbidity symptoms are monitored and maintained or improved  Outcome: Progressing     Problem: Nutrition  Goal: Nutrient intake appropriate for maintaining nutritional needs  Outcome: Progressing     Problem: Fall/Injury  Goal: Not fall by end of shift  Outcome: Progressing  Goal: Be free from injury by end of the shift  Outcome: Progressing  Goal: Verbalize understanding of personal risk factors for fall in the hospital  Outcome: Progressing  Goal: Verbalize understanding of risk factor reduction measures to prevent injury from fall in the home  Outcome: Progressing  Goal: Use assistive devices by end of the shift  Outcome: Progressing  Goal: Pace activities to prevent fatigue by end of the shift  Outcome: Progressing     Problem: Diabetes  Goal: Achieve decreasing blood glucose levels by end of shift  Outcome: Progressing  Goal: Increase stability of blood glucose readings by end of shift  Outcome: Progressing  Goal: Decrease in ketones present in urine by end of shift  Outcome: Progressing  Goal: Maintain electrolyte levels within acceptable range throughout shift  Outcome: Progressing  Goal: Maintain glucose levels >70mg/dl to <250mg/dl throughout shift  Outcome: Progressing  Goal: No changes in neurological exam by end of shift  Outcome: Progressing  Goal: Learn about and adhere to nutrition recommendations by end of shift  Outcome: Progressing  Goal: Vital signs  within normal range for age by end of shift  Outcome: Progressing  Goal: Increase self care and/or family involovement by end of shift  Outcome: Progressing  Goal: Receive DSME education by end of shift  Outcome: Progressing     Problem: Heart Failure  Goal: Improved gas exchange this shift  Outcome: Progressing  Goal: Improved urinary output this shift  Outcome: Progressing  Goal: Reduction in peripheral edema within 24 hours  Outcome: Progressing  Goal: Report improvement of dyspnea/breathlessness this shift  Outcome: Progressing  Goal: Weight from fluid excess reduced over 2-3 days, then stabilize  Outcome: Progressing  Goal: Increase self care and/or family involvement in 24 hours  Outcome: Progressing     Problem: Pain  Goal: Takes deep breaths with improved pain control throughout the shift  Outcome: Progressing  Goal: Turns in bed with improved pain control throughout the shift  Outcome: Progressing  Goal: Walks with improved pain control throughout the shift  Outcome: Progressing  Goal: Performs ADL's with improved pain control throughout shift  Outcome: Progressing  Goal: Participates in PT with improved pain control throughout the shift  Outcome: Progressing  Goal: Free from opioid side effects throughout the shift  Outcome: Progressing  Goal: Free from acute confusion related to pain meds throughout the shift  Outcome: Progressing     Problem: Skin  Goal: Decreased wound size/increased tissue granulation at next dressing change  Outcome: Progressing  Goal: Participates in plan/prevention/treatment measures  Outcome: Progressing  Goal: Prevent/manage excess moisture  Outcome: Progressing  Goal: Prevent/minimize sheer/friction injuries  Outcome: Progressing  Goal: Promote/optimize nutrition  Outcome: Progressing  Goal: Promote skin healing  Outcome: Progressing

## 2025-04-18 NOTE — ASSESSMENT & PLAN NOTE
Continue home meds.  Update echocardiogram  4/17: LVEF was 66%.  Stage II diastolic dysfunction.  Suspect patient is dry at present.  Hold Lasix and Aldactone.  Relax fluid restriction.  4/18: No evidence of acute heart failure.

## 2025-04-18 NOTE — CONSULTS
Nutrition Initial Assessment:   Nutrition Assessment    Reason for Assessment: Admission nursing screening    Medical history per chart: Gladys Salas is a 89 y.o. female with PMHx s/f cerebral hemorrhage, SVT, afib (not on anticoagulation due to pt choice), Hfpef, HTN, HLD, hypothyroidism, CKD presenting with fall and lightheadedness   Per MD- LVEF was 66%. Stage II diastolic dysfunction. Suspect patient is dry at present. Hold Lasix and Aldactone. Relax fluid restriction.     Nutrition History:  Food and Nutrient History: Spoke with son and patient at bedside. Patient reports she typically eats well at home. Consumes an english muffin or bagel for breakfast, lunch is stouffers and dinner is often somehting light. She is not eating well in the hospital because she does not feel like it. She only had a small breakfast and did not feel like ordering lunch. Fat restriction removed to help with intakes.  Denies n/v/abd pain. Offered ONS but she declined.       Dietary Orders (From admission, onward)       Start     Ordered    04/18/25 1140  Adult diet Regular, 2-3 grams sodium  Diet effective now        Question Answer Comment   Diet type Regular    Diet type 2-3 grams sodium        04/18/25 1139    04/17/25 1440  May Participate in Room Service  ( ROOM SERVICE MAY PARTICIPATE)  Once        Question:  .  Answer:  Yes    04/17/25 1439                  Anthropometrics:  Height: 152.4 cm (5')   Weight: 53.9 kg (118 lb 13.3 oz)   BMI (Calculated): 23.21           Weight History:   Wt Readings from Last 10 Encounters:   04/18/25 53.9 kg (118 lb 13.3 oz)-[facility]   03/26/25 49.6 kg (109 lb 6.4 oz)-MD visit per patient   02/05/25 56.7 kg (125 lb)   08/14/24 56.8 kg (125 lb 3.2 oz)   04/18/24 57.8 kg (127 lb 8 oz)   02/08/24 59 kg (130 lb)   10/04/23 56.2 kg (124 lb)   08/03/23 56.2 kg (124 lb)   07/03/23 56.4 kg (124 lb 6 oz)   03/29/23 56.2 kg (124 lb)       Weight Change %:  Weight History / % Weight Change: 04/18/25  53.9 kg (118 lb 13.3 oz)  03/26/25 49.6 kg (109 lb 6.4 oz)  02/05/25 56.7 kg (125 lb), if weights are accurate its about 16 lbs weight loss the past couple of months. Patient thinks its around 7 lbs  Significant Weight Loss: Yes  Interpretation of Weight Loss: >5% in 1 month    Nutrition Focused Physical Exam Findings:  Subcutaneous Fat Loss:   Orbital Fat Pads: Mild-Moderate (slight dark circles and slight hollowing)  Buccal Fat Pads: Mild-Moderate (flat cheeks, minimal bounce)  Muscle Wasting:  Temporalis: Mild-Moderate (slight depression)  Pectoralis (Clavicular Region): Mild-Moderate (some protrusion of clavicle)  Edema:  Edema: none  Physical Findings:  Skin: Negative    Nutrition Significant Labs:  Results from last 7 days   Lab Units 04/18/25  0314 04/17/25  0608 04/16/25 2022   GLUCOSE mg/dL 92 113* 124*   SODIUM mmol/L 138 138 136   POTASSIUM mmol/L 4.2 4.6 4.0   CHLORIDE mmol/L 106 102 97*   CO2 mmol/L 28 27 28   BUN mg/dL 42* 50* 55*   CREATININE mg/dL 1.29* 1.48* 1.55*   EGFR mL/min/1.73m*2 38* 32* 31*   CALCIUM mg/dL 8.1* 7.9* 9.9   MAGNESIUM mg/dL  --   --  2.02     Lab Results   Component Value Date    HGBA1C 6.2 (H) 03/19/2025    HGBA1C 6.0 (H) 09/19/2024     Results from last 7 days   Lab Units 04/18/25  0733 04/17/25  1926 04/17/25  1458 04/17/25  1045 04/17/25  0606   POCT GLUCOSE mg/dL 100* 113* 108* 142* 111*       Nutrition Specific Medications:  Meds reviewed/noted.   Scheduled Medications[1]   Continuous Medications[2]     I/O:    ;      Estimated Needs:   Total Energy Estimated Needs in 24 hours (kCal):  (1250)  Method for Estimating Needs: 25 kcal/kg 49.6 kg  Total Protein Estimated Needs in 24 Hours (g): 50 g  Method for Estimating 24 Hour Protein Needs: 1.0 g/kg 49.6 kg     Method for Estimating 24 Hour Fluid Needs: 1 mL/kcal        Nutrition Diagnosis   Malnutrition Diagnosis  Patient has Malnutrition Diagnosis: Yes  Diagnosis Status: New  Malnutrition Diagnosis: Moderate malnutrition  related to chronic disease or condition  Related to: advanced age, CKD  As Evidenced by: mild to moderate muscle and fat losses, >5% weight loss the past month    Nutrition Diagnosis  Patient has Nutrition Diagnosis: Yes  Nutrition Diagnosis 1: Inadequate oral intake  Related to (1): decreased ability to consume sufficient intakes  As Evidenced by (1): reduced appetite since being in the hospital       Nutrition Interventions/Recommendations   Nutrition prescription for oral nutrition    Nutrition Recommendations:  Individualized Nutrition Prescription Provided for : Removed fat restriction due to advanced age. Encourage po intakes-pt declined supplements. May need to remove Sodium restriction as well if po intakes do not improve    Nutrition Interventions/Goals:   Interventions: Meals and snacks  Meals and Snacks: Mineral-modified diet  Goal: consume >50%      Education Documentation  Nutrition Related Education, taught by Deanna M McGuire Lombardo, RDN, BRENNA at 4/18/2025 11:30 AM.  Learner: Family, Patient  Readiness: Acceptance  Method: Explanation  Response: Verbalizes Understanding  Comment: Reviewed diet, ONS              Nutrition Monitoring and Evaluation   Food/Nutrient Related History Monitoring  Monitoring and Evaluation Plan: Estimated Energy Intake  Estimated Energy Intake: Energy intake greater or equal to 75% of estimated energy needs    Anthropometric Measurements  Monitoring and Evaluation Plan: Body weight  Body Weight: Body weight - Maintain stable weight    Biochemical Data, Medical Tests and Procedures  Monitoring and Evaluation Plan: Electrolyte/renal panel, Glucose/endocrine profile  Electrolyte and Renal Panel: Electrolytes within normal limits  Glucose/Endocrine Profile: Glucose within normal limits ( mg/dL)    Physical Exam Findings  Monitoring and Evaluation Plan: Skin  Skin Finding: Promote intact skin - Promote skin integrity    Goal Status: New goal(s) identified    Time Spent  (min): 45 minutes              [1] carvedilol, 25 mg, oral, BID  heparin (porcine), 5,000 Units, subcutaneous, q8h RENA  insulin lispro, 0-5 Units, subcutaneous, TID AC  levothyroxine, 50 mcg, oral, Daily  pantoprazole, 40 mg, oral, Daily   Or  pantoprazole, 40 mg, intravenous, Daily  PARoxetine, 20 mg, oral, Daily  perflutren lipid microspheres, 0.5-10 mL of dilution, intravenous, Once in imaging  perflutren protein A microsphere, 0.5 mL, intravenous, Once in imaging  perflutren protein A microsphere, 0.5 mL, intravenous, Once in imaging  [Held by provider] spironolactone, 12.5 mg, oral, Daily  sulfur hexafluoride microsphr, 2 mL, intravenous, Once in imaging     [2]

## 2025-04-18 NOTE — CARE PLAN
Problem: Mobility  Goal: Goal 1  Description: 20 REPS RROM INCREASING STRENGTH TO STABILIZE GAIT  Outcome: Progressing     Problem: PT Transfers  Goal: STG - Transfer from bed to chair  Description: FWW MIN A X1  Outcome: Progressing     Problem: PT Transfers  Goal: STG - Patient will transfer sit to and from stand  Description: MIN  A X1 FWW USING PROPER TECHNIQUE  Outcome: Progressing     Problem: Mobility  Goal: STG - Patient will ambulate  Description: FWW MIN A X2, 50 FT  Outcome: Not Progressing

## 2025-04-18 NOTE — PROGRESS NOTES
Physical Therapy    Physical Therapy Treatment    Patient Name: Gladys Salas  MRN: [id number]  Department: [address]  Room: 2017/2017-A  Today's Date: 4/18/2025  Time Calculation  Start Time: 1142  Stop Time: 1211  Time Calculation (min): 29 min         Assessment/Plan   PT Assessment  PT Assessment Results: Decreased strength, Decreased endurance, Impaired balance, Decreased mobility, Decreased safety awareness, Impaired hearing  Rehab Prognosis: Fair  Barriers to Discharge Home: Caregiver assistance, Cognition needs, Physical needs  Caregiver Assistance: Patient lives alone and/or does not have reliable caregiver assistance  Cognition Needs: 24hr supervision for safety awareness needed, Medication and/or medical management daily assist needed, Recollection or understanding of precautions/restrictions limited, Recollection or understanding of home exercise program limited, Insight of patient limited regarding functional ability/needs, Cognition-related high falls risk  Physical Needs: Stair navigation into home limited by function/safety, In-home setup navigation limited by function/safety, 24hr mobility assistance needed, 24hr ADL assistance needed, High falls risk due to function or environment  Evaluation/Treatment Tolerance: Patient limited by fatigue  End of Session Communication: Bedside nurse  Assessment Comment: DECREASED ASSIST TO TRANSFER TO MAX X1 W/ BEAR HUG FROM 2 ASSIST, LESS DIZZINESS W/ ACTIVITYTODAY ABLE TO GET UP TO CHAIR,  COMPLETED 10 REPS EX IN CHAIR, REMAINS HIGH FALL RISK, CONT TO RECOMMEND MOD REHAB & AL I NFO  PT Plan  Inpatient/Swing Bed or Outpatient: Inpatient  PT Plan  Treatment/Interventions: Bed mobility, Transfer training, Gait training, Strengthening, Endurance training (VESTIBULAR EX)  PT Plan: Ongoing PT  PT Frequency: 3 times per week  PT Discharge Recommendations: Moderate intensity level of continued care  Equipment Recommended upon Discharge:  (TBD)  PT Recommended Transfer  Status: Assist x1, Assist x2 (FWW)  PT - OK to Discharge: Yes (WHENMEDICALLY CLEARED)      General Visit Information:   PT  Visit  PT Received On: 04/18/25  Response to Previous Treatment: Patient with no complaints from previous session.  General  Prior to Session Communication: Bedside nurse  Patient Position Received: Bed, 3 rail up, Alarm on (ROOM 2017 ALERT IV AGREES TO THERAPY)  General Comment: PT. REPORTS LESS DIZZINESS TODAY- LIGHTHEADED SLIGHTLY W/ SITTINGA DN DIZZY W/ TRANSFERS, WAS UP TO BSC W/ PCA TODAY    Subjective   Precautions:  Precautions  Hearing/Visual Limitations: Habematolel  Medical Precautions: Fall precautions            Objective   Pain:  Pain Assessment  0-10 (Numeric) Pain Score: 0 - No pain  Cognition:  Cognition  Overall Cognitive Status: Within Functional Limits  Arousal/Alertness: Appropriate responses to stimuli  Orientation Level: Oriented X4  Safety/Judgement: Exceptions to WFL  Complex Functional Tasks: Minimal  Novel Situations: Minimal  Routine Tasks: Minimal  Coordination:     Postural Control:  Static Sitting Balance  Static Sitting-Comment/Number of Minutes: FAIR  Dynamic Sitting Balance  Dynamic Sitting-Comments: FAIR/FAIR-  Static Standing Balance  Static Standing-Comment/Number of Minutes: FAIR-  Dynamic Standing Balance  Dynamic Standing-Comments: FAIR-  Extremity/Trunk Assessments:  Cervical Spine   Cervical Spine:  (MILD FORWARD HEAD, ROUNDED SHOULDERS)  Activity Tolerance:  Activity Tolerance  Endurance: Decreased tolerance for upright activites  Treatments:  Therapeutic Exercise  Therapeutic Exercise Performed:  (SEATED IN CHAIR- 10 REPS ACTIVE AP,KICKS MARCHED SQUEEZING PILLOW FOR HIP ADDUCTION,  MINIMAL RESISTANCE FOR HIP ABDUCTION)         Balance/Neuromuscular Re-Education  Balance/Neuromuscular Re-Education Activity Performed:  (SAT EOB SBA FOR 4 MIN)    Bed Mobility  Bed Mobility:  (SUPINE>SIT MIN A USING RAILS)    Ambulation/Gait Training  Ambulation/Gait Training  Performed:  (MAX X 1 BEAR HUG ASSIST, 2 FT TO CHAIR, SMALL SHUFFLE STEPS)  Transfers  Transfer:  (SIT<>STAND BEAR HUG MAX X1, MILD RETRO LEAN)    Outcome Measures:  [facility] Basic Mobility  Turning from your back to your side while in a flat bed without using bedrails: A little  Moving from lying on your back to sitting on the side of a flat bed without using bedrails: A lot  Moving to and from bed to chair (including a wheelchair): A lot  Standing up from a chair using your arms (e.g. wheelchair or bedside chair): A lot  To walk in hospital room: Total  Climbing 3-5 steps with railing: Total  Basic Mobility - Total Score: 11    Education Documentation  Mobility Training, taught by Uyen Huang, PT at 4/18/2025  3:29 PM.  Learner: Patient  Readiness: Acceptance  Method: Explanation  Response: Needs Reinforcement  Comment: MOBILITY SAFETY    Education Comments  No comments found.        OP EDUCATION:       Encounter Problems       Encounter Problems (Active)       Mobility       STG - Patient will ambulate (Not Progressing)       Start:  04/17/25    Expected End:  04/28/25       FWW MIN A X2, 50 FT         Goal 1 (Progressing)       Start:  04/17/25    Expected End:  05/08/25       20 REPS RROM INCREASING STRENGTH TO STABILIZE GAIT            PT Transfers       STG - Transfer from bed to chair (Progressing)       Start:  04/17/25    Expected End:  04/21/25       FWW MIN A X1         STG - Patient will transfer sit to and from stand (Progressing)       Start:  04/17/25    Expected End:  04/19/25       MIN  A X1 FWW USING PROPER TECHNIQUE            Pain - Adult

## 2025-04-18 NOTE — PROGRESS NOTES
Occupational Therapy                 Therapy Communication Note    Patient Name: Gladys Salas  MRN: [id number]  Department: [facility]  Room: 2017/2017-A  Today's Date: 4/18/2025     Discipline: Occupational Therapy          Missed Visit Reason:  Pt declined OT because she has PT already and she gets too dizzy    Missed Time: Attempt    Comment:

## 2025-04-18 NOTE — NURSING NOTE
BS incresaed to 191 after pt ate dinner and snacks provided. No voiced complaints. Anne was removed without diff. As ordered approx. 6 pm.

## 2025-04-18 NOTE — PROGRESS NOTES
Gladys Salas is a 89 y.o. female on day 1 of admission presenting with Fall, initial encounter.    Review of Systems   Constitutional:  Negative for chills, diaphoresis, fatigue and fever.   HENT:  Negative for congestion, facial swelling, sneezing and sore throat.    Respiratory:  Negative for cough, choking, chest tightness, shortness of breath and wheezing.    Cardiovascular:  Negative for chest pain, palpitations and leg swelling.   Gastrointestinal:  Negative for abdominal pain, constipation, diarrhea, nausea and vomiting.   Genitourinary:  Negative for dysuria, hematuria and urgency.   Musculoskeletal:  Negative for back pain, gait problem, joint swelling and neck pain.   Skin:  Negative for rash and wound.   Neurological:  Positive for dizziness and light-headedness. Negative for syncope and weakness.   Psychiatric/Behavioral:  Negative for agitation, confusion and hallucinations. The patient is not nervous/anxious.    All other systems reviewed and are negative.     Subjective   Gladys Salas is a 89 y.o. female with PMHx s/f cerebral hemorrhage, SVT, afib (not on anticoagulation due to pt choice), Hfpef, HTN, HLD, hypothyroidism, CKD presenting with fall and lightheadedness. Pt suffered a fall at her home today when she was reaching under the kitchen sink to get something. She became acutely lightheaded and fell to the floor on her tailbone. Had headache and tailbone pain, but no LOC. It took her about two hours to reach a phone to call for help. Pt denies any further falls, but admits to lightheaded episodes when standing up almost every day. Symptoms have been intermittent, but have acutely worsened. She describes it as a spinning sensation accompanied by pre-syncope. Her PCP took her off of Jardiance last month because pt felt it was causing these lightheaded symptoms. No chest pain, nausea, vomiting, palpitations, vision changes, abdominal pain, urinary complaints or other symptoms with this. She  is taking her medications as prescribed.     4/17: Patient seen.  Looks clinically very dry, dry skin, dry mucous membranes.  Will DC Lasix and Aldactone for now.  Relax fluid restriction.  Check orthostatic vital signs.  Renal function also looks a little worse than January.  Will try to avoid IV boluses.  PT and OT evaluation and treatment.  Will likely need skilled nursing on discharge.  May have limited options for SNF per insurance.    4/18: Patient seen.  Appears to be less clinically dry today.  BUN and creatinine are slightly improved.  Still wished to avoid fluid boluses if possible.  Continue to hold diuretics.  Patient states he still feels dizzy today but a little bit less so.  No nystagmus on exam.  Patient was symptomatic with orthostatic vital signs but did not have significant change in her vitals yesterday.  Discharge planning in progress for skilled nursing facility.       Objective     Last Recorded Vitals  /63 (BP Location: Left arm, Patient Position: Lying)   Pulse 81   Temp 36.4 °C (97.5 °F) (Temporal)   Resp 18   Wt 53.9 kg (118 lb 13.3 oz)   SpO2 94%   Intake/Output last 3 Shifts:    Intake/Output Summary (Last 24 hours) at 4/18/2025 1244  Last data filed at 4/18/2025 0730  Gross per 24 hour   Intake 990 ml   Output 900 ml   Net 90 ml       Admission Weight  Weight: 49.9 kg (110 lb) (04/16/25 1750)    Daily Weight  04/18/25 : 53.9 kg (118 lb 13.3 oz)      Physical Exam  Constitutional:       General: She is not in acute distress.  HENT:      Head: Normocephalic and atraumatic.      Nose: Nose normal. No congestion or rhinorrhea.      Mouth/Throat:      Mouth: Mucous membranes are dry.      Pharynx: Oropharynx is clear.   Eyes:      General: No scleral icterus.     Extraocular Movements: Extraocular movements intact.      Pupils: Pupils are equal, round, and reactive to light.   Cardiovascular:      Rate and Rhythm: Normal rate and regular rhythm.      Heart sounds: Normal heart  sounds. No murmur heard.     No friction rub. No gallop.   Pulmonary:      Effort: Pulmonary effort is normal.      Breath sounds: Normal breath sounds. No wheezing, rhonchi or rales.   Chest:      Chest wall: No tenderness.   Abdominal:      General: There is no distension.      Palpations: Abdomen is soft.      Tenderness: There is no abdominal tenderness. There is no guarding or rebound.   Musculoskeletal:         General: No swelling, tenderness or signs of injury. Normal range of motion.      Cervical back: Normal range of motion.   Skin:     General: Skin is warm and dry.      Coloration: Skin is not jaundiced.      Findings: No bruising, erythema or rash.   Neurological:      General: No focal deficit present.      Mental Status: She is alert and oriented to person, place, and time.          Lab Results  Results for orders placed or performed during the hospital encounter of 04/16/25 (from the past 24 hours)   POCT GLUCOSE   Result Value Ref Range    POCT Glucose 108 (H) 74 - 99 mg/dL   POCT GLUCOSE   Result Value Ref Range    POCT Glucose 113 (H) 74 - 99 mg/dL   Basic Metabolic Panel   Result Value Ref Range    Glucose 92 74 - 99 mg/dL    Sodium 138 136 - 145 mmol/L    Potassium 4.2 3.5 - 5.3 mmol/L    Chloride 106 98 - 107 mmol/L    Bicarbonate 28 21 - 32 mmol/L    Anion Gap 8 (L) 10 - 20 mmol/L    Urea Nitrogen 42 (H) 6 - 23 mg/dL    Creatinine 1.29 (H) 0.50 - 1.05 mg/dL    eGFR 38 (L) >60 mL/min/1.73m*2    Calcium 8.1 (L) 8.6 - 10.3 mg/dL   POCT GLUCOSE   Result Value Ref Range    POCT Glucose 100 (H) 74 - 99 mg/dL   POCT GLUCOSE   Result Value Ref Range    POCT Glucose 256 (H) 74 - 99 mg/dL        Image Results  Transthoracic Echo (TTE) [place] [address]       Phone [phone] Fax [phone]    TRANSTHORACIC ECHOCARDIOGRAM REPORT    Patient Name:       DEREK COLE     Reading Physician:    [id number] Jag Aguirre                                                                  DO  Study Date:         4/17/2025            Ordering Provider:    [id number] CONSTANTINO GARCIA  MRN/PID:            [id number]             Fellow:  Accession#:         [id number]         Nurse:  Date of Birth/Age:  4/12/1929 / 89 years Sonographer:          Rasheeda Pack RDCS  Gender Assigned at  F                    Additional Staff:  Birth:  Height:             152.40 cm            Admit Date:           4/16/2025  Weight:             49.90 kg             Admission Status:     Inpatient -                                                                 Routine  BSA / BMI:          1.45 m2 / 21.48      Department Location:  [facility]                      kg/m2  Blood Pressure: 108 /66 mmHg    Study Type:    TRANSTHORACIC ECHO (TTE) LIMITED  Diagnosis/ICD: Dyspnea, unspecified-R06.00; Elevated Troponin-R79.89  Indication:    Dyspnea, Elevated Troponin  CPT Codes:     Echo Limited-79436; Doppler Limited-40959; Color Doppler-52141    Patient History:  Pertinent History: CHF, HTN, Hyperlipidemia, A-Fib, Previous SVT and Dyspnea.    Study Detail: The following Echo studies were performed: 2D, Doppler and color                flow. Unable to obtain suprasternal notch view.       PHYSICIAN INTERPRETATION:  Left Ventricle: The left ventricular systolic function is normal, with a Escalante's biplane calculated ejection fraction of 66%. There are no regional wall motion abnormalities. The left ventricular cavity size is normal. Spectral Doppler shows a Grade II (pseudonormal pattern) of left ventricular diastolic filling with an elevated left atrial pressure.  Left Atrium: The left atrial size was not assessed.  Right Ventricle: The right ventricle is upper limits of normal in size. There is mildly reduced right ventricular systolic function.  Right Atrium: The right atrial size was not assessed.  Aortic Valve: The aortic valve  is trileaflet. Aortic valve regurgitation was not assessed.  Mitral Valve: The mitral valve is mild to moderately thickened. There is mild mitral valve regurgitation.  Tricuspid Valve: The tricuspid valve is structurally normal. There is moderate tricuspid regurgitation. The Doppler estimated RVSP is mildly elevated right ventricular systolic pressure at 38.3 mmHg.  Pulmonic Valve: The pulmonic valve was not assessed. The pulmonic valve regurgitation was not assessed.  Pericardium: Pericardial effusion was not assessed.  Aorta: The aortic root was not assessed.       CONCLUSIONS:   1. The left ventricular systolic function is normal, with a Escalante's biplane calculated ejection fraction of 66%.   2. Spectral Doppler shows a Grade II (pseudonormal pattern) of left ventricular diastolic filling with an elevated left atrial pressure.   3. There is mildly reduced right ventricular systolic function.   4. Right ventricle is upper limits of normal in size.   5. Mildly elevated right ventricular systolic pressure.   6. Moderate tricuspid regurgitation.    QUANTITATIVE DATA SUMMARY:     2D MEASUREMENTS:             Normal Ranges:  LVEDV Index:     20.18 ml/m2       LV SYSTOLIC FUNCTION:                       Normal Ranges:  EF-A4C View:    62 % (>=55%)  EF-A2C View:    72 %  EF-Biplane:     66 %  LV EF Reported: 66 %       LV DIASTOLIC FUNCTION:           Normal Ranges:  MV Peak E:             1.07 m/s  (0.7-1.2 m/s)  MV Peak A:             0.33 m/s  (0.42-0.7 m/s)  E/A Ratio:             3.28      (1.0-2.2)  MV e'                  0.052 m/s (>8.0)  MV lateral e'          0.05 m/s  MV medial e'           0.06 m/s  E/e' Ratio:            20.48     (<8.0)       MITRAL VALVE:          Normal Ranges:  MV DT:        158 msec (150-240msec)       RIGHT VENTRICLE:  RV Basal 3.70 cm  RV Mid   2.20 cm  RV Major 4.7 cm  TAPSE:   13.1 mm  RV s'    0.09 m/s       TRICUSPID VALVE/RVSP:          Normal Ranges:  Peak TR Velocity:     2.97  m/s  RV Syst Pressure:     38 mmHg  (< 30mmHg)  IVC Diam:             1.50 cm       [id number] Jag Aguirre DO  Electronically signed on 4/17/2025 at 1:19:46 PM       ** Final **  ECG 12 lead  Atrial fibrillation  Borderline low voltage, extremity leads  Borderline ST depression, inferior leads  Borderline prolonged QT interval    See ED provider note for full interpretation and clinical correlation  Confirmed by Patricia Taylor ([id number]) on 4/17/2025 11:48:10 AM       Assessment/Plan     Assessment & Plan  Fall, initial encounter  CT imaging showing chronic T7 fracture, chronic fractures of the superior and inferior pubic rami and sacrum  PT/OT/SS consulted for possible SNF  Gentle hydration overnight followed by orthostatics in the AM.  Also check creatine kinase given how long she was down for.  UA pending, may have UTI.  4/17: Suspect orthostatic hypotension secondary to dehydration.  Continue PT and OT.  Will relax fluid restriction.  Continue to hold Lasix and Aldactone.  No evidence of rhabdomyolysis.  Will likely need skilled nursing on discharge.  Trend BMP.  4/18: Continue slow hydration.  Holding diuretics but avoid boluses.  No nystagmus on exam.  No significant changes on orthostatic vital signs but did feel symptomatic.  Continue PT and OT.  Hypothyroidism    Stage 3 chronic kidney disease, unspecified whether stage 3a or 3b CKD (Multi)    (HFpEF) heart failure with preserved ejection fraction  Continue home meds.  Update echocardiogram  4/17: LVEF was 66%.  Stage II diastolic dysfunction.  Suspect patient is dry at present.  Hold Lasix and Aldactone.  Relax fluid restriction.  4/18: No evidence of acute heart failure.  Type 2 diabetes mellitus  SSI ordered while inpatient  Accuchecks, hypoglycemic protocol  Elevated troponin  Trops 58 -> 64, recheck with  AM labs  No ACS symptoms currently. EKG benign. Check TTE in AM.  4/17: Normal LVEF.  Stage II diastolic dysfunction.  Suspect troponin elevation is  secondary to demand ischemia related to her fall and injury.          Manuel Hsu MD

## 2025-04-19 LAB
ANION GAP SERPL CALC-SCNC: 11 MMOL/L (ref 10–20)
BUN SERPL-MCNC: 32 MG/DL (ref 6–23)
CALCIUM SERPL-MCNC: 8.4 MG/DL (ref 8.6–10.3)
CHLORIDE SERPL-SCNC: 106 MMOL/L (ref 98–107)
CO2 SERPL-SCNC: 26 MMOL/L (ref 21–32)
CREAT SERPL-MCNC: 1.27 MG/DL (ref 0.5–1.05)
EGFRCR SERPLBLD CKD-EPI 2021: 39 ML/MIN/1.73M*2
GLUCOSE BLD MANUAL STRIP-MCNC: 118 MG/DL (ref 74–99)
GLUCOSE BLD MANUAL STRIP-MCNC: 121 MG/DL (ref 74–99)
GLUCOSE BLD MANUAL STRIP-MCNC: 228 MG/DL (ref 74–99)
GLUCOSE BLD MANUAL STRIP-MCNC: 45 MG/DL (ref 74–99)
GLUCOSE SERPL-MCNC: 112 MG/DL (ref 74–99)
POTASSIUM SERPL-SCNC: 4.3 MMOL/L (ref 3.5–5.3)
SODIUM SERPL-SCNC: 139 MMOL/L (ref 136–145)

## 2025-04-19 PROCEDURE — 82947 ASSAY GLUCOSE BLOOD QUANT: CPT

## 2025-04-19 PROCEDURE — 2500000004 HC RX 250 GENERAL PHARMACY W/ HCPCS (ALT 636 FOR OP/ED): Performed by: STUDENT IN AN ORGANIZED HEALTH CARE EDUCATION/TRAINING PROGRAM

## 2025-04-19 PROCEDURE — 1200000002 HC GENERAL ROOM WITH TELEMETRY DAILY

## 2025-04-19 PROCEDURE — 2500000002 HC RX 250 W HCPCS SELF ADMINISTERED DRUGS (ALT 637 FOR MEDICARE OP, ALT 636 FOR OP/ED): Performed by: INTERNAL MEDICINE

## 2025-04-19 PROCEDURE — 2500000001 HC RX 250 WO HCPCS SELF ADMINISTERED DRUGS (ALT 637 FOR MEDICARE OP): Performed by: STUDENT IN AN ORGANIZED HEALTH CARE EDUCATION/TRAINING PROGRAM

## 2025-04-19 PROCEDURE — 99232 SBSQ HOSP IP/OBS MODERATE 35: CPT | Performed by: INTERNAL MEDICINE

## 2025-04-19 PROCEDURE — 36415 COLL VENOUS BLD VENIPUNCTURE: CPT | Performed by: INTERNAL MEDICINE

## 2025-04-19 PROCEDURE — 80048 BASIC METABOLIC PNL TOTAL CA: CPT | Performed by: INTERNAL MEDICINE

## 2025-04-19 PROCEDURE — 97116 GAIT TRAINING THERAPY: CPT | Mod: GP,CQ

## 2025-04-19 PROCEDURE — 97535 SELF CARE MNGMENT TRAINING: CPT | Mod: GO,CO

## 2025-04-19 PROCEDURE — 97110 THERAPEUTIC EXERCISES: CPT | Mod: GP,CQ

## 2025-04-19 PROCEDURE — 2500000002 HC RX 250 W HCPCS SELF ADMINISTERED DRUGS (ALT 637 FOR MEDICARE OP, ALT 636 FOR OP/ED): Performed by: STUDENT IN AN ORGANIZED HEALTH CARE EDUCATION/TRAINING PROGRAM

## 2025-04-19 RX ADMIN — INSULIN LISPRO 2 UNITS: 100 INJECTION, SOLUTION INTRAVENOUS; SUBCUTANEOUS at 11:42

## 2025-04-19 RX ADMIN — CARVEDILOL 25 MG: 25 TABLET, FILM COATED ORAL at 21:20

## 2025-04-19 RX ADMIN — CARVEDILOL 25 MG: 25 TABLET, FILM COATED ORAL at 09:37

## 2025-04-19 RX ADMIN — HEPARIN SODIUM 5000 UNITS: 5000 INJECTION, SOLUTION INTRAVENOUS; SUBCUTANEOUS at 14:58

## 2025-04-19 RX ADMIN — HEPARIN SODIUM 5000 UNITS: 5000 INJECTION, SOLUTION INTRAVENOUS; SUBCUTANEOUS at 06:32

## 2025-04-19 RX ADMIN — PANTOPRAZOLE SODIUM 40 MG: 40 TABLET, DELAYED RELEASE ORAL at 09:37

## 2025-04-19 RX ADMIN — LEVOTHYROXINE SODIUM 50 MCG: 0.05 TABLET ORAL at 06:32

## 2025-04-19 RX ADMIN — PAROXETINE HYDROCHLORIDE 20 MG: 20 TABLET, FILM COATED ORAL at 21:20

## 2025-04-19 RX ADMIN — HEPARIN SODIUM 5000 UNITS: 5000 INJECTION, SOLUTION INTRAVENOUS; SUBCUTANEOUS at 21:19

## 2025-04-19 ASSESSMENT — COGNITIVE AND FUNCTIONAL STATUS - GENERAL
MOBILITY SCORE: 15
EATING MEALS: A LITTLE
MOVING TO AND FROM BED TO CHAIR: A LITTLE
HELP NEEDED FOR BATHING: A LITTLE
WALKING IN HOSPITAL ROOM: A LITTLE
DAILY ACTIVITIY SCORE: 19
CLIMB 3 TO 5 STEPS WITH RAILING: A LOT
DRESSING REGULAR LOWER BODY CLOTHING: A LOT
HELP NEEDED FOR BATHING: A LOT
PERSONAL GROOMING: A LITTLE
STANDING UP FROM CHAIR USING ARMS: A LITTLE
MOVING FROM LYING ON BACK TO SITTING ON SIDE OF FLAT BED WITH BEDRAILS: A LITTLE
STANDING UP FROM CHAIR USING ARMS: A LOT
MOVING TO AND FROM BED TO CHAIR: A LITTLE
WALKING IN HOSPITAL ROOM: A LOT
DRESSING REGULAR UPPER BODY CLOTHING: A LOT
TURNING FROM BACK TO SIDE WHILE IN FLAT BAD: A LITTLE
MOBILITY SCORE: 17
CLIMB 3 TO 5 STEPS WITH RAILING: A LOT
PERSONAL GROOMING: A LITTLE
DRESSING REGULAR UPPER BODY CLOTHING: A LITTLE
MOVING FROM LYING ON BACK TO SITTING ON SIDE OF FLAT BED WITH BEDRAILS: A LITTLE
TOILETING: A LOT
TOILETING: A LITTLE
TURNING FROM BACK TO SIDE WHILE IN FLAT BAD: A LITTLE
DAILY ACTIVITIY SCORE: 14
DRESSING REGULAR LOWER BODY CLOTHING: A LITTLE

## 2025-04-19 ASSESSMENT — ENCOUNTER SYMPTOMS
WOUND: 0
DIARRHEA: 0
CHEST TIGHTNESS: 0
BACK PAIN: 0
DYSURIA: 0
NAUSEA: 0
ABDOMINAL PAIN: 0
DIAPHORESIS: 0
CHOKING: 0
COUGH: 0
SHORTNESS OF BREATH: 0
CONSTIPATION: 0
NECK PAIN: 0
FEVER: 0
CHILLS: 0
HALLUCINATIONS: 0
NERVOUS/ANXIOUS: 0
CONFUSION: 0
PALPITATIONS: 0
WHEEZING: 0
JOINT SWELLING: 0
WEAKNESS: 0
LIGHT-HEADEDNESS: 1
SORE THROAT: 0
FACIAL SWELLING: 0
HEMATURIA: 0
VOMITING: 0
DIZZINESS: 1
FATIGUE: 0
AGITATION: 0

## 2025-04-19 ASSESSMENT — PAIN SCALES - GENERAL
PAINLEVEL_OUTOF10: 0 - NO PAIN

## 2025-04-19 ASSESSMENT — ACTIVITIES OF DAILY LIVING (ADL): HOME_MANAGEMENT_TIME_ENTRY: 23

## 2025-04-19 ASSESSMENT — PAIN - FUNCTIONAL ASSESSMENT
PAIN_FUNCTIONAL_ASSESSMENT: 0-10

## 2025-04-19 NOTE — CARE PLAN
The patient's goals for the shift include      The clinical goals for the shift include pt will remain hemodynamically stable this shift    Over the shift, the patient did not make progress toward the following goals. Barriers to progression include . Recommendations to address these barriers include .

## 2025-04-19 NOTE — CARE PLAN
The patient's goals for the shift include      The clinical goals for the shift include pt to remain hemodynamically stable throughout shift    Over the shift, the patient did not make progress toward the following goals.

## 2025-04-19 NOTE — PROGRESS NOTES
Gladys Salas is a 89 y.o. female on day 2 of admission presenting with Fall, initial encounter.    Review of Systems   Constitutional:  Negative for chills, diaphoresis, fatigue and fever.   HENT:  Negative for congestion, facial swelling, sneezing and sore throat.    Respiratory:  Negative for cough, choking, chest tightness, shortness of breath and wheezing.    Cardiovascular:  Negative for chest pain, palpitations and leg swelling.   Gastrointestinal:  Negative for abdominal pain, constipation, diarrhea, nausea and vomiting.   Genitourinary:  Negative for dysuria, hematuria and urgency.   Musculoskeletal:  Negative for back pain, gait problem, joint swelling and neck pain.   Skin:  Negative for rash and wound.   Neurological:  Positive for dizziness and light-headedness. Negative for syncope and weakness.   Psychiatric/Behavioral:  Negative for agitation, confusion and hallucinations. The patient is not nervous/anxious.    All other systems reviewed and are negative.     Subjective   Gladys Salas is a 89 y.o. female with PMHx s/f cerebral hemorrhage, SVT, afib (not on anticoagulation due to pt choice), Hfpef, HTN, HLD, hypothyroidism, CKD presenting with fall and lightheadedness. Pt suffered a fall at her home today when she was reaching under the kitchen sink to get something. She became acutely lightheaded and fell to the floor on her tailbone. Had headache and tailbone pain, but no LOC. It took her about two hours to reach a phone to call for help. Pt denies any further falls, but admits to lightheaded episodes when standing up almost every day. Symptoms have been intermittent, but have acutely worsened. She describes it as a spinning sensation accompanied by pre-syncope. Her PCP took her off of Jardiance last month because pt felt it was causing these lightheaded symptoms. No chest pain, nausea, vomiting, palpitations, vision changes, abdominal pain, urinary complaints or other symptoms with this. She  is taking her medications as prescribed.     4/17: Patient seen.  Looks clinically very dry, dry skin, dry mucous membranes.  Will DC Lasix and Aldactone for now.  Relax fluid restriction.  Check orthostatic vital signs.  Renal function also looks a little worse than January.  Will try to avoid IV boluses.  PT and OT evaluation and treatment.  Will likely need skilled nursing on discharge.  May have limited options for SNF per insurance.    4/18: Patient seen.  Appears to be less clinically dry today.  BUN and creatinine are slightly improved.  Still wished to avoid fluid boluses if possible.  Continue to hold diuretics.  Patient states he still feels dizzy today but a little bit less so.  No nystagmus on exam.  Patient was symptomatic with orthostatic vital signs but did not have significant change in her vitals yesterday.  Discharge planning in progress for skilled nursing facility.    4/19: Patient seen.  Seems more euvolemic at present.  Continue to hold diuretics for now.  Continue to work with PT and OT.  Selected to skilled nursing facility, awaiting authorization per insurance.  No new complaints at present.  Reassess in AM.       Objective     Last Recorded Vitals  /68 (BP Location: Left arm, Patient Position: Lying)   Pulse 91   Temp 36.3 °C (97.4 °F) (Temporal)   Resp 16   Wt 52.6 kg (115 lb 15.4 oz)   SpO2 93%   Intake/Output last 3 Shifts:    Intake/Output Summary (Last 24 hours) at 4/19/2025 1414  Last data filed at 4/19/2025 1300  Gross per 24 hour   Intake 240 ml   Output 0 ml   Net 240 ml       Admission Weight  Weight: 49.9 kg (110 lb) (04/16/25 1750)    Daily Weight  04/19/25 : 52.6 kg (115 lb 15.4 oz)      Physical Exam  Constitutional:       General: She is not in acute distress.  HENT:      Head: Normocephalic and atraumatic.      Nose: Nose normal. No congestion or rhinorrhea.      Mouth/Throat:      Mouth: Mucous membranes are dry.      Pharynx: Oropharynx is clear.   Eyes:       General: No scleral icterus.     Extraocular Movements: Extraocular movements intact.      Pupils: Pupils are equal, round, and reactive to light.   Cardiovascular:      Rate and Rhythm: Normal rate and regular rhythm.      Heart sounds: Normal heart sounds. No murmur heard.     No friction rub. No gallop.   Pulmonary:      Effort: Pulmonary effort is normal.      Breath sounds: Normal breath sounds. No wheezing, rhonchi or rales.   Chest:      Chest wall: No tenderness.   Abdominal:      General: There is no distension.      Palpations: Abdomen is soft.      Tenderness: There is no abdominal tenderness. There is no guarding or rebound.   Musculoskeletal:         General: No swelling, tenderness or signs of injury. Normal range of motion.      Cervical back: Normal range of motion.   Skin:     General: Skin is warm and dry.      Coloration: Skin is not jaundiced.      Findings: No bruising, erythema or rash.   Neurological:      General: No focal deficit present.      Mental Status: She is alert and oriented to person, place, and time.        Lab Results  Results for orders placed or performed during the hospital encounter of 04/16/25 (from the past 24 hours)   POCT GLUCOSE   Result Value Ref Range    POCT Glucose 59 (L) 74 - 99 mg/dL   POCT GLUCOSE   Result Value Ref Range    POCT Glucose 191 (H) 74 - 99 mg/dL   POCT GLUCOSE   Result Value Ref Range    POCT Glucose 247 (H) 74 - 99 mg/dL   Basic Metabolic Panel   Result Value Ref Range    Glucose 112 (H) 74 - 99 mg/dL    Sodium 139 136 - 145 mmol/L    Potassium 4.3 3.5 - 5.3 mmol/L    Chloride 106 98 - 107 mmol/L    Bicarbonate 26 21 - 32 mmol/L    Anion Gap 11 10 - 20 mmol/L    Urea Nitrogen 32 (H) 6 - 23 mg/dL    Creatinine 1.27 (H) 0.50 - 1.05 mg/dL    eGFR 39 (L) >60 mL/min/1.73m*2    Calcium 8.4 (L) 8.6 - 10.3 mg/dL   POCT GLUCOSE   Result Value Ref Range    POCT Glucose 121 (H) 74 - 99 mg/dL   POCT GLUCOSE   Result Value Ref Range    POCT Glucose 228 (H) 74 -  99 mg/dL        Image Results  Transthoracic Echo (TTE) Limited                [facility]  [address] [place], [place] [id number]       Phone [phone] Fax [phone]    TRANSTHORACIC ECHOCARDIOGRAM REPORT    Patient Name:       DEREK COMER DOUGLAS     Reading Physician:    [id number] Jag Aguirre DO  Study Date:         4/17/2025            Ordering Provider:    [id number] CONSTANTINO GARCIA  MRN/PID:            [id number]             Fellow:  Accession#:         [id number]         Nurse:  Date of Birth/Age:  4/12/1929 / 89 years Sonographer:          Rasheeda Pack RDCS  Gender Assigned at  F                    Additional Staff:  Birth:  Height:             152.40 cm            Admit Date:           4/16/2025  Weight:             49.90 kg             Admission Status:     Inpatient -                                                                 Routine  BSA / BMI:          1.45 m2 / 21.48      Department Location:  [facility]                      kg/m2  Blood Pressure: 108 /66 mmHg    Study Type:    TRANSTHORACIC ECHO (TTE) LIMITED  Diagnosis/ICD: Dyspnea, unspecified-R06.00; Elevated Troponin-R79.89  Indication:    Dyspnea, Elevated Troponin  CPT Codes:     Echo Limited-54591; Doppler Limited-31820; Color Doppler-88243    Patient History:  Pertinent History: CHF, HTN, Hyperlipidemia, A-Fib, Previous SVT and Dyspnea.    Study Detail: The following Echo studies were performed: 2D, Doppler and color                flow. Unable to obtain suprasternal notch view.       PHYSICIAN INTERPRETATION:  Left Ventricle: The left ventricular systolic function is normal, with a Escalante's biplane calculated ejection fraction of 66%. There are no regional wall motion abnormalities. The left ventricular cavity size is normal. Spectral Doppler shows a Grade II (pseudonormal pattern) of left ventricular  diastolic filling with an elevated left atrial pressure.  Left Atrium: The left atrial size was not assessed.  Right Ventricle: The right ventricle is upper limits of normal in size. There is mildly reduced right ventricular systolic function.  Right Atrium: The right atrial size was not assessed.  Aortic Valve: The aortic valve is trileaflet. Aortic valve regurgitation was not assessed.  Mitral Valve: The mitral valve is mild to moderately thickened. There is mild mitral valve regurgitation.  Tricuspid Valve: The tricuspid valve is structurally normal. There is moderate tricuspid regurgitation. The Doppler estimated RVSP is mildly elevated right ventricular systolic pressure at 38.3 mmHg.  Pulmonic Valve: The pulmonic valve was not assessed. The pulmonic valve regurgitation was not assessed.  Pericardium: Pericardial effusion was not assessed.  Aorta: The aortic root was not assessed.       CONCLUSIONS:   1. The left ventricular systolic function is normal, with a Escalante's biplane calculated ejection fraction of 66%.   2. Spectral Doppler shows a Grade II (pseudonormal pattern) of left ventricular diastolic filling with an elevated left atrial pressure.   3. There is mildly reduced right ventricular systolic function.   4. Right ventricle is upper limits of normal in size.   5. Mildly elevated right ventricular systolic pressure.   6. Moderate tricuspid regurgitation.    QUANTITATIVE DATA SUMMARY:     2D MEASUREMENTS:             Normal Ranges:  LVEDV Index:     20.18 ml/m2       LV SYSTOLIC FUNCTION:                       Normal Ranges:  EF-A4C View:    62 % (>=55%)  EF-A2C View:    72 %  EF-Biplane:     66 %  LV EF Reported: 66 %       LV DIASTOLIC FUNCTION:           Normal Ranges:  MV Peak E:             1.07 m/s  (0.7-1.2 m/s)  MV Peak A:             0.33 m/s  (0.42-0.7 m/s)  E/A Ratio:             3.28      (1.0-2.2)  MV e'                  0.052 m/s (>8.0)  MV lateral e'          0.05 m/s  MV medial e'            0.06 m/s  E/e' Ratio:            20.48     (<8.0)       MITRAL VALVE:          Normal Ranges:  MV DT:        158 msec (150-240msec)       RIGHT VENTRICLE:  RV Basal 3.70 cm  RV Mid   2.20 cm  RV Major 4.7 cm  TAPSE:   13.1 mm  RV s'    0.09 m/s       TRICUSPID VALVE/RVSP:          Normal Ranges:  Peak TR Velocity:     2.97 m/s  RV Syst Pressure:     38 mmHg  (< 30mmHg)  IVC Diam:             1.50 cm       [id number] Jag Carla DO  Electronically signed on 4/17/2025 at 1:19:46 PM       ** Final **  ECG 12 lead  Atrial fibrillation  Borderline low voltage, extremity leads  Borderline ST depression, inferior leads  Borderline prolonged QT interval    See ED provider note for full interpretation and clinical correlation  Confirmed by Patricia Taylor ([id number]) on 4/17/2025 11:48:10 AM       Assessment/Plan     Assessment & Plan  Fall, initial encounter  CT imaging showing chronic T7 fracture, chronic fractures of the superior and inferior pubic rami and sacrum  PT/OT/SS consulted for possible SNF  Gentle hydration overnight followed by orthostatics in the AM.  Also check creatine kinase given how long she was down for.  UA pending, may have UTI.  4/17: Suspect orthostatic hypotension secondary to dehydration.  Continue PT and OT.  Will relax fluid restriction.  Continue to hold Lasix and Aldactone.  No evidence of rhabdomyolysis.  Will likely need skilled nursing on discharge.  Trend BMP.  4/18: Continue slow hydration.  Holding diuretics but avoid boluses.  No nystagmus on exam.  No significant changes on orthostatic vital signs but did feel symptomatic.  Continue PT and OT.  4/19: Gradually rehydrating.  Avoid boluses.  Recheck labs in AM.  Continue PT and OT.  Awaiting authorization for discharge to skilled nursing facility.  Hypothyroidism    Stage 3 chronic kidney disease, unspecified whether stage 3a or 3b CKD (Multi)    (HFpEF) heart failure with preserved ejection fraction  Continue home meds.  Update  echocardiogram  4/17: LVEF was 66%.  Stage II diastolic dysfunction.  Suspect patient is dry at present.  Hold Lasix and Aldactone.  Relax fluid restriction.  4/18: No evidence of acute heart failure.  Type 2 diabetes mellitus  SSI ordered while inpatient  Accuchecks, hypoglycemic protocol  Elevated troponin  Trops 58 -> 64, recheck with  AM labs  No ACS symptoms currently. EKG benign. Check TTE in AM.  4/17: Normal LVEF.  Stage II diastolic dysfunction.  Suspect troponin elevation is secondary to demand ischemia related to her fall and injury.          Manuel Hsu MD

## 2025-04-19 NOTE — ASSESSMENT & PLAN NOTE
CT imaging showing chronic T7 fracture, chronic fractures of the superior and inferior pubic rami and sacrum  PT/OT/SS consulted for possible SNF  Gentle hydration overnight followed by orthostatics in the AM.  Also check creatine kinase given how long she was down for.  UA pending, may have UTI.  4/17: Suspect orthostatic hypotension secondary to dehydration.  Continue PT and OT.  Will relax fluid restriction.  Continue to hold Lasix and Aldactone.  No evidence of rhabdomyolysis.  Will likely need skilled nursing on discharge.  Trend BMP.  4/18: Continue slow hydration.  Holding diuretics but avoid boluses.  No nystagmus on exam.  No significant changes on orthostatic vital signs but did feel symptomatic.  Continue PT and OT.  4/19: Gradually rehydrating.  Avoid boluses.  Recheck labs in AM.  Continue PT and OT.  Awaiting authorization for discharge to skilled nursing facility.

## 2025-04-19 NOTE — CARE PLAN
The patient's goals for the shift include      The clinical goals for the shift include pt to remain hemodynamically stable throughout shift    Over the shift, the patient did not make progress toward the following goals. Barriers to progression include . Recommendations to address these barriers include .

## 2025-04-19 NOTE — PROGRESS NOTES
Occupational Therapy    OT Treatment    Patient Name: Gladys Salas  MRN: [id number]  Department: [address]  Room: 2017/2017-A  Today's Date: 4/19/2025  Time Calculation  Start Time: 1408  Stop Time: 1431  Time Calculation (min): 23 min        Assessment:  OT Assessment: Patient progressing with goals, now CGA with transfers. Patient c/o dizziness during transfers and functional mobility.  End of Session Communication: Bedside nurse  End of Session Patient Position: Up in chair, Alarm on     Plan:  Treatment Interventions: ADL retraining, Functional transfer training, UE strengthening/ROM, Endurance training, Cognitive reorientation, Patient/family training, Neuromuscular reeducation, Equipment evaluation/education  OT Frequency: 3 times per week  OT Discharge Recommendations: Moderate intensity level of continued care  OT - OK to Discharge: Yes  Treatment Interventions: ADL retraining, Functional transfer training, UE strengthening/ROM, Endurance training, Cognitive reorientation, Patient/family training, Neuromuscular reeducation, Equipment evaluation/education    Subjective   Previous Visit Info:  OT Last Visit  OT Received On: 04/19/25  General:  General  Prior to Session Communication: Bedside nurse  Patient Position Received: Bed, 3 rail up, Alarm on  General Comment: Patient c/o dizziness during functional mobility.  Precautions:  Medical Precautions: Fall precautions        Pain:  Pain Assessment  0-10 (Numeric) Pain Score: 0 - No pain    Objective    Cognition:  Cognition  Overall Cognitive Status: Within Functional Limits    Bed Mobility/Transfers: Bed Mobility  Bed Mobility: Yes  Bed Mobility 1  Bed Mobility 1: Supine to sitting  Level of Assistance 1: Minimum assistance    Transfers  Transfer: Yes  Transfer 1  Technique 1: Sit to stand, Stand to sit  Transfer Device 1: Walker  Transfer Level of Assistance 1: Contact guard      Functional Mobility:  Functional Mobility  Functional Mobility Performed:  Yes  Functional Mobility 1  Surface 1: Level tile  Device 1: Rolling walker  Assistance 1: Contact guard    Outcome Measures:[facility] Daily Activity  Putting on and taking off regular lower body clothing: A lot  Bathing (including washing, rinsing, drying): A lot  Putting on and taking off regular upper body clothing: A lot  Toileting, which includes using toilet, bedpan or urinal: A lot  Taking care of personal grooming such as brushing teeth: A little  Eating Meals: A little  Daily Activity - Total Score: 14    Education Documentation  ADL Training, taught by ROSALBA Alexander at 4/19/2025  2:45 PM.  Learner: Patient  Readiness: Acceptance  Method: Explanation  Response: Needs Reinforcement    Education Comments  No comments found.           Goals:  Encounter Problems       Encounter Problems (Active)       ADLs       Patient will complete daily grooming tasks brushing teeth and washing face/hair with set-up, supervision level of assistance and PRN adaptive equipment while supported sitting. (Progressing)       Start:  04/17/25    Expected End:  05/01/25               EXERCISE/STRENGTHENING       Patient with increase BUE by 1/2 MMT grade strength. (Progressing)       Start:  04/17/25    Expected End:  05/01/25               TRANSFERS       Patient will complete functional transfers with least restrictive device with modified independent level of assistance. (Progressing)       Start:  04/17/25    Expected End:  05/01/25

## 2025-04-20 VITALS
HEART RATE: 82 BPM | OXYGEN SATURATION: 100 % | TEMPERATURE: 98.2 F | HEIGHT: 60 IN | BODY MASS INDEX: 22.64 KG/M2 | DIASTOLIC BLOOD PRESSURE: 76 MMHG | RESPIRATION RATE: 18 BRPM | SYSTOLIC BLOOD PRESSURE: 124 MMHG | WEIGHT: 115.3 LBS

## 2025-04-20 LAB
ANION GAP SERPL CALC-SCNC: 11 MMOL/L (ref 10–20)
BUN SERPL-MCNC: 27 MG/DL (ref 6–23)
CALCIUM SERPL-MCNC: 8.4 MG/DL (ref 8.6–10.3)
CHLORIDE SERPL-SCNC: 107 MMOL/L (ref 98–107)
CO2 SERPL-SCNC: 27 MMOL/L (ref 21–32)
CREAT SERPL-MCNC: 1.18 MG/DL (ref 0.5–1.05)
EGFRCR SERPLBLD CKD-EPI 2021: 42 ML/MIN/1.73M*2
GLUCOSE BLD MANUAL STRIP-MCNC: 113 MG/DL (ref 74–99)
GLUCOSE BLD MANUAL STRIP-MCNC: 115 MG/DL (ref 74–99)
GLUCOSE BLD MANUAL STRIP-MCNC: 141 MG/DL (ref 74–99)
GLUCOSE BLD MANUAL STRIP-MCNC: 157 MG/DL (ref 74–99)
GLUCOSE BLD MANUAL STRIP-MCNC: 237 MG/DL (ref 74–99)
GLUCOSE SERPL-MCNC: 104 MG/DL (ref 74–99)
POTASSIUM SERPL-SCNC: 4.3 MMOL/L (ref 3.5–5.3)
SODIUM SERPL-SCNC: 141 MMOL/L (ref 136–145)

## 2025-04-20 PROCEDURE — 99233 SBSQ HOSP IP/OBS HIGH 50: CPT | Performed by: INTERNAL MEDICINE

## 2025-04-20 PROCEDURE — 2500000002 HC RX 250 W HCPCS SELF ADMINISTERED DRUGS (ALT 637 FOR MEDICARE OP, ALT 636 FOR OP/ED): Performed by: STUDENT IN AN ORGANIZED HEALTH CARE EDUCATION/TRAINING PROGRAM

## 2025-04-20 PROCEDURE — 2500000001 HC RX 250 WO HCPCS SELF ADMINISTERED DRUGS (ALT 637 FOR MEDICARE OP): Performed by: STUDENT IN AN ORGANIZED HEALTH CARE EDUCATION/TRAINING PROGRAM

## 2025-04-20 PROCEDURE — 2500000002 HC RX 250 W HCPCS SELF ADMINISTERED DRUGS (ALT 637 FOR MEDICARE OP, ALT 636 FOR OP/ED): Performed by: INTERNAL MEDICINE

## 2025-04-20 PROCEDURE — 1100000001 HC PRIVATE ROOM DAILY

## 2025-04-20 PROCEDURE — 36415 COLL VENOUS BLD VENIPUNCTURE: CPT | Performed by: INTERNAL MEDICINE

## 2025-04-20 PROCEDURE — 82947 ASSAY GLUCOSE BLOOD QUANT: CPT

## 2025-04-20 PROCEDURE — 80048 BASIC METABOLIC PNL TOTAL CA: CPT | Performed by: INTERNAL MEDICINE

## 2025-04-20 PROCEDURE — 2500000004 HC RX 250 GENERAL PHARMACY W/ HCPCS (ALT 636 FOR OP/ED): Performed by: STUDENT IN AN ORGANIZED HEALTH CARE EDUCATION/TRAINING PROGRAM

## 2025-04-20 RX ADMIN — CARVEDILOL 25 MG: 25 TABLET, FILM COATED ORAL at 08:46

## 2025-04-20 RX ADMIN — INSULIN LISPRO 2 UNITS: 100 INJECTION, SOLUTION INTRAVENOUS; SUBCUTANEOUS at 11:51

## 2025-04-20 RX ADMIN — HEPARIN SODIUM 5000 UNITS: 5000 INJECTION, SOLUTION INTRAVENOUS; SUBCUTANEOUS at 21:21

## 2025-04-20 RX ADMIN — CARVEDILOL 25 MG: 25 TABLET, FILM COATED ORAL at 21:21

## 2025-04-20 RX ADMIN — INSULIN LISPRO 1 UNITS: 100 INJECTION, SOLUTION INTRAVENOUS; SUBCUTANEOUS at 15:51

## 2025-04-20 RX ADMIN — HEPARIN SODIUM 5000 UNITS: 5000 INJECTION, SOLUTION INTRAVENOUS; SUBCUTANEOUS at 06:25

## 2025-04-20 RX ADMIN — PANTOPRAZOLE SODIUM 40 MG: 40 TABLET, DELAYED RELEASE ORAL at 08:46

## 2025-04-20 RX ADMIN — PAROXETINE HYDROCHLORIDE 20 MG: 20 TABLET, FILM COATED ORAL at 21:21

## 2025-04-20 RX ADMIN — LEVOTHYROXINE SODIUM 50 MCG: 0.05 TABLET ORAL at 06:25

## 2025-04-20 RX ADMIN — HEPARIN SODIUM 5000 UNITS: 5000 INJECTION, SOLUTION INTRAVENOUS; SUBCUTANEOUS at 14:09

## 2025-04-20 ASSESSMENT — ENCOUNTER SYMPTOMS
DYSURIA: 0
VOMITING: 0
NAUSEA: 0
ABDOMINAL PAIN: 0
DIARRHEA: 0
CHEST TIGHTNESS: 0
WHEEZING: 0
FACIAL SWELLING: 0
HALLUCINATIONS: 0
AGITATION: 0
CHILLS: 0
JOINT SWELLING: 0
HEMATURIA: 0
DIZZINESS: 1
CONFUSION: 0
SHORTNESS OF BREATH: 0
DIAPHORESIS: 0
SORE THROAT: 0
LIGHT-HEADEDNESS: 1
COUGH: 0
WOUND: 0
BACK PAIN: 0
CHOKING: 0
FATIGUE: 0
CONSTIPATION: 0
FEVER: 0
PALPITATIONS: 0
NECK PAIN: 0
WEAKNESS: 0
NERVOUS/ANXIOUS: 0

## 2025-04-20 ASSESSMENT — PAIN SCALES - GENERAL
PAINLEVEL_OUTOF10: 0 - NO PAIN

## 2025-04-20 ASSESSMENT — COGNITIVE AND FUNCTIONAL STATUS - GENERAL
STANDING UP FROM CHAIR USING ARMS: A LOT
DRESSING REGULAR UPPER BODY CLOTHING: A LITTLE
PERSONAL GROOMING: A LITTLE
CLIMB 3 TO 5 STEPS WITH RAILING: A LOT
DRESSING REGULAR LOWER BODY CLOTHING: A LITTLE
MOBILITY SCORE: 15
MOVING TO AND FROM BED TO CHAIR: A LITTLE
TURNING FROM BACK TO SIDE WHILE IN FLAT BAD: A LITTLE
HELP NEEDED FOR BATHING: A LITTLE
DAILY ACTIVITIY SCORE: 19
WALKING IN HOSPITAL ROOM: A LOT
MOVING FROM LYING ON BACK TO SITTING ON SIDE OF FLAT BED WITH BEDRAILS: A LITTLE
TOILETING: A LITTLE

## 2025-04-20 ASSESSMENT — PAIN - FUNCTIONAL ASSESSMENT
PAIN_FUNCTIONAL_ASSESSMENT: 0-10

## 2025-04-20 NOTE — DOCUMENTATION CLARIFICATION NOTE
"    PATIENT:               DEREK COLE  ACCT #:                  [id number]  MRN:                       [id number]  :                       1929  ADMIT DATE:       2025 7:28 PM  DISCH DATE:  RESPONDING PROVIDER #:        [id number]          PROVIDER RESPONSE TEXT:    Demand ischemia    CDI QUERY TEXT:    Clarification        Instruction:    Based on your assessment of the patient and the clinical information, please provide the requested documentation by clicking on the appropriate radio button and enter any additional information if prompted.    Question: Can demand ischemia be further specified as;    When answering this query, please exercise your independent professional judgment. The fact that a question is being asked, does not imply that any particular answer is desired or expected.    The patient's clinical indicators include:  Clinical Information: 25. \"Admit s/p fall. She became acutely lightheaded and fell to the floor on her tailbone. Had headache and tailbone pain\"    Clinical Indicators:  Troponin's: 58,65, 66  25 PN: \"No ACS symptoms currently. EKG benign. Check TTE in AM.  Normal LVEF. Stage II diastolic dysfunction. Suspect troponin elevation is secondary to demand ischemia related to her fall and injury.\"  25 MARZENA: \"CONCLUSIONS:  1. The left ventricular systolic function is normal, with a Escalante's biplane calculated ejection fraction of 66%.  2. Spectral Doppler shows a Grade II (pseudonormal pattern) of left ventricular diastolic filling with an elevated left atrial pressure.  3. There is mildly reduced right ventricular systolic function.  4. Right ventricle is upper limits of normal in size.  5. Mildly elevated right ventricular systolic pressure.  6. Moderate tricuspid regurgitation.\"    Treatment: Troponins, EKG, MARZENA.    Risk Factors: Fall. Age  Options provided:  -- Acute Myocardial Injury  -- Chronic Myocardial Injury  -- Other - I will add my own diagnosis  -- Refer " to Clinical Documentation Reviewer    Query created by: Yesi Betts on 4/18/2025 9:10 AM      Electronically signed by:  CAMILA RAVI MD 4/20/2025 5:19 PM

## 2025-04-20 NOTE — PROGRESS NOTES
Gladys Salas is a 89 y.o. female on day 3 of admission presenting with Fall, initial encounter.    Review of Systems   Constitutional:  Negative for chills, diaphoresis, fatigue and fever.   HENT:  Negative for congestion, facial swelling, sneezing and sore throat.    Respiratory:  Negative for cough, choking, chest tightness, shortness of breath and wheezing.    Cardiovascular:  Negative for chest pain, palpitations and leg swelling.   Gastrointestinal:  Negative for abdominal pain, constipation, diarrhea, nausea and vomiting.   Genitourinary:  Negative for dysuria, hematuria and urgency.   Musculoskeletal:  Negative for back pain, gait problem, joint swelling and neck pain.   Skin:  Negative for rash and wound.   Neurological:  Positive for dizziness and light-headedness. Negative for syncope and weakness.   Psychiatric/Behavioral:  Negative for agitation, confusion and hallucinations. The patient is not nervous/anxious.    All other systems reviewed and are negative.     Subjective   Gladys Salas is a 89 y.o. female with PMHx s/f cerebral hemorrhage, SVT, afib (not on anticoagulation due to pt choice), Hfpef, HTN, HLD, hypothyroidism, CKD presenting with fall and lightheadedness. Pt suffered a fall at her home today when she was reaching under the kitchen sink to get something. She became acutely lightheaded and fell to the floor on her tailbone. Had headache and tailbone pain, but no LOC. It took her about two hours to reach a phone to call for help. Pt denies any further falls, but admits to lightheaded episodes when standing up almost every day. Symptoms have been intermittent, but have acutely worsened. She describes it as a spinning sensation accompanied by pre-syncope. Her PCP took her off of Jardiance last month because pt felt it was causing these lightheaded symptoms. No chest pain, nausea, vomiting, palpitations, vision changes, abdominal pain, urinary complaints or other symptoms with this. She  is taking her medications as prescribed.     4/17: Patient seen.  Looks clinically very dry, dry skin, dry mucous membranes.  Will DC Lasix and Aldactone for now.  Relax fluid restriction.  Check orthostatic vital signs.  Renal function also looks a little worse than January.  Will try to avoid IV boluses.  PT and OT evaluation and treatment.  Will likely need skilled nursing on discharge.  May have limited options for SNF per insurance.    4/18: Patient seen.  Appears to be less clinically dry today.  BUN and creatinine are slightly improved.  Still wished to avoid fluid boluses if possible.  Continue to hold diuretics.  Patient states he still feels dizzy today but a little bit less so.  No nystagmus on exam.  Patient was symptomatic with orthostatic vital signs but did not have significant change in her vitals yesterday.  Discharge planning in progress for skilled nursing facility.    4/19: Patient seen.  Seems more euvolemic at present.  Continue to hold diuretics for now.  Continue to work with PT and OT.  Selected to skilled nursing facility, awaiting authorization per insurance.  No new complaints at present.  Reassess in AM.    4/20: Patient seen.  Still looks clinically dry.  BUN/creatinine are much improved.  Awaiting authorization for discharge to skilled nursing facility.  Patient expresses some impatience at her insurance provider.  Hopefully we can resolve this in the next 24 to 48 hours.  Continue to hold diuretics.       Objective     Last Recorded Vitals  /71 (BP Location: Left arm, Patient Position: Lying)   Pulse 67   Temp 35.9 °C (96.6 °F) (Temporal)   Resp 18   Wt 52.3 kg (115 lb 4.8 oz)   SpO2 97%   Intake/Output last 3 Shifts:    Intake/Output Summary (Last 24 hours) at 4/20/2025 0698  Last data filed at 4/20/2025 1514  Gross per 24 hour   Intake --   Output 0 ml   Net 0 ml       Admission Weight  Weight: 49.9 kg (110 lb) (04/16/25 1750)    Daily Weight  04/20/25 : 52.3 kg (115 lb  4.8 oz)      Physical Exam  Constitutional:       General: She is not in acute distress.  HENT:      Head: Normocephalic and atraumatic.      Nose: Nose normal. No congestion or rhinorrhea.      Mouth/Throat:      Mouth: Mucous membranes are dry.      Pharynx: Oropharynx is clear.   Eyes:      General: No scleral icterus.     Extraocular Movements: Extraocular movements intact.      Pupils: Pupils are equal, round, and reactive to light.   Cardiovascular:      Rate and Rhythm: Normal rate and regular rhythm.      Heart sounds: Normal heart sounds. No murmur heard.     No friction rub. No gallop.   Pulmonary:      Effort: Pulmonary effort is normal.      Breath sounds: Normal breath sounds. No wheezing, rhonchi or rales.   Chest:      Chest wall: No tenderness.   Abdominal:      General: There is no distension.      Palpations: Abdomen is soft.      Tenderness: There is no abdominal tenderness. There is no guarding or rebound.   Musculoskeletal:         General: No swelling, tenderness or signs of injury. Normal range of motion.      Cervical back: Normal range of motion.   Skin:     General: Skin is warm and dry.      Coloration: Skin is not jaundiced.      Findings: No bruising, erythema or rash.   Neurological:      General: No focal deficit present.      Mental Status: She is alert and oriented to person, place, and time.          Lab Results  Results for orders placed or performed during the hospital encounter of 04/16/25 (from the past 24 hours)   POCT GLUCOSE   Result Value Ref Range    POCT Glucose 113 (H) 74 - 99 mg/dL   Basic Metabolic Panel   Result Value Ref Range    Glucose 104 (H) 74 - 99 mg/dL    Sodium 141 136 - 145 mmol/L    Potassium 4.3 3.5 - 5.3 mmol/L    Chloride 107 98 - 107 mmol/L    Bicarbonate 27 21 - 32 mmol/L    Anion Gap 11 10 - 20 mmol/L    Urea Nitrogen 27 (H) 6 - 23 mg/dL    Creatinine 1.18 (H) 0.50 - 1.05 mg/dL    eGFR 42 (L) >60 mL/min/1.73m*2    Calcium 8.4 (L) 8.6 - 10.3 mg/dL    POCT GLUCOSE   Result Value Ref Range    POCT Glucose 115 (H) 74 - 99 mg/dL   POCT GLUCOSE   Result Value Ref Range    POCT Glucose 237 (H) 74 - 99 mg/dL   POCT GLUCOSE   Result Value Ref Range    POCT Glucose 157 (H) 74 - 99 mg/dL        Image Results  Transthoracic Echo (TTE) Limited                [address] [address]       Phone [phone] Fax [phone]    TRANSTHORACIC ECHOCARDIOGRAM REPORT    Patient Name:       DEREK COMER DOUGLAS     Reading Physician:    [id number] Jag Aguirre DO  Study Date:         4/17/2025            Ordering Provider:    [id number] CONSTANTINO GARCIA  MRN/PID:            [id number]             Fellow:  Accession#:         [id number]         Nurse:  Date of Birth/Age:  4/12/1929 / 89 years Sonographer:          Rasheeda Pack RDCS  Gender Assigned at  F                    Additional Staff:  Birth:  Height:             152.40 cm            Admit Date:           4/16/2025  Weight:             49.90 kg             Admission Status:     Inpatient -                                                                 Routine  BSA / BMI:          1.45 m2 / 21.48      Department Location:  [facility]                      kg/m2  Blood Pressure: 108 /66 mmHg    Study Type:    TRANSTHORACIC ECHO (TTE) LIMITED  Diagnosis/ICD: Dyspnea, unspecified-R06.00; Elevated Troponin-R79.89  Indication:    Dyspnea, Elevated Troponin  CPT Codes:     Echo Limited-70528; Doppler Limited-20007; Color Doppler-23147    Patient History:  Pertinent History: CHF, HTN, Hyperlipidemia, A-Fib, Previous SVT and Dyspnea.    Study Detail: The following Echo studies were performed: 2D, Doppler and color                flow. Unable to obtain suprasternal notch view.       PHYSICIAN INTERPRETATION:  Left Ventricle: The left ventricular systolic function is normal, with a  Escalante's biplane calculated ejection fraction of 66%. There are no regional wall motion abnormalities. The left ventricular cavity size is normal. Spectral Doppler shows a Grade II (pseudonormal pattern) of left ventricular diastolic filling with an elevated left atrial pressure.  Left Atrium: The left atrial size was not assessed.  Right Ventricle: The right ventricle is upper limits of normal in size. There is mildly reduced right ventricular systolic function.  Right Atrium: The right atrial size was not assessed.  Aortic Valve: The aortic valve is trileaflet. Aortic valve regurgitation was not assessed.  Mitral Valve: The mitral valve is mild to moderately thickened. There is mild mitral valve regurgitation.  Tricuspid Valve: The tricuspid valve is structurally normal. There is moderate tricuspid regurgitation. The Doppler estimated RVSP is mildly elevated right ventricular systolic pressure at 38.3 mmHg.  Pulmonic Valve: The pulmonic valve was not assessed. The pulmonic valve regurgitation was not assessed.  Pericardium: Pericardial effusion was not assessed.  Aorta: The aortic root was not assessed.       CONCLUSIONS:   1. The left ventricular systolic function is normal, with a Escalante's biplane calculated ejection fraction of 66%.   2. Spectral Doppler shows a Grade II (pseudonormal pattern) of left ventricular diastolic filling with an elevated left atrial pressure.   3. There is mildly reduced right ventricular systolic function.   4. Right ventricle is upper limits of normal in size.   5. Mildly elevated right ventricular systolic pressure.   6. Moderate tricuspid regurgitation.    QUANTITATIVE DATA SUMMARY:     2D MEASUREMENTS:             Normal Ranges:  LVEDV Index:     20.18 ml/m2       LV SYSTOLIC FUNCTION:                       Normal Ranges:  EF-A4C View:    62 % (>=55%)  EF-A2C View:    72 %  EF-Biplane:     66 %  LV EF Reported: 66 %       LV DIASTOLIC FUNCTION:           Normal Ranges:  MV Peak  E:             1.07 m/s  (0.7-1.2 m/s)  MV Peak A:             0.33 m/s  (0.42-0.7 m/s)  E/A Ratio:             3.28      (1.0-2.2)  MV e'                  0.052 m/s (>8.0)  MV lateral e'          0.05 m/s  MV medial e'           0.06 m/s  E/e' Ratio:            20.48     (<8.0)       MITRAL VALVE:          Normal Ranges:  MV DT:        158 msec (150-240msec)       RIGHT VENTRICLE:  RV Basal 3.70 cm  RV Mid   2.20 cm  RV Major 4.7 cm  TAPSE:   13.1 mm  RV s'    0.09 m/s       TRICUSPID VALVE/RVSP:          Normal Ranges:  Peak TR Velocity:     2.97 m/s  RV Syst Pressure:     38 mmHg  (< 30mmHg)  IVC Diam:             1.50 cm       [id number] Jag Aguirre DO  Electronically signed on 4/17/2025 at 1:19:46 PM       ** Final **  ECG 12 lead  Atrial fibrillation  Borderline low voltage, extremity leads  Borderline ST depression, inferior leads  Borderline prolonged QT interval    See ED provider note for full interpretation and clinical correlation  Confirmed by Patricia Taylor ([id number]) on 4/17/2025 11:48:10 AM       Assessment/Plan     Assessment & Plan  Fall, initial encounter  CT imaging showing chronic T7 fracture, chronic fractures of the superior and inferior pubic rami and sacrum  PT/OT/SS consulted for possible SNF  Gentle hydration overnight followed by orthostatics in the AM.  Also check creatine kinase given how long she was down for.  UA pending, may have UTI.  4/17: Suspect orthostatic hypotension secondary to dehydration.  Continue PT and OT.  Will relax fluid restriction.  Continue to hold Lasix and Aldactone.  No evidence of rhabdomyolysis.  Will likely need skilled nursing on discharge.  Trend BMP.  4/18: Continue slow hydration.  Holding diuretics but avoid boluses.  No nystagmus on exam.  No significant changes on orthostatic vital signs but did feel symptomatic.  Continue PT and OT.  4/19: Gradually rehydrating.  Avoid boluses.  Recheck labs in AM.  Continue PT and OT.  Awaiting authorization for  discharge to skilled nursing facility.  Hypothyroidism    Stage 3 chronic kidney disease, unspecified whether stage 3a or 3b CKD (Multi)    (HFpEF) heart failure with preserved ejection fraction  Continue home meds.  Update echocardiogram  4/17: LVEF was 66%.  Stage II diastolic dysfunction.  Suspect patient is dry at present.  Hold Lasix and Aldactone.  Relax fluid restriction.  4/18: No evidence of acute heart failure.  Type 2 diabetes mellitus  SSI ordered while inpatient  Accuchecks, hypoglycemic protocol  Elevated troponin  Trops 58 -> 64, recheck with  AM labs  No ACS symptoms currently. EKG benign. Check TTE in AM.  4/17: Normal LVEF.  Stage II diastolic dysfunction.  Suspect troponin elevation is secondary to demand ischemia related to her fall and injury.          Manuel Hsu MD

## 2025-04-20 NOTE — CARE PLAN
The patient's goals for the shift include      The clinical goals for the shift include Pt will remain hemodynamically stable throughout this shift    Over the shift, the patient did not make progress toward the following goals.

## 2025-04-20 NOTE — DOCUMENTATION CLARIFICATION NOTE
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    Question: Please further clarify the most likely etiology of Back pain on admission after final work up    When answering this query, please exercise your independent professional judgment. The fact that a question is being asked, does not imply that any particular answer is desired or expected.    The patient's clinical indicators include:  Clinical Information: 25 H/P: \"presenting with fall and lightheadedness. Pt suffered a fall at her home today when she was reaching under the kitchen sink to get something. She became acutely lightheaded and fell to the floor on her tailbone. Had headache and tailbone/back pain.\"    Clinical Indicators:  25 CT of spine. \"Vertebrae: Osteopenic bone. Varying degrees of degenerative disc disease, notable for  severe C3-C4 and C4-C5 and C5-C6 disc height loss. There is posterior  disc loss at C6-C7 as well. CT PELVIS: Severe osteopenia. Chronic compression fracture of T7 with 90% height loss and  minimal retropulsion not resulting in canal stenosis.. No acute fracture of the pelvis, sacrum, or proximal right or left  femur. Chronic fractures of the superior and inferior pubic rami and sacrum.\"    Treatment: PT/OT/ Pain management.    Risk Factors: Severe osteopenia per Xray. Chronic fractures of T7, superior and inferior pubic rami " and sacrum. Fall  Options provided:  -- Back pain d/t fall and chronic nontraumatic pathological fractures of thoracic spine, pubic rami, and sacrum  -- Back pain d/t fall  -- Other - I will add my own diagnosis  -- Refer to Clinical Documentation Reviewer    Query created by: Yesi Betts on 4/18/2025 8:56 AM      Electronically signed by:  CAMILA RAVI MD 4/20/2025 5:19 PM

## 2025-04-21 LAB
ANION GAP SERPL CALC-SCNC: 11 MMOL/L (ref 10–20)
ANION GAP SERPL CALC-SCNC: 12 MMOL/L (ref 10–20)
BUN SERPL-MCNC: 30 MG/DL (ref 6–23)
BUN SERPL-MCNC: 36 MG/DL (ref 6–23)
CALCIUM SERPL-MCNC: 7.2 MG/DL (ref 8.6–10.3)
CALCIUM SERPL-MCNC: 8.8 MG/DL (ref 8.6–10.3)
CARDIAC TROPONIN I PNL SERPL HS: 20 NG/L (ref 0–13)
CHLORIDE SERPL-SCNC: 106 MMOL/L (ref 98–107)
CHLORIDE SERPL-SCNC: 111 MMOL/L (ref 98–107)
CO2 SERPL-SCNC: 20 MMOL/L (ref 21–32)
CO2 SERPL-SCNC: 28 MMOL/L (ref 21–32)
CREAT SERPL-MCNC: 1.06 MG/DL (ref 0.5–1.05)
CREAT SERPL-MCNC: 1.85 MG/DL (ref 0.5–1.05)
D DIMER PPP FEU-MCNC: 1642 NG/ML FEU
EGFRCR SERPLBLD CKD-EPI 2021: 25 ML/MIN/1.73M*2
EGFRCR SERPLBLD CKD-EPI 2021: 48 ML/MIN/1.73M*2
ERYTHROCYTE [DISTWIDTH] IN BLOOD BY AUTOMATED COUNT: 13.7 % (ref 11.5–14.5)
ERYTHROCYTE [DISTWIDTH] IN BLOOD BY AUTOMATED COUNT: 13.9 % (ref 11.5–14.5)
ERYTHROCYTE [DISTWIDTH] IN BLOOD BY AUTOMATED COUNT: 14 % (ref 11.5–14.5)
GLUCOSE BLD MANUAL STRIP-MCNC: 124 MG/DL (ref 74–99)
GLUCOSE BLD MANUAL STRIP-MCNC: 171 MG/DL (ref 74–99)
GLUCOSE BLD MANUAL STRIP-MCNC: 205 MG/DL (ref 74–99)
GLUCOSE BLD MANUAL STRIP-MCNC: 286 MG/DL (ref 74–99)
GLUCOSE SERPL-MCNC: 115 MG/DL (ref 74–99)
GLUCOSE SERPL-MCNC: 230 MG/DL (ref 74–99)
HCT VFR BLD AUTO: 19.8 % (ref 36–46)
HCT VFR BLD AUTO: 24.3 % (ref 36–46)
HCT VFR BLD AUTO: 41.3 % (ref 36–46)
HGB BLD-MCNC: 13.1 G/DL (ref 12–16)
HGB BLD-MCNC: 6.4 G/DL (ref 12–16)
HGB BLD-MCNC: 7.8 G/DL (ref 12–16)
LACTATE SERPL-SCNC: 5.3 MMOL/L (ref 0.4–2)
MCH RBC QN AUTO: 29.5 PG (ref 26–34)
MCH RBC QN AUTO: 30.4 PG (ref 26–34)
MCH RBC QN AUTO: 31.1 PG (ref 26–34)
MCHC RBC AUTO-ENTMCNC: 31.7 G/DL (ref 32–36)
MCHC RBC AUTO-ENTMCNC: 32.1 G/DL (ref 32–36)
MCHC RBC AUTO-ENTMCNC: 32.3 G/DL (ref 32–36)
MCV RBC AUTO: 93 FL (ref 80–100)
MCV RBC AUTO: 95 FL (ref 80–100)
MCV RBC AUTO: 96 FL (ref 80–100)
NRBC BLD-RTO: 0 /100 WBCS (ref 0–0)
NRBC BLD-RTO: 0.1 /100 WBCS (ref 0–0)
NRBC BLD-RTO: 0.5 /100 WBCS (ref 0–0)
PLATELET # BLD AUTO: 134 X10*3/UL (ref 150–450)
PLATELET # BLD AUTO: 147 X10*3/UL (ref 150–450)
PLATELET # BLD AUTO: 171 X10*3/UL (ref 150–450)
POTASSIUM SERPL-SCNC: 4.4 MMOL/L (ref 3.5–5.3)
POTASSIUM SERPL-SCNC: 6.2 MMOL/L (ref 3.5–5.3)
RBC # BLD AUTO: 2.06 X10*6/UL (ref 4–5.2)
RBC # BLD AUTO: 2.57 X10*6/UL (ref 4–5.2)
RBC # BLD AUTO: 4.44 X10*6/UL (ref 4–5.2)
SODIUM SERPL-SCNC: 137 MMOL/L (ref 136–145)
SODIUM SERPL-SCNC: 141 MMOL/L (ref 136–145)
WBC # BLD AUTO: 11.9 X10*3/UL (ref 4.4–11.3)
WBC # BLD AUTO: 14.3 X10*3/UL (ref 4.4–11.3)
WBC # BLD AUTO: 4.8 X10*3/UL (ref 4.4–11.3)

## 2025-04-21 PROCEDURE — 2500000001 HC RX 250 WO HCPCS SELF ADMINISTERED DRUGS (ALT 637 FOR MEDICARE OP): Performed by: STUDENT IN AN ORGANIZED HEALTH CARE EDUCATION/TRAINING PROGRAM

## 2025-04-21 PROCEDURE — 2500000004 HC RX 250 GENERAL PHARMACY W/ HCPCS (ALT 636 FOR OP/ED)

## 2025-04-21 PROCEDURE — 2500000004 HC RX 250 GENERAL PHARMACY W/ HCPCS (ALT 636 FOR OP/ED): Performed by: STUDENT IN AN ORGANIZED HEALTH CARE EDUCATION/TRAINING PROGRAM

## 2025-04-21 PROCEDURE — 36415 COLL VENOUS BLD VENIPUNCTURE: CPT | Performed by: INTERNAL MEDICINE

## 2025-04-21 PROCEDURE — 2500000005 HC RX 250 GENERAL PHARMACY W/O HCPCS

## 2025-04-21 PROCEDURE — 2500000002 HC RX 250 W HCPCS SELF ADMINISTERED DRUGS (ALT 637 FOR MEDICARE OP, ALT 636 FOR OP/ED)

## 2025-04-21 PROCEDURE — 97116 GAIT TRAINING THERAPY: CPT | Mod: GP,CQ

## 2025-04-21 PROCEDURE — 74018 RADEX ABDOMEN 1 VIEW: CPT

## 2025-04-21 PROCEDURE — 3E033XZ INTRODUCTION OF VASOPRESSOR INTO PERIPHERAL VEIN, PERCUTANEOUS APPROACH: ICD-10-PCS | Performed by: INTERNAL MEDICINE

## 2025-04-21 PROCEDURE — 85379 FIBRIN DEGRADATION QUANT: CPT

## 2025-04-21 PROCEDURE — 83605 ASSAY OF LACTIC ACID: CPT | Performed by: INTERNAL MEDICINE

## 2025-04-21 PROCEDURE — 2020000001 HC ICU ROOM DAILY

## 2025-04-21 PROCEDURE — 99291 CRITICAL CARE FIRST HOUR: CPT

## 2025-04-21 PROCEDURE — 84484 ASSAY OF TROPONIN QUANT: CPT | Performed by: INTERNAL MEDICINE

## 2025-04-21 PROCEDURE — 85027 COMPLETE CBC AUTOMATED: CPT | Performed by: INTERNAL MEDICINE

## 2025-04-21 PROCEDURE — 99233 SBSQ HOSP IP/OBS HIGH 50: CPT | Performed by: INTERNAL MEDICINE

## 2025-04-21 PROCEDURE — 85027 COMPLETE CBC AUTOMATED: CPT

## 2025-04-21 PROCEDURE — 2500000005 HC RX 250 GENERAL PHARMACY W/O HCPCS: Performed by: INTERNAL MEDICINE

## 2025-04-21 PROCEDURE — 2500000002 HC RX 250 W HCPCS SELF ADMINISTERED DRUGS (ALT 637 FOR MEDICARE OP, ALT 636 FOR OP/ED): Performed by: STUDENT IN AN ORGANIZED HEALTH CARE EDUCATION/TRAINING PROGRAM

## 2025-04-21 PROCEDURE — 97535 SELF CARE MNGMENT TRAINING: CPT | Mod: GO

## 2025-04-21 PROCEDURE — 87040 BLOOD CULTURE FOR BACTERIA: CPT | Mod: PORLAB

## 2025-04-21 PROCEDURE — 97110 THERAPEUTIC EXERCISES: CPT | Mod: GP,CQ

## 2025-04-21 PROCEDURE — 86900 BLOOD TYPING SEROLOGIC ABO: CPT

## 2025-04-21 PROCEDURE — 71045 X-RAY EXAM CHEST 1 VIEW: CPT

## 2025-04-21 PROCEDURE — 99222 1ST HOSP IP/OBS MODERATE 55: CPT | Performed by: INTERNAL MEDICINE

## 2025-04-21 PROCEDURE — 2500000004 HC RX 250 GENERAL PHARMACY W/ HCPCS (ALT 636 FOR OP/ED): Mod: JZ

## 2025-04-21 PROCEDURE — 82947 ASSAY GLUCOSE BLOOD QUANT: CPT

## 2025-04-21 PROCEDURE — 80048 BASIC METABOLIC PNL TOTAL CA: CPT | Performed by: INTERNAL MEDICINE

## 2025-04-21 PROCEDURE — 2500000004 HC RX 250 GENERAL PHARMACY W/ HCPCS (ALT 636 FOR OP/ED): Performed by: INTERNAL MEDICINE

## 2025-04-21 RX ORDER — IPRATROPIUM BROMIDE AND ALBUTEROL SULFATE 2.5; .5 MG/3ML; MG/3ML
3 SOLUTION RESPIRATORY (INHALATION) EVERY 2 HOUR PRN
Status: DISCONTINUED | OUTPATIENT
Start: 2025-04-21 | End: 2025-04-22 | Stop reason: HOSPADM

## 2025-04-21 RX ORDER — DEXTROSE 50 % IN WATER (D50W) INTRAVENOUS SYRINGE
25 ONCE
Status: COMPLETED | OUTPATIENT
Start: 2025-04-21 | End: 2025-04-21

## 2025-04-21 RX ORDER — LORAZEPAM 2 MG/ML
0.5 INJECTION INTRAMUSCULAR EVERY 4 HOURS PRN
Status: DISCONTINUED | OUTPATIENT
Start: 2025-04-21 | End: 2025-04-22 | Stop reason: HOSPADM

## 2025-04-21 RX ORDER — POLYETHYLENE GLYCOL 3350 17 G/17G
17 POWDER, FOR SOLUTION ORAL 2 TIMES DAILY
Status: DISCONTINUED | OUTPATIENT
Start: 2025-04-21 | End: 2025-04-21

## 2025-04-21 RX ORDER — CALCIUM GLUCONATE 20 MG/ML
2 INJECTION, SOLUTION INTRAVENOUS ONCE
Status: COMPLETED | OUTPATIENT
Start: 2025-04-21 | End: 2025-04-21

## 2025-04-21 RX ORDER — HYDROMORPHONE HYDROCHLORIDE 0.2 MG/ML
0.2 INJECTION INTRAMUSCULAR; INTRAVENOUS; SUBCUTANEOUS
Status: DISCONTINUED | OUTPATIENT
Start: 2025-04-21 | End: 2025-04-22 | Stop reason: HOSPADM

## 2025-04-21 RX ORDER — POLYETHYLENE GLYCOL 3350 17 G/17G
17 POWDER, FOR SOLUTION ORAL 2 TIMES DAILY PRN
Status: DISCONTINUED | OUTPATIENT
Start: 2025-04-21 | End: 2025-04-22 | Stop reason: HOSPADM

## 2025-04-21 RX ORDER — NOREPINEPHRINE BITARTRATE/D5W 8 MG/250ML
.01-1 PLASTIC BAG, INJECTION (ML) INTRAVENOUS CONTINUOUS
Status: DISCONTINUED | OUTPATIENT
Start: 2025-04-21 | End: 2025-04-21

## 2025-04-21 RX ORDER — GLYCOPYRROLATE 0.2 MG/ML
0.2 INJECTION INTRAMUSCULAR; INTRAVENOUS EVERY 4 HOURS PRN
Status: DISCONTINUED | OUTPATIENT
Start: 2025-04-21 | End: 2025-04-22 | Stop reason: HOSPADM

## 2025-04-21 RX ORDER — DEXTROSE MONOHYDRATE 100 MG/ML
50 INJECTION, SOLUTION INTRAVENOUS CONTINUOUS
Status: DISCONTINUED | OUTPATIENT
Start: 2025-04-21 | End: 2025-04-21

## 2025-04-21 RX ADMIN — LEVOTHYROXINE SODIUM 50 MCG: 0.05 TABLET ORAL at 06:45

## 2025-04-21 RX ADMIN — HYDROMORPHONE HYDROCHLORIDE 0.4 MG: 0.5 INJECTION, SOLUTION INTRAMUSCULAR; INTRAVENOUS; SUBCUTANEOUS at 23:46

## 2025-04-21 RX ADMIN — CALCIUM GLUCONATE 2 G: 20 INJECTION, SOLUTION INTRAVENOUS at 19:50

## 2025-04-21 RX ADMIN — HYDROMORPHONE HYDROCHLORIDE 0.4 MG: 0.5 INJECTION, SOLUTION INTRAMUSCULAR; INTRAVENOUS; SUBCUTANEOUS at 21:53

## 2025-04-21 RX ADMIN — ONDANSETRON 4 MG: 2 INJECTION INTRAMUSCULAR; INTRAVENOUS at 16:34

## 2025-04-21 RX ADMIN — INSULIN HUMAN 10 UNITS: 100 INJECTION, SOLUTION PARENTERAL at 20:04

## 2025-04-21 RX ADMIN — HEPARIN SODIUM 5000 UNITS: 5000 INJECTION, SOLUTION INTRAVENOUS; SUBCUTANEOUS at 06:45

## 2025-04-21 RX ADMIN — POLYETHYLENE GLYCOL 3350 17 G: 17 POWDER, FOR SOLUTION ORAL at 11:33

## 2025-04-21 RX ADMIN — PANTOPRAZOLE SODIUM 40 MG: 40 TABLET, DELAYED RELEASE ORAL at 09:48

## 2025-04-21 RX ADMIN — HEPARIN SODIUM 5000 UNITS: 5000 INJECTION, SOLUTION INTRAVENOUS; SUBCUTANEOUS at 16:34

## 2025-04-21 RX ADMIN — SODIUM CHLORIDE, SODIUM LACTATE, POTASSIUM CHLORIDE, AND CALCIUM CHLORIDE 1000 ML: .6; .31; .03; .02 INJECTION, SOLUTION INTRAVENOUS at 19:42

## 2025-04-21 RX ADMIN — PIPERACILLIN SODIUM AND TAZOBACTAM SODIUM 3.38 G: 3; .375 INJECTION, SOLUTION INTRAVENOUS at 20:25

## 2025-04-21 RX ADMIN — HYDROMORPHONE HYDROCHLORIDE 0.4 MG: 0.5 INJECTION, SOLUTION INTRAMUSCULAR; INTRAVENOUS; SUBCUTANEOUS at 23:06

## 2025-04-21 RX ADMIN — NOREPINEPHRINE BITARTRATE 0.01 MCG/KG/MIN: 8 INJECTION, SOLUTION INTRAVENOUS at 18:21

## 2025-04-21 RX ADMIN — HYDROCODONE BITARTRATE AND ACETAMINOPHEN 1 TABLET: 5; 325 TABLET ORAL at 11:49

## 2025-04-21 RX ADMIN — CARVEDILOL 25 MG: 25 TABLET, FILM COATED ORAL at 09:48

## 2025-04-21 RX ADMIN — BISACODYL 10 MG: 5 TABLET, COATED ORAL at 11:49

## 2025-04-21 RX ADMIN — DEXTROSE MONOHYDRATE 25 G: 25 INJECTION, SOLUTION INTRAVENOUS at 20:10

## 2025-04-21 ASSESSMENT — ENCOUNTER SYMPTOMS
DYSURIA: 0
CHOKING: 0
FATIGUE: 0
ABDOMINAL DISTENTION: 1
CONSTIPATION: 1
JOINT SWELLING: 0
WHEEZING: 0
NERVOUS/ANXIOUS: 0
FEVER: 0
HALLUCINATIONS: 0
APPETITE CHANGE: 1
DIAPHORESIS: 0
FACIAL SWELLING: 0
SORE THROAT: 0
ABDOMINAL PAIN: 0
BACK PAIN: 0
CONFUSION: 0
SHORTNESS OF BREATH: 0
PALPITATIONS: 0
SHORTNESS OF BREATH: 1
WEAKNESS: 0
CHEST TIGHTNESS: 0
DIARRHEA: 0
CHILLS: 0
WOUND: 0
COUGH: 0
HEMATURIA: 0
DIZZINESS: 1
NECK PAIN: 0
AGITATION: 0
ABDOMINAL PAIN: 1
NAUSEA: 0
LIGHT-HEADEDNESS: 1
CONSTIPATION: 0
VOMITING: 0

## 2025-04-21 ASSESSMENT — COGNITIVE AND FUNCTIONAL STATUS - GENERAL
MOBILITY SCORE: 15
CLIMB 3 TO 5 STEPS WITH RAILING: TOTAL
STANDING UP FROM CHAIR USING ARMS: A LITTLE
EATING MEALS: A LITTLE
HELP NEEDED FOR BATHING: A LOT
MOVING TO AND FROM BED TO CHAIR: A LITTLE
DRESSING REGULAR UPPER BODY CLOTHING: A LOT
TOILETING: TOTAL
DAILY ACTIVITIY SCORE: 12
DRESSING REGULAR LOWER BODY CLOTHING: TOTAL
PERSONAL GROOMING: A LITTLE
MOVING FROM LYING ON BACK TO SITTING ON SIDE OF FLAT BED WITH BEDRAILS: A LITTLE
TURNING FROM BACK TO SIDE WHILE IN FLAT BAD: A LITTLE
WALKING IN HOSPITAL ROOM: A LOT

## 2025-04-21 ASSESSMENT — PAIN SCALES - GENERAL
PAINLEVEL_OUTOF10: 10 - WORST POSSIBLE PAIN
PAINLEVEL_OUTOF10: 7
PAINLEVEL_OUTOF10: 7
PAINLEVEL_OUTOF10: 3
PAINLEVEL_OUTOF10: 0 - NO PAIN
PAINLEVEL_OUTOF10: 8
PAINLEVEL_OUTOF10: 0 - NO PAIN

## 2025-04-21 ASSESSMENT — PAIN DESCRIPTION - ORIENTATION: ORIENTATION: LEFT;LOWER

## 2025-04-21 ASSESSMENT — PAIN - FUNCTIONAL ASSESSMENT
PAIN_FUNCTIONAL_ASSESSMENT: 0-10

## 2025-04-21 ASSESSMENT — PAIN DESCRIPTION - LOCATION
LOCATION: ABDOMEN
LOCATION: BACK

## 2025-04-21 ASSESSMENT — ACTIVITIES OF DAILY LIVING (ADL): HOME_MANAGEMENT_TIME_ENTRY: 33

## 2025-04-21 NOTE — PROGRESS NOTES
Physical Therapy    Physical Therapy Treatment    Patient Name: Gladys Salas  MRN: [id number]  Department: [address]  Room: 2017/2017-A  Today's Date: 4/21/2025  Time Calculation  Start Time: 0903  Stop Time: 0928  Time Calculation (min): 25 min    Assessment/Plan   PT Assessment  Barriers to Discharge Home: Caregiver assistance, Cognition needs, Physical needs  End of Session Communication: Bedside nurse  Assessment Comment: pt c/o fatigue this tx but motivated to attemtp mobility. pt becomes SOB with activity but diminished with rest  End of Session Patient Position: Up in chair, Alarm on  PT Plan  Inpatient/Swing Bed or Outpatient: Inpatient  PT Plan  Treatment/Interventions: Bed mobility, Transfer training, Gait training, Strengthening, Endurance training (VESTIBULAR EX)  PT Plan: Ongoing PT  PT Frequency: 3 times per week  PT Discharge Recommendations: Moderate intensity level of continued care  Equipment Recommended upon Discharge:  (TBD)  PT Recommended Transfer Status: Assist x1, Assist x2 (FWW)  PT - OK to Discharge: Yes (WHENMEDICALLY CLEARED)    General Visit Information:   PT  Visit  PT Received On: 04/21/25  General  Prior to Session Communication: Bedside nurse  Patient Position Received: Up in chair, Alarm off, not on at start of session  General Comment: pt alert and agreeable    Subjective   Precautions:  Precautions  Hearing/Visual Limitations: False Pass  Medical Precautions: Fall precautions     Date/Time Vitals Session Patient Position Pulse Resp SpO2 BP MAP (mmHg)    04/21/25 0903 During PT  --  86  --  99 %  --  --           Objective   Pain:  Pain Assessment  Pain Assessment: 0-10  0-10 (Numeric) Pain Score:  (no rating)  Pain Location:  (left side)  Pain Interventions: Repositioned  Cognition:  Cognition  Orientation Level: Oriented X4     Activity Tolerance:  Activity Tolerance  Endurance: Decreased tolerance for upright activites  Treatments:  Therapeutic Exercise  Therapeutic Exercise Performed:  Yes  Therapeutic Exercise Activity 1: pt completed seated LE exercises: AP x 15 reps, heel raises x 15 reps, LAQ x 15 reps, hip flexion x 15 reps, hip adduction x 15 reps, glut sets x 15 reps    Ambulation/Gait Training  Ambulation/Gait Training Performed: Yes  Ambulation/Gait Training 1  Surface 1: Level tile  Device 1: Rolling walker  Assistance 1: Minimum assistance, Moderate verbal cues  Quality of Gait 1: Diminished heel strike, Decreased step length, Forward flexed posture, Narrow base of support  Comments/Distance (ft) 1: 30 feet  Transfers  Transfer: Yes  Transfer 1  Technique 1: Sit to stand, Stand to sit  Transfer Device 1: Walker  Transfer Level of Assistance 1: Minimum assistance, Minimal verbal cues  Trials/Comments 1: cues for hand placement    Outcome Measures:  [facility] Basic Mobility  Turning from your back to your side while in a flat bed without using bedrails: A little  Moving from lying on your back to sitting on the side of a flat bed without using bedrails: A little  Moving to and from bed to chair (including a wheelchair): A little  Standing up from a chair using your arms (e.g. wheelchair or bedside chair): A little  To walk in hospital room: A lot  Climbing 3-5 steps with railing: Total  Basic Mobility - Total Score: 15    Education Documentation  Mobility Training, taught by Yue Sandra PTA at 4/21/2025  9:39 AM.  Learner: Patient  Readiness: Acceptance  Method: Explanation  Response: Needs Reinforcement    Education Comments  No comments found.        OP EDUCATION:       Encounter Problems       Encounter Problems (Active)       Mobility       STG - Patient will ambulate (Progressing)       Start:  04/17/25    Expected End:  04/28/25       FWW MIN A X2, 50 FT         Goal 1 (Progressing)       Start:  04/17/25    Expected End:  05/08/25       20 REPS RROM INCREASING STRENGTH TO STABILIZE GAIT            PT Transfers       STG - Transfer from bed to chair (Progressing)       Start:   04/17/25    Expected End:  04/21/25       FWW MIN A X1         STG - Patient will transfer sit to and from stand (Progressing)       Start:  04/17/25    Expected End:  04/19/25       MIN  A X1 FWW USING PROPER TECHNIQUE            Pain - Adult

## 2025-04-21 NOTE — ASSESSMENT & PLAN NOTE
CT imaging showing chronic T7 fracture, chronic fractures of the superior and inferior pubic rami and sacrum  PT/OT/SS consulted for possible SNF  Gentle hydration overnight followed by orthostatics in the AM.  Also check creatine kinase given how long she was down for.  UA pending, may have UTI.  4/17: Suspect orthostatic hypotension secondary to dehydration.  Continue PT and OT.  Will relax fluid restriction.  Continue to hold Lasix and Aldactone.  No evidence of rhabdomyolysis.  Will likely need skilled nursing on discharge.  Trend BMP.  4/18: Continue slow hydration.  Holding diuretics but avoid boluses.  No nystagmus on exam.  No significant changes on orthostatic vital signs but did feel symptomatic.  Continue PT and OT.  4/19: Gradually rehydrating.  Avoid boluses.  Recheck labs in AM.  Continue PT and OT.  Awaiting authorization for discharge to skilled nursing facility.  4/21: Unsettled this morning.  Feels short of breath but chest x-ray clear.  Abdominal distention, looks like large stool burden.  Will order laxative.

## 2025-04-21 NOTE — PROGRESS NOTES
Occupational Therapy    OT Treatment    Patient Name: Gladys Salas  MRN: [id number]  Department: [address]  Room: 2017/2017-A  Today's Date: 4/21/2025  Time Calculation  Start Time: 0947  Stop Time: 1030  Time Calculation (min): 43 min        Assessment:  OT Assessment: pt's activity limited today due to acute onset of SOB and more fatigued today. pt c/o feeling winded/SOB sitting and with min exertion. TX focused on self care performance from sitting position. pt progress functional strength through participation in grooming task.  Barriers to Discharge Home: Physical needs, Caregiver assistance  End of Session Communication: Bedside nurse  End of Session Patient Position: Up in chair, Alarm on     Plan:  Treatment Interventions: ADL retraining, Functional transfer training, UE strengthening/ROM, Endurance training, Cognitive reorientation, Patient/family training, Neuromuscular reeducation, Equipment evaluation/education  OT Frequency: 3 times per week  OT Discharge Recommendations: Moderate intensity level of continued care  OT - OK to Discharge: Yes  Treatment Interventions: ADL retraining, Functional transfer training, UE strengthening/ROM, Endurance training, Cognitive reorientation, Patient/family training, Neuromuscular reeducation, Equipment evaluation/education    Subjective   Previous Visit Info:  OT Last Visit  OT Received On: 04/21/25  General:  General  Past Medical History Relevant to Rehab: FELL HIT HEAD, C/O TAILBONE PAIN & HA,LIGHTHEADED FOR A COUPLE WEEKS; DX: BACKPAIN, DEMAND ISCHEMIA, DUNIA, FALL, LIGHTHEADED; HX: TRAUMATIC CEREBRALHEMORRHAGE, SVT, AFIB, HF, HTN, RENAL INSUFFICIANCY, OSTEOPOROSIS, B12/VIT D DEFICIANCY, HYPOTHYROOID, HUMERAL FX, ANXIETY, DM  Family/Caregiver Present: No  Co-Treatment:  (no)  Prior to Session Communication: Bedside nurse  Patient Position Received: Up in chair, Alarm on  General Comment: pt seated in the chair. notable dyspnea sitting in the chair. pt reports  "feeling unwell and fatigued today  Precautions:  Hearing/Visual Limitations: [place]  Medical Precautions: Fall precautions        Vital Signs Comment: pt winded. dyspnea sitting and with minimal exertion. obtained VS cart. difficulty getting a good reading secondary to decreased perfusion in patient's hand. pt's digits notably discolored (purple). RN aware and came to patient's room to evaluate her during OT session. once good wave length on pulse ox obtained, pt saturing %. RN alerted physician of concerns. TX limited secondary to patient's fatigue, dyspnea and endurance     Pain:  Pain Assessment  Pain Assessment: 0-10  0-10 (Numeric) Pain Score:  (\"a little\")  Pain Type: Acute pain  Pain Location: Abdomen  Pain Orientation: Left, Lower  Pain Interventions: Repositioned    Objective    Cognition:  Cognition  Overall Cognitive Status: Within Functional Limits  Arousal/Alertness: Appropriate responses to stimuli  Orientation Level: Oriented X4  Following Commands: Follows all commands and directions without difficulty  Coordination:     Activities of Daily Living: Grooming  Grooming Adaptive Equipment:  (none)  Grooming Level of Assistance: Setup, Minimum assistance, Minimal verbal cues  Grooming Where Assessed: Chair  Grooming Comments: pt brushed hair and performed oral care sitting in chair. pt unable to tolerate standing to ambulation to bathroom, so task performed at seated level. pt required SBA to comb through hair. pt required min A to clean dentures, use mouth wash and rinse mouth. pt used increased effort and time to brush top and bottom dentures using toothbrush as she typically does. pt mild to moderately winded and SOB with exertion. pt required multiple rest breaks. demonstrated decreased functional strength in hands to move brush across dentures with enough force to effectly clean them. OT assisted with thoroughness of completion.  Functional Standing Tolerance:     Bed Mobility/Transfers: Bed " Mobility  Bed Mobility: No    Transfers  Transfer: No (pt declined return to bed and declined mobility at this time)      Functional Mobility:  Functional Mobility  Functional Mobility Performed: No  Sitting Balance:  Static Sitting Balance  Static Sitting-Level of Assistance: Distant supervision  Dynamic Sitting Balance  Dynamic Sitting-Level of Assistance: Distant supervision              Outcome Measures:[facility] Daily Activity  Putting on and taking off regular lower body clothing: Total  Bathing (including washing, rinsing, drying): A lot  Putting on and taking off regular upper body clothing: A lot  Toileting, which includes using toilet, bedpan or urinal: Total  Taking care of personal grooming such as brushing teeth: A little  Eating Meals: A little  Daily Activity - Total Score: 12        Education Documentation  ADL Training, taught by Anita Magana OT at 4/21/2025 12:11 PM.  Learner: Patient  Readiness: Acceptance  Method: Explanation  Response: Needs Reinforcement    Education Comments  No comments found.          Goals:  Encounter Problems       Encounter Problems (Active)       ADLs       Patient will complete daily grooming tasks brushing teeth and washing face/hair with set-up, supervision level of assistance and PRN adaptive equipment while supported sitting. (Progressing)       Start:  04/17/25    Expected End:  05/01/25               EXERCISE/STRENGTHENING       Patient with increase BUE by 1/2 MMT grade strength. (Progressing)       Start:  04/17/25    Expected End:  05/01/25               TRANSFERS       Patient will complete functional transfers with least restrictive device with modified independent level of assistance. (Progressing)       Start:  04/17/25    Expected End:  05/01/25

## 2025-04-21 NOTE — CONSULTS
[facility]    Patient Name: Gladys Salas  Patient : 1929  Room/Bed: -A    Date: 25  Time: 12:26 PM    Referred by Dr. Frank ref. provider found for Fall, Tailbone Pain, and Headache     History Of Present Illness:    Gladys Salas is a 89 y.o. female admitted on 2025 after a fall at home secondary to lightheadedness.  Patient was felt to be dehydrated and several medications were held on admission.  Blood pressures have been stable.  Heart rates generally in the 70 to 80s.  Cardiology consulted given history of atrial fibrillation/flutter.  BMP shows a serum sodium of 141, serum potassium of 4.4, serum creatinine of 1.06.  CBC showed a hemoglobin of 13.1.  BNP was 201.  Troponin was 58-65-66.  Telemetry showing atrial fibrillation with heart rate in the 70s.  Echocardiogram done 2025 showed normal left ventricular systolic function with an ejection fraction of 66%, grade 2 diastolic dysfunction, mildly reduced right ventricular systolic function, and moderate tricuspid valve regurgitation. CT scan of the head/cervical/thoracic/lumbar spine done 2025 showed no acute intracranial abnormality or calvarial fracture, no acute fracture or traumatic malalignment of the cervical, thoracic, or lumbar spine, chronic compression fracture of T7 with a 90% loss, no acute fracture of the pelvis, sacrum, or proximal right or left femur, chronic fractures of the superior and inferior pubic rami and sacrum, spondylotic changes of the cervical, thoracic, and lumbar spine, no high-grade cervical or thoracic spinal canal stenosis, high-grade L4-5 lumbar spinal canal stenosis unchanged from prior study, bilateral nonobstructing renal calculi and bladder calculi.  Patient denies any current cardiac issues but states that she feels unwell today secondary to abdominal pain and bloating.  Chest x-ray and abdominal x-ray are currently pending.  During my exam patient  was resting in bed.    Assessment/Plan:   1.  Persistent atrial fibrillation/flutter  Patient has a history of persistent atrial fibrillation and is currently in A-fib with controlled heart rates.  Would continue carvedilol for heart rate control.  Patient has declined oral anticoagulation.  She reports understanding of her risk for thromboembolic complications such as stroke.  Would continue with rate control only at this point in time.  No plans for a rhythm control strategy.    2.  HFpEF  The patient has a history of HFpEF but appears euvolemic on exam today.  She had presented with concern for dehydration and spironolactone/furosemide has been held since admission.  Continue to monitor urine output, renal function, and serum electrolytes while here.    3.  Tricuspid valve regurgitation  Echocardiogram done 4/17/2025 showed normal left ventricular systolic function with an ejection fraction of 66%, grade 2 diastolic dysfunction, mildly reduced right ventricular systolic function, and moderate tricuspid valve regurgitation.  Continue to monitor clinically.    4.  Hypertension  Blood pressures appear controlled on exam today.  Continue current dose of carvedilol.    5.  Hypothyroidism  Continue levothyroxine replacement.    6.  Abdominal pain/bloating  Abdominal x-ray pending.  Management per hospitalist service.      Past Medical History:  She has a past medical history of Contact with and (suspected) exposure to covid-19 (11/26/2022), Fall in home (11/26/2022), and Impacted cerumen (04/18/2024).    Past Surgical History:  She has a past surgical history that includes Other surgical history (12/10/2019) and Other surgical history (12/10/2019).      Social History:  She reports that she has never smoked. She has never been exposed to tobacco smoke. She has never used smokeless tobacco. She reports that she does not drink alcohol and does not use drugs.    Family History:  Family History[1]      Allergies:  Lisinopril    Outpatient Medications:  Current Outpatient Medications   Medication Instructions    carvedilol (COREG) 25 mg, oral, 2 times daily    furosemide (LASIX) 40 mg, oral, Daily    levothyroxine (SYNTHROID, LEVOXYL) 50 mcg, oral, Daily before breakfast    PARoxetine (PAXIL) 20 mg, oral, Daily    Prolia 60 mg, subcutaneous, Once    spironolactone (ALDACTONE) 12.5 mg, oral, Daily        ROS:  A 14 point review of systems was done and is negative other than as stated in HPI    Vitals:  Vitals:    04/21/25 0718 04/21/25 0903 04/21/25 0947 04/21/25 1130   BP: 126/74   95/65   BP Location: Left arm   Left arm   Patient Position: Sitting   Lying   Pulse: 90 86 72 76   Resp: 18   18   Temp: 36.1 °C (97 °F)   36.3 °C (97.4 °F)   TempSrc: Temporal   Temporal   SpO2: 100% 99%  96%   Weight:       Height:           Physical Exam:     Constitutional: Cooperative, in no acute distress, alert, appears stated age.  Skin: Skin color, texture, turgor normal. No rashes or lesions.  Head: Normocephalic. No masses, lesions, tenderness or abnormalities  Eyes: Extraocular movements are grossly intact.  Mouth and throat: Mucous membranes moist  Neck: Neck supple, no carotid bruits, no JVD  Respiratory: Lungs clear to auscultation, no wheezing or rhonchi, no use of accessory muscles  Chest wall: No scars, normal excursion with respiration  Cardiovascular: Irregular rhythm with murmur  Gastrointestinal: Abdomen soft, mildly tender with palpation. Bowel sounds normal.  Musculoskeletal: Strength equal in upper extremities  Extremities: No pitting edema  Neurologic: Sensation grossly intact, alert and oriented ×3    Intake/Output:   No intake/output data recorded.    Outpatient Medications  Medications Ordered Prior to Encounter[2]    Scheduled medications  Scheduled Medications[3]  Continuous medications  Continuous Medications[4]  PRN medications  PRN Medications[5]   Prescriptions Prior to Admission[6]    Recent Labs:  (past 2 days)  Recent Results (from the past 48 hours)   POCT GLUCOSE    Collection Time: 04/19/25  2:45 PM   Result Value Ref Range    POCT Glucose 45 (L) 74 - 99 mg/dL   POCT GLUCOSE    Collection Time: 04/19/25  3:52 PM   Result Value Ref Range    POCT Glucose 118 (H) 74 - 99 mg/dL   POCT GLUCOSE    Collection Time: 04/19/25  9:05 PM   Result Value Ref Range    POCT Glucose 113 (H) 74 - 99 mg/dL   Basic Metabolic Panel    Collection Time: 04/20/25  4:38 AM   Result Value Ref Range    Glucose 104 (H) 74 - 99 mg/dL    Sodium 141 136 - 145 mmol/L    Potassium 4.3 3.5 - 5.3 mmol/L    Chloride 107 98 - 107 mmol/L    Bicarbonate 27 21 - 32 mmol/L    Anion Gap 11 10 - 20 mmol/L    Urea Nitrogen 27 (H) 6 - 23 mg/dL    Creatinine 1.18 (H) 0.50 - 1.05 mg/dL    eGFR 42 (L) >60 mL/min/1.73m*2    Calcium 8.4 (L) 8.6 - 10.3 mg/dL   POCT GLUCOSE    Collection Time: 04/20/25  8:05 AM   Result Value Ref Range    POCT Glucose 115 (H) 74 - 99 mg/dL   POCT GLUCOSE    Collection Time: 04/20/25 11:19 AM   Result Value Ref Range    POCT Glucose 237 (H) 74 - 99 mg/dL   POCT GLUCOSE    Collection Time: 04/20/25  3:48 PM   Result Value Ref Range    POCT Glucose 157 (H) 74 - 99 mg/dL   POCT GLUCOSE    Collection Time: 04/20/25  7:38 PM   Result Value Ref Range    POCT Glucose 141 (H) 74 - 99 mg/dL   CBC    Collection Time: 04/21/25  3:57 AM   Result Value Ref Range    WBC 4.8 4.4 - 11.3 x10*3/uL    nRBC 0.0 0.0 - 0.0 /100 WBCs    RBC 4.44 4.00 - 5.20 x10*6/uL    Hemoglobin 13.1 12.0 - 16.0 g/dL    Hematocrit 41.3 36.0 - 46.0 %    MCV 93 80 - 100 fL    MCH 29.5 26.0 - 34.0 pg    MCHC 31.7 (L) 32.0 - 36.0 g/dL    RDW 13.7 11.5 - 14.5 %    Platelets 134 (L) 150 - 450 x10*3/uL   Basic Metabolic Panel    Collection Time: 04/21/25  3:57 AM   Result Value Ref Range    Glucose 115 (H) 74 - 99 mg/dL    Sodium 141 136 - 145 mmol/L    Potassium 4.4 3.5 - 5.3 mmol/L    Chloride 106 98 - 107 mmol/L    Bicarbonate 28 21 - 32 mmol/L    Anion Gap 11 10 -  20 mmol/L    Urea Nitrogen 30 (H) 6 - 23 mg/dL    Creatinine 1.06 (H) 0.50 - 1.05 mg/dL    eGFR 48 (L) >60 mL/min/1.73m*2    Calcium 8.8 8.6 - 10.3 mg/dL   POCT GLUCOSE    Collection Time: 04/21/25  7:23 AM   Result Value Ref Range    POCT Glucose 124 (H) 74 - 99 mg/dL   POCT GLUCOSE    Collection Time: 04/21/25 11:39 AM   Result Value Ref Range    POCT Glucose 171 (H) 74 - 99 mg/dL       CV Studies:  EKG:  Encounter Date: 04/16/25   ECG 12 lead   Result Value    Ventricular Rate 89    Atrial Rate 108    CA Interval 58    QRS Duration 83    QT Interval 395    QTC Calculation(Bazett) 481    R Axis 50    T Axis 45    QRS Count 14    Q Onset 252    T Offset 450    QTC Fredericia 450    Narrative    Atrial fibrillation  Borderline low voltage, extremity leads  Borderline ST depression, inferior leads  Borderline prolonged QT interval    See ED provider note for full interpretation and clinical correlation  Confirmed by Patricia Taylor ([id number]) on 4/17/2025 11:48:10 AM     Echocardiogram:   [place] [address]  Phone [phone] Fax [phone]    TRANSTHORACIC ECHOCARDIOGRAM REPORT      Patient Name:     DEREK Purdy Physician:   [id number] Jag Aguirre DO  Study Date:       11/25/2022          Referring Physician: THOR OROZCO  MRN/PID:          [id number]            PCP:  Accession/Order#: [id number]           Department Location: [facility]  YOB: 1929           Fellow:  Gender:           F                   Nurse:  Admit Date:       11/23/2022          Sonographer:         Morenita Yan RYLAND  Admission Status: Inpatient - Routine Additional Staff:  Height:           152.40 cm           CC Report to:  Weight:           55.34 kg            Study Type:          Echocardiogram  BSA:              1.51 m2  Blood Pressure: 109 /66 mmHg    Diagnosis/ICD: R55-Syncope  Indication:    Syncope  Procedure/CPT: Echo  Complete w Full Doppler-[id number]    Patient History:  Pertinent History: HTN, HLD.    Study Detail: The following Echo studies were performed: 2D, M-Mode, Doppler and  color flow.      PHYSICIAN INTERPRETATION:  Left Ventricle: Left ventricular systolic function is normal, with an estimated ejection fraction of 65-70%. There are no regional wall motion abnormalities. The left ventricular cavity size is normal. Spectral Doppler shows an impaired relaxation pattern of left ventricular diastolic filling.  Left Atrium: The left atrium is normal in size.  Right Ventricle: The right ventricle is normal in size. There is normal right ventricular global systolic function.  Right Atrium: The right atrium is normal in size.  Aortic Valve: The aortic valve is trileaflet. There is no evidence of aortic valve regurgitation. The peak instantaneous gradient of the aortic valve is 11.1 mmHg. The mean gradient of the aortic valve is 5.4 mmHg.  Mitral Valve: The mitral valve is mildly thickened. There is trace mitral valve regurgitation.  Tricuspid Valve: The tricuspid valve is structurally normal. There is trace to mild tricuspid regurgitation.  Pulmonic Valve: The pulmonic valve is structurally normal. There is no indication of pulmonic valve regurgitation.  Pericardium: There is no pericardial effusion noted. There is a pericardial fat pad present.  Aorta: The aortic root is normal.      CONCLUSIONS:  1. Left ventricular systolic function is normal with a 65-70% estimated ejection fraction.  2. Spectral Doppler shows an impaired relaxation pattern of left ventricular diastolic filling.    QUANTITATIVE DATA SUMMARY:  2D MEASUREMENTS:  Normal Ranges:  LAs:           2.78 cm   (2.7-4.0cm)  IVSd:          0.87 cm   (0.6-1.1cm)  LVPWd:         0.87 cm   (0.6-1.1cm)  LVIDd:         4.07 cm   (3.9-5.9cm)  LVIDs:         2.07 cm  LV Mass Index: 70.8 g/m2  LV % FS        49.2 %    LA VOLUME:  Normal Ranges:  LA Vol A4C:        36.0 ml     (22+/-6mL/m2)  LA Vol A2C:        21.6 ml  LA Vol BP:         28.5 ml  LA Vol Index A4C:  23.8 ml/m2  LA Vol Index A2C:  14.3 ml/m2  LA Vol Index BP:   18.8 ml/m2  LA Area A4C:       14.1 cm2  LA Area A2C:       10.7 cm2  LA Major Axis A4C: 4.7 cm  LA Major Axis A2C: 4.5 cm  LA Volume Index:   18.0 ml/m2  LA Vol A4C:        35.2 ml  LA Vol A2C:        21.3 ml    RA VOLUME BY A/L METHOD:  Normal Ranges:  RA Area A4C: 10.2 cm2    AORTA MEASUREMENTS:  Normal Ranges:  Ao Sinus, d: 2.40 cm (2.1-3.5cm)  Ao STJ, d:   2.50 cm (1.7-3.4cm)  Asc Ao, d:   3.20 cm (2.1-3.4cm)    LV SYSTOLIC FUNCTION BY 2D PLANIMETRY (MOD):  Normal Ranges:  EF-A4C View: 79.2 % (>=55%)  EF-A2C View: 69.7 %  EF-Biplane:  77.4 %    LV DIASTOLIC FUNCTION:  Normal Ranges:  MV Peak E:        0.82 m/s    (0.7-1.2 m/s)  MV Peak A:        1.18 m/s    (0.42-0.7 m/s)  E/A Ratio:        0.69        (1.0-2.2)  MV e'             0.07 m/s    (>8.0)  MV lateral e'     0.07 m/s  MV medial e'      0.07 m/s  MV A Dur:         122.74 msec  E/e' Ratio:       11.66       (<8.0)  PulmV A Revs Dur: 137.01 msec    MITRAL VALVE:  Normal Ranges:  MV DT: 244 msec (150-240msec)    AORTIC VALVE:  Normal Ranges:  AoV Vmax:                1.67 m/s  (<=1.7m/s)  AoV Peak P.1 mmHg (<20mmHg)  AoV Mean P.4 mmHg  (1.7-11.5mmHg)  LVOT Max Bam:            1.19 m/s  (<=1.1m/s)  AoV VTI:                 32.50 cm  (18-25cm)  LVOT VTI:                25.37 cm  LVOT Diameter:           1.75 cm   (1.8-2.4cm)  AoV Area, VTI:           1.88 cm2  (2.5-5.5cm2)  AoV Area,Vmax:           1.73 cm2  (2.5-4.5cm2)  AoV Dimensionless Index: 0.78    RIGHT VENTRICLE:  RV 1   3.2 cm  RV 2   2.3 cm  RV 3   5.1 cm  TAPSE: 21.1 mm  RV s'  0.15 m/s    TRICUSPID VALVE/RVSP:  Normal Ranges:  Peak TR Velocity: 3.03 m/s  RV Syst Pressure: 39.8 mmHg (< 30mmHg)  TV E Vmax:        0.36 m/s  (0.3-0.7m/s)  TV A Vmax:        0.63 m/s  IVC Diam:         0.80 cm    Pulmonary Veins:  PulmV  A Revs Dur: 137.01 msec      [id number] Jag Aguirre DO  Electronically signed on 11/25/2022 at 3:56:52 PM         Final     Stress Testing HEVER([id number]:1:1825):   NM CARDIAC STRESS REST (MYOCARDIAL PERFUSION MIBI) 04/08/2021    Narrative  MRN: [id number]  Patient Name: DEREK COLE    STUDY:  CARDIAC STRESS/REST INJECTION;  4/8/2021 9:51 am    INDICATION:  Chest pain/Abnormal DSE.    COMPARISON:  None.    ACCESSION NUMBER(S):  [id number]    ORDERING CLINICIAN:  JAG AGUIRRE    TECHNIQUE:  DIVISION OF NUCLEAR MEDICINE  PHARMACOLOGIC STRESS MYOCARDIAL PERFUSION SCAN, ONE DAY PROTOCOL    The patient received an intravenous dose of  10.6 mCi of Tc-99m  tetrofosmin and resting emission tomographic (SPECT) images of the  myocardium were acquired. The patient then received an intravenous  infusion of 0.4mg regadenoson (Lexiscan) followed by an additional  dose of  35.9 mCi of Tc-99m tetrofosmin. Stress phase SPECT images of  the myocardium were then acquired. These included ECG-gated images to  assess and quantify ventricular function.    FINDINGS:  Stress and rest images both demonstrate a normal distribution of  perfusion throughout all LV segments with no sign of ischemia.    ECG-gated images demonstrate normal LV size and myocardial  contractility with an LV ejection fraction of   84 % (normal above 50  percent).    Impression  1. Normal stress myocardial perfusion imaging in response to  pharmacologic stress.  2. Well-maintained left ventricular function.    Cardiac Catheterization: No results found for this or any previous visit from the past 1825 days.  No results found for this or any previous visit from the past 3650 days.     Cardiac Scoring: No results found for this or any previous visit from the past 1825 days.    AAA : No results found for this or any previous visit from the past 1825 days.    OTHER: No results found for this or any previous visit from the past 1825 days.    LAST IMAGING RESULTS  Transthoracic  Echo (TTE) Limited                [address] [address]       Phone [phone] Fax [phone]    TRANSTHORACIC ECHOCARDIOGRAM REPORT    Patient Name:       DEREK COLE     Reading Physician:    [id number] Jag Aguirre DO  Study Date:         4/17/2025            Ordering Provider:    [id number] CONSTANTINO MISHA                                                                 RADHA  MRN/PID:            [id number]             Fellow:  Accession#:         [id number]         Nurse:  Date of Birth/Age:  4/12/1929 / 89 years Sonographer:          Rasheeda Pack RDCS  Gender Assigned at  F                    Additional Staff:  Birth:  Height:             152.40 cm            Admit Date:           4/16/2025  Weight:             49.90 kg             Admission Status:     Inpatient -                                                                 Routine  BSA / BMI:          1.45 m2 / 21.48      Department Location:  [facility]                      kg/m2  Blood Pressure: 108 /66 mmHg    Study Type:    TRANSTHORACIC ECHO (TTE) LIMITED  Diagnosis/ICD: Dyspnea, unspecified-R06.00; Elevated Troponin-R79.89  Indication:    Dyspnea, Elevated Troponin  CPT Codes:     Echo Limited-73926; Doppler Limited-80465; Color Doppler-93244    Patient History:  Pertinent History: CHF, HTN, Hyperlipidemia, A-Fib, Previous SVT and Dyspnea.    Study Detail: The following Echo studies were performed: 2D, Doppler and color                flow. Unable to obtain suprasternal notch view.       PHYSICIAN INTERPRETATION:  Left Ventricle: The left ventricular systolic function is normal, with a Escalante's biplane calculated ejection fraction of 66%. There are no regional wall motion abnormalities. The left ventricular cavity size is normal. Spectral Doppler shows a Grade II (pseudonormal pattern) of left ventricular diastolic filling with an elevated left atrial  pressure.  Left Atrium: The left atrial size was not assessed.  Right Ventricle: The right ventricle is upper limits of normal in size. There is mildly reduced right ventricular systolic function.  Right Atrium: The right atrial size was not assessed.  Aortic Valve: The aortic valve is trileaflet. Aortic valve regurgitation was not assessed.  Mitral Valve: The mitral valve is mild to moderately thickened. There is mild mitral valve regurgitation.  Tricuspid Valve: The tricuspid valve is structurally normal. There is moderate tricuspid regurgitation. The Doppler estimated RVSP is mildly elevated right ventricular systolic pressure at 38.3 mmHg.  Pulmonic Valve: The pulmonic valve was not assessed. The pulmonic valve regurgitation was not assessed.  Pericardium: Pericardial effusion was not assessed.  Aorta: The aortic root was not assessed.       CONCLUSIONS:   1. The left ventricular systolic function is normal, with a Escalante's biplane calculated ejection fraction of 66%.   2. Spectral Doppler shows a Grade II (pseudonormal pattern) of left ventricular diastolic filling with an elevated left atrial pressure.   3. There is mildly reduced right ventricular systolic function.   4. Right ventricle is upper limits of normal in size.   5. Mildly elevated right ventricular systolic pressure.   6. Moderate tricuspid regurgitation.    QUANTITATIVE DATA SUMMARY:     2D MEASUREMENTS:             Normal Ranges:  LVEDV Index:     20.18 ml/m2       LV SYSTOLIC FUNCTION:                       Normal Ranges:  EF-A4C View:    62 % (>=55%)  EF-A2C View:    72 %  EF-Biplane:     66 %  LV EF Reported: 66 %       LV DIASTOLIC FUNCTION:           Normal Ranges:  MV Peak E:             1.07 m/s  (0.7-1.2 m/s)  MV Peak A:             0.33 m/s  (0.42-0.7 m/s)  E/A Ratio:             3.28      (1.0-2.2)  MV e'                  0.052 m/s (>8.0)  MV lateral e'          0.05 m/s  MV medial e'           0.06 m/s  E/e' Ratio:            20.48      (<8.0)       MITRAL VALVE:          Normal Ranges:  MV DT:        158 msec (150-240msec)       RIGHT VENTRICLE:  RV Basal 3.70 cm  RV Mid   2.20 cm  RV Major 4.7 cm  TAPSE:   13.1 mm  RV s'    0.09 m/s       TRICUSPID VALVE/RVSP:          Normal Ranges:  Peak TR Velocity:     2.97 m/s  RV Syst Pressure:     38 mmHg  (< 30mmHg)  IVC Diam:             1.50 cm       [id number] Jag Aguirre DO  Electronically signed on 4/17/2025 at 1:19:46 PM       ** Final **  ECG 12 lead  Atrial fibrillation  Borderline low voltage, extremity leads  Borderline ST depression, inferior leads  Borderline prolonged QT interval    See ED provider note for full interpretation and clinical correlation  Confirmed by Patricia Taylor ([id number]) on 4/17/2025 11:48:10 AM        Jag Aguirre DO, FACC, FACOI  4/21/2025  12:26 PM         [1] No family history on file.  [2]   No current facility-administered medications on file prior to encounter.     Current Outpatient Medications on File Prior to Encounter   Medication Sig Dispense Refill    carvedilol (Coreg) 25 mg tablet TAKE 1 TABLET TWICE A  tablet 3    denosumab (Prolia) 60 mg/mL syringe Inject 1 mL (60 mg total) under the skin 1 time for 1 dose. (Patient not taking: Reported on 2/5/2025) 1 mL 1    furosemide (Lasix) 40 mg tablet Take 1 tablet (40 mg) by mouth once daily. 90 tablet 3    levothyroxine (Synthroid, Levoxyl) 50 mcg tablet TAKE 1 TABLET DAILY IN THE MORNING BEFORE A MEAL 90 tablet 3    PARoxetine (Paxil) 20 mg tablet Take 1 tablet (20 mg) by mouth once daily. 90 tablet 3    spironolactone (Aldactone) 25 mg tablet Take 0.5 tablets (12.5 mg) by mouth once daily. 45 tablet 1   [3] carvedilol, 25 mg, oral, BID  heparin (porcine), 5,000 Units, subcutaneous, q8h RENA  insulin lispro, 0-5 Units, subcutaneous, TID AC  levothyroxine, 50 mcg, oral, Daily  pantoprazole, 40 mg, oral, Daily   Or  pantoprazole, 40 mg, intravenous, Daily  PARoxetine, 20 mg, oral, q PM  perflutren lipid  microspheres, 0.5-10 mL of dilution, intravenous, Once in imaging  perflutren protein A microsphere, 0.5 mL, intravenous, Once in imaging  perflutren protein A microsphere, 0.5 mL, intravenous, Once in imaging  polyethylene glycol, 17 g, oral, BID  [Held by provider] spironolactone, 12.5 mg, oral, Daily  sulfur hexafluoride microsphr, 2 mL, intravenous, Once in imaging    [4]    [5] PRN medications: acetaminophen, bisacodyl, dextrose, dextrose, glucagon, glucagon, guaiFENesin, HYDROcodone-acetaminophen, melatonin, ondansetron **OR** ondansetron  [6]   Medications Prior to Admission   Medication Sig Dispense Refill Last Dose/Taking    carvedilol (Coreg) 25 mg tablet TAKE 1 TABLET TWICE A  tablet 3     denosumab (Prolia) 60 mg/mL syringe Inject 1 mL (60 mg total) under the skin 1 time for 1 dose. (Patient not taking: Reported on 2/5/2025) 1 mL 1     furosemide (Lasix) 40 mg tablet Take 1 tablet (40 mg) by mouth once daily. 90 tablet 3     levothyroxine (Synthroid, Levoxyl) 50 mcg tablet TAKE 1 TABLET DAILY IN THE MORNING BEFORE A MEAL 90 tablet 3     PARoxetine (Paxil) 20 mg tablet Take 1 tablet (20 mg) by mouth once daily. 90 tablet 3     spironolactone (Aldactone) 25 mg tablet Take 0.5 tablets (12.5 mg) by mouth once daily. 45 tablet 1

## 2025-04-21 NOTE — PROGRESS NOTES
Gladys Salas is a 89 y.o. female on day 4 of admission presenting with Fall, initial encounter.    Review of Systems   Constitutional:  Negative for chills, diaphoresis, fatigue and fever.   HENT:  Negative for congestion, facial swelling, sneezing and sore throat.    Respiratory:  Negative for cough, choking, chest tightness, shortness of breath and wheezing.    Cardiovascular:  Negative for chest pain, palpitations and leg swelling.   Gastrointestinal:  Negative for abdominal pain, constipation, diarrhea, nausea and vomiting.   Genitourinary:  Negative for dysuria, hematuria and urgency.   Musculoskeletal:  Negative for back pain, gait problem, joint swelling and neck pain.   Skin:  Negative for rash and wound.   Neurological:  Positive for dizziness and light-headedness. Negative for syncope and weakness.   Psychiatric/Behavioral:  Negative for agitation, confusion and hallucinations. The patient is not nervous/anxious.    All other systems reviewed and are negative.     Subjective   Gladys Salas is a 89 y.o. female with PMHx s/f cerebral hemorrhage, SVT, afib (not on anticoagulation due to pt choice), Hfpef, HTN, HLD, hypothyroidism, CKD presenting with fall and lightheadedness. Pt suffered a fall at her home today when she was reaching under the kitchen sink to get something. She became acutely lightheaded and fell to the floor on her tailbone. Had headache and tailbone pain, but no LOC. It took her about two hours to reach a phone to call for help. Pt denies any further falls, but admits to lightheaded episodes when standing up almost every day. Symptoms have been intermittent, but have acutely worsened. She describes it as a spinning sensation accompanied by pre-syncope. Her PCP took her off of Jardiance last month because pt felt it was causing these lightheaded symptoms. No chest pain, nausea, vomiting, palpitations, vision changes, abdominal pain, urinary complaints or other symptoms with this. She  is taking her medications as prescribed.     4/17: Patient seen.  Looks clinically very dry, dry skin, dry mucous membranes.  Will DC Lasix and Aldactone for now.  Relax fluid restriction.  Check orthostatic vital signs.  Renal function also looks a little worse than January.  Will try to avoid IV boluses.  PT and OT evaluation and treatment.  Will likely need skilled nursing on discharge.  May have limited options for SNF per insurance.    4/18: Patient seen.  Appears to be less clinically dry today.  BUN and creatinine are slightly improved.  Still wished to avoid fluid boluses if possible.  Continue to hold diuretics.  Patient states he still feels dizzy today but a little bit less so.  No nystagmus on exam.  Patient was symptomatic with orthostatic vital signs but did not have significant change in her vitals yesterday.  Discharge planning in progress for skilled nursing facility.    4/19: Patient seen.  Seems more euvolemic at present.  Continue to hold diuretics for now.  Continue to work with PT and OT.  Selected to skilled nursing facility, awaiting authorization per insurance.  No new complaints at present.  Reassess in AM.    4/20: Patient seen.  Still looks clinically dry.  BUN/creatinine are much improved.  Awaiting authorization for discharge to skilled nursing facility.  Patient expresses some impatience at her insurance provider.  Hopefully we can resolve this in the next 24 to 48 hours.  Continue to hold diuretics.    4/21: Called to see patient, feels unsettled this morning.  Abdominal bloating.  Feels short of breath although not hypoxic.  EKG shows atrial fibrillation with possible flutter.  Heart rate appears controlled.  No peripheral edema.  Chest x-ray looks clear.  Abdominal x-ray looks like large stool burden.  Adding laxative.  BUN and creatinine still looks a little dry.  No Rales on exam.  Family requests cardiology evaluation.  Consult placed to outpatient cardiologist.  Still waiting on  insurance to authorize discharge to skilled nursing facility.       Objective     Last Recorded Vitals  /74 (BP Location: Left arm, Patient Position: Sitting)   Pulse 86   Temp 36.1 °C (97 °F) (Temporal)   Resp 18   Wt 53.6 kg (118 lb 2.7 oz)   SpO2 99%   Intake/Output last 3 Shifts:  No intake or output data in the 24 hours ending 04/21/25 1148      Admission Weight  Weight: 49.9 kg (110 lb) (04/16/25 1750)    Daily Weight  04/21/25 : 53.6 kg (118 lb 2.7 oz)      Physical Exam  Constitutional:       General: She is not in acute distress.  HENT:      Head: Normocephalic and atraumatic.      Nose: Nose normal. No congestion or rhinorrhea.      Mouth/Throat:      Mouth: Mucous membranes are dry.      Pharynx: Oropharynx is clear.   Eyes:      General: No scleral icterus.     Extraocular Movements: Extraocular movements intact.      Pupils: Pupils are equal, round, and reactive to light.   Cardiovascular:      Rate and Rhythm: Normal rate and regular rhythm.      Heart sounds: Normal heart sounds. No murmur heard.     No friction rub. No gallop.   Pulmonary:      Effort: Pulmonary effort is normal.      Breath sounds: Normal breath sounds. No wheezing, rhonchi or rales.   Chest:      Chest wall: No tenderness.   Abdominal:      General: There is no distension.      Palpations: Abdomen is soft.      Tenderness: There is no abdominal tenderness. There is no guarding or rebound.   Musculoskeletal:         General: No swelling, tenderness or signs of injury. Normal range of motion.      Cervical back: Normal range of motion.   Skin:     General: Skin is warm and dry.      Coloration: Skin is not jaundiced.      Findings: No bruising, erythema or rash.   Neurological:      General: No focal deficit present.      Mental Status: She is alert and oriented to person, place, and time.          Lab Results  Results for orders placed or performed during the hospital encounter of 04/16/25 (from the past 24 hours)   POCT  GLUCOSE   Result Value Ref Range    POCT Glucose 157 (H) 74 - 99 mg/dL   POCT GLUCOSE   Result Value Ref Range    POCT Glucose 141 (H) 74 - 99 mg/dL   CBC   Result Value Ref Range    WBC 4.8 4.4 - 11.3 x10*3/uL    nRBC 0.0 0.0 - 0.0 /100 WBCs    RBC 4.44 4.00 - 5.20 x10*6/uL    Hemoglobin 13.1 12.0 - 16.0 g/dL    Hematocrit 41.3 36.0 - 46.0 %    MCV 93 80 - 100 fL    MCH 29.5 26.0 - 34.0 pg    MCHC 31.7 (L) 32.0 - 36.0 g/dL    RDW 13.7 11.5 - 14.5 %    Platelets 134 (L) 150 - 450 x10*3/uL   Basic Metabolic Panel   Result Value Ref Range    Glucose 115 (H) 74 - 99 mg/dL    Sodium 141 136 - 145 mmol/L    Potassium 4.4 3.5 - 5.3 mmol/L    Chloride 106 98 - 107 mmol/L    Bicarbonate 28 21 - 32 mmol/L    Anion Gap 11 10 - 20 mmol/L    Urea Nitrogen 30 (H) 6 - 23 mg/dL    Creatinine 1.06 (H) 0.50 - 1.05 mg/dL    eGFR 48 (L) >60 mL/min/1.73m*2    Calcium 8.8 8.6 - 10.3 mg/dL   POCT GLUCOSE   Result Value Ref Range    POCT Glucose 124 (H) 74 - 99 mg/dL        Image Results  Transthoracic Echo (TTE) [place] [id number]       Phone [phone] Fax [phone]    TRANSTHORACIC ECHOCARDIOGRAM REPORT    Patient Name:       DEREK SOULEYMANE Purdy Physician:    [id number] Jag Aguirre DO  Study Date:         4/17/2025            Ordering Provider:    [id number] CONSTANTINO GARCIA  MRN/PID:            [id number]             Fellow:  Accession#:         [id number]         Nurse:  Date of Birth/Age:  4/12/1929 / 89 years Sonographer:          Raseheda Pack RDCS  Gender Assigned at  F                    Additional Staff:  Birth:  Height:             152.40 cm            Admit Date:           4/16/2025  Weight:             49.90 kg             Admission Status:     Inpatient -                                                                  Routine  BSA / BMI:          1.45 m2 / 21.48      Department Location:  [facility]                      kg/m2  Blood Pressure: 108 /66 mmHg    Study Type:    TRANSTHORACIC ECHO (TTE) LIMITED  Diagnosis/ICD: Dyspnea, unspecified-R06.00; Elevated Troponin-R79.89  Indication:    Dyspnea, Elevated Troponin  CPT Codes:     Echo Limited-15812; Doppler Limited-69755; Color Doppler-44022    Patient History:  Pertinent History: CHF, HTN, Hyperlipidemia, A-Fib, Previous SVT and Dyspnea.    Study Detail: The following Echo studies were performed: 2D, Doppler and color                flow. Unable to obtain suprasternal notch view.       PHYSICIAN INTERPRETATION:  Left Ventricle: The left ventricular systolic function is normal, with a Escalante's biplane calculated ejection fraction of 66%. There are no regional wall motion abnormalities. The left ventricular cavity size is normal. Spectral Doppler shows a Grade II (pseudonormal pattern) of left ventricular diastolic filling with an elevated left atrial pressure.  Left Atrium: The left atrial size was not assessed.  Right Ventricle: The right ventricle is upper limits of normal in size. There is mildly reduced right ventricular systolic function.  Right Atrium: The right atrial size was not assessed.  Aortic Valve: The aortic valve is trileaflet. Aortic valve regurgitation was not assessed.  Mitral Valve: The mitral valve is mild to moderately thickened. There is mild mitral valve regurgitation.  Tricuspid Valve: The tricuspid valve is structurally normal. There is moderate tricuspid regurgitation. The Doppler estimated RVSP is mildly elevated right ventricular systolic pressure at 38.3 mmHg.  Pulmonic Valve: The pulmonic valve was not assessed. The pulmonic valve regurgitation was not assessed.  Pericardium: Pericardial effusion was not assessed.  Aorta: The aortic root was not assessed.       CONCLUSIONS:   1. The left ventricular systolic function is normal, with a Escalante's  biplane calculated ejection fraction of 66%.   2. Spectral Doppler shows a Grade II (pseudonormal pattern) of left ventricular diastolic filling with an elevated left atrial pressure.   3. There is mildly reduced right ventricular systolic function.   4. Right ventricle is upper limits of normal in size.   5. Mildly elevated right ventricular systolic pressure.   6. Moderate tricuspid regurgitation.    QUANTITATIVE DATA SUMMARY:     2D MEASUREMENTS:             Normal Ranges:  LVEDV Index:     20.18 ml/m2       LV SYSTOLIC FUNCTION:                       Normal Ranges:  EF-A4C View:    62 % (>=55%)  EF-A2C View:    72 %  EF-Biplane:     66 %  LV EF Reported: 66 %       LV DIASTOLIC FUNCTION:           Normal Ranges:  MV Peak E:             1.07 m/s  (0.7-1.2 m/s)  MV Peak A:             0.33 m/s  (0.42-0.7 m/s)  E/A Ratio:             3.28      (1.0-2.2)  MV e'                  0.052 m/s (>8.0)  MV lateral e'          0.05 m/s  MV medial e'           0.06 m/s  E/e' Ratio:            20.48     (<8.0)       MITRAL VALVE:          Normal Ranges:  MV DT:        158 msec (150-240msec)       RIGHT VENTRICLE:  RV Basal 3.70 cm  RV Mid   2.20 cm  RV Major 4.7 cm  TAPSE:   13.1 mm  RV s'    0.09 m/s       TRICUSPID VALVE/RVSP:          Normal Ranges:  Peak TR Velocity:     2.97 m/s  RV Syst Pressure:     38 mmHg  (< 30mmHg)  IVC Diam:             1.50 cm       [id number] Jag Aguirre DO  Electronically signed on 4/17/2025 at 1:19:46 PM       ** Final **  ECG 12 lead  Atrial fibrillation  Borderline low voltage, extremity leads  Borderline ST depression, inferior leads  Borderline prolonged QT interval    See ED provider note for full interpretation and clinical correlation  Confirmed by Patricia Taylor ([id number]) on 4/17/2025 11:48:10 AM       Assessment/Plan     Assessment & Plan  Fall, initial encounter  CT imaging showing chronic T7 fracture, chronic fractures of the superior and inferior pubic rami and sacrum  PT/OT/SS  consulted for possible SNF  Gentle hydration overnight followed by orthostatics in the AM.  Also check creatine kinase given how long she was down for.  UA pending, may have UTI.  4/17: Suspect orthostatic hypotension secondary to dehydration.  Continue PT and OT.  Will relax fluid restriction.  Continue to hold Lasix and Aldactone.  No evidence of rhabdomyolysis.  Will likely need skilled nursing on discharge.  Trend BMP.  4/18: Continue slow hydration.  Holding diuretics but avoid boluses.  No nystagmus on exam.  No significant changes on orthostatic vital signs but did feel symptomatic.  Continue PT and OT.  4/19: Gradually rehydrating.  Avoid boluses.  Recheck labs in AM.  Continue PT and OT.  Awaiting authorization for discharge to skilled nursing facility.  4/21: Unsettled this morning.  Feels short of breath but chest x-ray clear.  Abdominal distention, looks like large stool burden.  Will order laxative.  Hypothyroidism    Stage 3 chronic kidney disease, unspecified whether stage 3a or 3b CKD (Multi)    (HFpEF) heart failure with preserved ejection fraction  Continue home meds.  Update echocardiogram  4/17: LVEF was 66%.  Stage II diastolic dysfunction.  Suspect patient is dry at present.  Hold Lasix and Aldactone.  Relax fluid restriction.  4/18: No evidence of acute heart failure.  4/21: Still looks a little prerenal.  Consider restarting diuretics soon.  Type 2 diabetes mellitus  SSI ordered while inpatient  Accuchecks, hypoglycemic protocol  Elevated troponin  Trops 58 -> 64, recheck with  AM labs  No ACS symptoms currently. EKG benign. Check TTE in AM.  4/17: Normal LVEF.  Stage II diastolic dysfunction.  Suspect troponin elevation is secondary to demand ischemia related to her fall and injury.  Persistent atrial fibrillation (Multi)  4/21: Heart rate is slightly irregular today.  EKG shows occasional sawtooth pattern suggestive of atrial flutter.  Complains of shortness of breath but no evidence of  heart failure at this time.  Will place consult to her cardiologist, Dr. Aguirre.  Does not appear to be on chronic anticoagulation which might be a good idea given her recurrent falls          Manuel Hsu MD

## 2025-04-21 NOTE — ASSESSMENT & PLAN NOTE
4/21: Heart rate is slightly irregular today.  EKG shows occasional sawtooth pattern suggestive of atrial flutter.  Complains of shortness of breath but no evidence of heart failure at this time.  Will place consult to her cardiologist, Dr. gAuirre.  Does not appear to be on chronic anticoagulation which might be a good idea given her recurrent falls

## 2025-04-21 NOTE — DOCUMENTATION CLARIFICATION NOTE
"    PATIENT:               DEREK COLE  ACCT #:                  [id number]  MRN:                       [id number]  :                       1929  ADMIT DATE:       2025 7:28 PM  DISCH DATE:  RESPONDING PROVIDER #:        [id number]          PROVIDER RESPONSE TEXT:    I agree with dietician diagnosis of moderate malnutrition documented on 25.    CDI QUERY TEXT:    Clarification        Instruction:    Based on your assessment of the patient and the clinical information, please provide the requested documentation by clicking on the appropriate radio button and enter any additional information if prompted.    Question: Please further clarify this patient nutritional status as    When answering this query, please exercise your independent professional judgment. The fact that a question is being asked, does not imply that any particular answer is desired or expected.    The patient's clinical indicators include:  Clinical Information: 25 IM: \"presenting with Fall, initial encounter. CT imaging showing chronic T7 fracture, chronic fractures of the superior and inferior pubic rami and sacrum. Suspect orthostatic hypotension secondary to dehydration.\"    Clinical Indicators:  25 Nutrition: \"Malnutrition Diagnosis: Moderate malnutrition related to chronic disease or condition  Related to: advanced age, CKD  As Evidenced by: mild to moderate muscle and fat losses, >5% weight loss the past month  Nutrition Diagnosis  Patient has Nutrition Diagnosis: Yes Nutrition Diagnosis 1: Inadequate oral intake  Related to (1): decreased ability to consume sufficient intakes As Evidenced by (1): reduced appetite since being in the hospital.\"    Treatment: 25 Nutrition: \"Individualized Nutrition Prescription Provided for : Removed fat restriction due to advanced age. Encourage po intakes-pt declined supplements. May need to remove Sodium restriction as well if po intakes do not improve. Monitor weight, I's and " "O's.\"      Risk Factors: Advanced age, Weight loss. Poor po.  Options provided:  -- I agree with dietician diagnosis of moderate malnutrition documented on 4/18/25.  -- Other - I will add my own diagnosis  -- Refer to Clinical Documentation Reviewer    Query created by: Yesi Betts on 4/21/2025 10:22 AM      Electronically signed by:  CAMILA RAVI MD 4/21/2025 5:20 PM          "

## 2025-04-21 NOTE — ASSESSMENT & PLAN NOTE
Continue home meds.  Update echocardiogram  4/17: LVEF was 66%.  Stage II diastolic dysfunction.  Suspect patient is dry at present.  Hold Lasix and Aldactone.  Relax fluid restriction.  4/18: No evidence of acute heart failure.  4/21: Still looks a little prerenal.  Consider restarting diuretics soon.

## 2025-04-21 NOTE — CONSULTS
Critical Care Medicine Consult      Reason For Consult  Hypotension    History Of Present Illness  Gladys Salas is a 89 y.o. female with past medical history of cerebral hemorrhage, SVT, atrial fibrillation (not on anticoagulation per patient choice), HFpEF, hypertension, hyperlipidemia, hypothyroidism, and CKD presented to [facility] ED on 4/17/2025 with fall and lightheadedness from home. She suffered a fall at her home when she was reaching under the kitchen sink to get something, became lightheaded, and fell to the floor on her tailbone. She was unable to reach her phone to call for help for 2 hours. She admitted to having lightheaded episodes when standing up almost daily. CT head/cervical/ thoracic/lumbar/ abdomen/pelvis without contrast revealed no acute intracranial abnormality or calvarial fracture, no acute fracture or traumatic malalignment or the cervical, thoracic, or lumbar spine, chronic compression fracture or T7 with 90% height loss and minimal retropulsion not resulting in canal stenosis, no acute fracture of there pelvis, sacrum or proximal right or left femur, chronic fractures of the superior and inferior pubic rami and sacrum, spondylotic changes of the cervical, thoracic, and lumbar spine, no high-grade cervical or thoracic spinal canal stenosis, there is high-grade L4-5 lumbar spinal canal stenosis unchanged from prior study due to anterolisthesis of L4 on L5 associated with severe right foraminal stenosis, and bilateral nonobstructing renal calculo and bladder calculi. ED interventions acetaminophen 975mg, 1L NS bolus, and admission for further care. Patient was admitted to general medicine floor and was awaiting authorization to SNF. On 4/21/2025, patient complaining of shortness of breath although not hypoxic and abdominal bloating. EKG revealed atrial fibrillation/flutter with controlled heart rate. Chest x-ray negative for acute pathology. Abdominal x-ray revealed gaseous distention  of abdomen. Laxative and cardiology consult ordered by internal medicine physician. This evening, patient developed hypotension and was transferred to the ICU. Patient had received 2- 500ml IV boluses, initiation of levophed infusion, and STAT CBC, BMP, lactate, ABG full panel and high sensitivity troponin ordered. Critical care medicine was consulted.     Patient seen and examined in ICU bed 13. Patient alert and oriented x4 and in mild acute distress. Patient reported that she came to this hospital because she fell at home. She reports that she has some shortness of breath and her abdomen is tender. She stated that she hasn't had a bowel movement in several days. She denies any chest pain, palpitations, dizziness, lightheadedness, vision changes, nausea, dysuria, hematuria, or any other complaints.    Medical History[1]  Surgical History[2]  Prescriptions Prior to Admission[3]  Lisinopril  Social History[4]  Family History[5]    Scheduled Medications:   Scheduled Medications[6]     Continuous Medications:   Continuous Medications[7]     PRN Medications:   PRN Medications[8]    Review of Systems:  Review of Systems   Constitutional:  Positive for appetite change.   Respiratory:  Positive for shortness of breath.    Gastrointestinal:  Positive for abdominal distention, abdominal pain and constipation.        Objective   Vitals:  Most Recent:  Vitals:    04/21/25 1910   BP: (!) 55/31   Pulse: 75   Resp: (!) 29   Temp:    SpO2: 98%       24hr Min/Max:  Temp  Min: 35.8 °C (96.5 °F)  Max: 36.4 °C (97.5 °F)  Pulse  Min: 61  Max: 90  BP  Min: 55/31  Max: 177/83  Resp  Min: 10  Max: 30  SpO2  Min: 66 %  Max: 100 %    LDA:          Vent settings:       Hemodynamic parameters for last 24 hours:       No intake or output data in the 24 hours ending 04/21/25 1959        Physical exam:    Physical Exam  Constitutional:       General: She is in acute distress (mild acute distress).   HENT:      Head: Normocephalic.      Nose:  Nose normal.      Mouth/Throat:      Mouth: Mucous membranes are dry.   Eyes:      Extraocular Movements: Extraocular movements intact.      Conjunctiva/sclera: Conjunctivae normal.      Pupils: Pupils are equal, round, and reactive to light.   Cardiovascular:      Rate and Rhythm: Normal rate. Rhythm irregular.      Pulses: Normal pulses.      Heart sounds: Normal heart sounds. No murmur heard.  Pulmonary:      Effort: Pulmonary effort is normal. No respiratory distress.      Breath sounds: Normal breath sounds. No wheezing, rhonchi or rales.   Abdominal:      General: There is distension.      Palpations: Abdomen is soft.      Tenderness: There is abdominal tenderness. There is no guarding.   Musculoskeletal:         General: Normal range of motion.      Cervical back: Normal range of motion.   Skin:     General: Skin is cool and dry.      Capillary Refill: Capillary refill takes 2 to 3 seconds.      Findings: Bruising present.      Comments: Scattered bruising   Neurological:      General: No focal deficit present.      Mental Status: She is alert and oriented to person, place, and time. Mental status is at baseline.      Cranial Nerves: Cranial nerves 2-12 are intact.      Sensory: Sensation is intact.      Motor: Motor function is intact.      Coordination: Coordination is intact.   Psychiatric:         Attention and Perception: Attention normal.         Mood and Affect: Mood normal.         Speech: Speech normal.         Behavior: Behavior normal.         Thought Content: Thought content normal.         Judgment: Judgment normal.          Lab/Radiology/Diagnostic Review:  Results for orders placed or performed during the hospital encounter of 04/16/25 (from the past 24 hours)   CBC   Result Value Ref Range    WBC 4.8 4.4 - 11.3 x10*3/uL    nRBC 0.0 0.0 - 0.0 /100 WBCs    RBC 4.44 4.00 - 5.20 x10*6/uL    Hemoglobin 13.1 12.0 - 16.0 g/dL    Hematocrit 41.3 36.0 - 46.0 %    MCV 93 80 - 100 fL    MCH 29.5 26.0 -  34.0 pg    MCHC 31.7 (L) 32.0 - 36.0 g/dL    RDW 13.7 11.5 - 14.5 %    Platelets 134 (L) 150 - 450 x10*3/uL   Basic Metabolic Panel   Result Value Ref Range    Glucose 115 (H) 74 - 99 mg/dL    Sodium 141 136 - 145 mmol/L    Potassium 4.4 3.5 - 5.3 mmol/L    Chloride 106 98 - 107 mmol/L    Bicarbonate 28 21 - 32 mmol/L    Anion Gap 11 10 - 20 mmol/L    Urea Nitrogen 30 (H) 6 - 23 mg/dL    Creatinine 1.06 (H) 0.50 - 1.05 mg/dL    eGFR 48 (L) >60 mL/min/1.73m*2    Calcium 8.8 8.6 - 10.3 mg/dL   POCT GLUCOSE   Result Value Ref Range    POCT Glucose 124 (H) 74 - 99 mg/dL   POCT GLUCOSE   Result Value Ref Range    POCT Glucose 171 (H) 74 - 99 mg/dL   POCT GLUCOSE   Result Value Ref Range    POCT Glucose 286 (H) 74 - 99 mg/dL   CBC   Result Value Ref Range    WBC 14.3 (H) 4.4 - 11.3 x10*3/uL    nRBC 0.1 (H) 0.0 - 0.0 /100 WBCs    RBC 2.57 (L) 4.00 - 5.20 x10*6/uL    Hemoglobin 7.8 (L) 12.0 - 16.0 g/dL    Hematocrit 24.3 (L) 36.0 - 46.0 %    MCV 95 80 - 100 fL    MCH 30.4 26.0 - 34.0 pg    MCHC 32.1 32.0 - 36.0 g/dL    RDW 14.0 11.5 - 14.5 %    Platelets 171 150 - 450 x10*3/uL   Basic Metabolic Panel   Result Value Ref Range    Glucose 230 (H) 74 - 99 mg/dL    Sodium 137 136 - 145 mmol/L    Potassium 6.2 (HH) 3.5 - 5.3 mmol/L    Chloride 111 (H) 98 - 107 mmol/L    Bicarbonate 20 (L) 21 - 32 mmol/L    Anion Gap 12 10 - 20 mmol/L    Urea Nitrogen 36 (H) 6 - 23 mg/dL    Creatinine 1.85 (H) 0.50 - 1.05 mg/dL    eGFR 25 (L) >60 mL/min/1.73m*2    Calcium 7.2 (L) 8.6 - 10.3 mg/dL   Lactate   Result Value Ref Range    Lactate 5.3 (HH) 0.4 - 2.0 mmol/L   Troponin I, High Sensitivity   Result Value Ref Range    Troponin I, High Sensitivity 20 (H) 0 - 13 ng/L     Imaging  XR abdomen 1 view  Result Date: 4/21/2025  1.  Gaseous distention of the stomach   MACRO: None   Signed by: Rosa Valdovinos 4/21/2025 1:07 PM Dictation workstation:   [id number]    XR chest 1 view  Result Date: 4/21/2025  No acute radiographic abnormality   MACRO:  None.   Signed by: Rosa Valdovinos 4/21/2025 1:03 PM Dictation workstation:   [id number]    CT head wo IV contrast  Result Date: 4/16/2025  1. No acute intracranial abnormality or calvarial fracture.   2. No acute fracture or traumatic malalignment of the cervical, thoracic or lumbar spine.   3. Chronic compression fracture of T7 with 90% height loss and minimal retropulsion not resulting in canal stenosis.   4. No acute fracture of the pelvis, sacrum, or proximal right or left femur. Chronic fractures of the superior and inferior pubic rami and sacrum.   5. Spondylotic changes of the cervical, thoracic, and lumbar spine. No high-grade cervical or thoracic spinal canal stenosis. There is high-grade L4-5 lumbar spinal canal stenosis unchanged from prior study due to anterolisthesis of L4 on L5 associated with severe right foraminal stenosis.   6. Bilateral nonobstructing renal calculi and bladder calculi.   MACRO: None.   Signed by: Caio Jasmine 4/16/2025 11:23 PM Dictation workstation:   [id number]    CT cervical spine wo IV contrast  Result Date: 4/16/2025  1. No acute intracranial abnormality or calvarial fracture.   2. No acute fracture or traumatic malalignment of the cervical, thoracic or lumbar spine.   3. Chronic compression fracture of T7 with 90% height loss and minimal retropulsion not resulting in canal stenosis.   4. No acute fracture of the pelvis, sacrum, or proximal right or left femur. Chronic fractures of the superior and inferior pubic rami and sacrum.   5. Spondylotic changes of the cervical, thoracic, and lumbar spine. No high-grade cervical or thoracic spinal canal stenosis. There is high-grade L4-5 lumbar spinal canal stenosis unchanged from prior study due to anterolisthesis of L4 on L5 associated with severe right foraminal stenosis.   6. Bilateral nonobstructing renal calculi and bladder calculi.   MACRO: None.   Signed by: Caio Jasmine 4/16/2025 11:23 PM Dictation workstation:    [id number]    CT thoracic spine wo IV contrast  Result Date: 4/16/2025  1. No acute intracranial abnormality or calvarial fracture.   2. No acute fracture or traumatic malalignment of the cervical, thoracic or lumbar spine.   3. Chronic compression fracture of T7 with 90% height loss and minimal retropulsion not resulting in canal stenosis.   4. No acute fracture of the pelvis, sacrum, or proximal right or left femur. Chronic fractures of the superior and inferior pubic rami and sacrum.   5. Spondylotic changes of the cervical, thoracic, and lumbar spine. No high-grade cervical or thoracic spinal canal stenosis. There is high-grade L4-5 lumbar spinal canal stenosis unchanged from prior study due to anterolisthesis of L4 on L5 associated with severe right foraminal stenosis.   6. Bilateral nonobstructing renal calculi and bladder calculi.   MACRO: None.   Signed by: Caio Jasmine 4/16/2025 11:23 PM Dictation workstation:   [id number]    CT lumbar spine wo IV contrast  Result Date: 4/16/2025  1. No acute intracranial abnormality or calvarial fracture.   2. No acute fracture or traumatic malalignment of the cervical, thoracic or lumbar spine.   3. Chronic compression fracture of T7 with 90% height loss and minimal retropulsion not resulting in canal stenosis.   4. No acute fracture of the pelvis, sacrum, or proximal right or left femur. Chronic fractures of the superior and inferior pubic rami and sacrum.   5. Spondylotic changes of the cervical, thoracic, and lumbar spine. No high-grade cervical or thoracic spinal canal stenosis. There is high-grade L4-5 lumbar spinal canal stenosis unchanged from prior study due to anterolisthesis of L4 on L5 associated with severe right foraminal stenosis.   6. Bilateral nonobstructing renal calculi and bladder calculi.   MACRO: None.   Signed by: Caio Jasmine 4/16/2025 11:23 PM Dictation workstation:   [id number]    CT pelvis wo IV contrast  Result Date: 4/16/2025  1.  No acute intracranial abnormality or calvarial fracture.   2. No acute fracture or traumatic malalignment of the cervical, thoracic or lumbar spine.   3. Chronic compression fracture of T7 with 90% height loss and minimal retropulsion not resulting in canal stenosis.   4. No acute fracture of the pelvis, sacrum, or proximal right or left femur. Chronic fractures of the superior and inferior pubic rami and sacrum.   5. Spondylotic changes of the cervical, thoracic, and lumbar spine. No high-grade cervical or thoracic spinal canal stenosis. There is high-grade L4-5 lumbar spinal canal stenosis unchanged from prior study due to anterolisthesis of L4 on L5 associated with severe right foraminal stenosis.   6. Bilateral nonobstructing renal calculi and bladder calculi.   MACRO: None.   Signed by: Constantino Jasimne 4/16/2025 11:23 PM Dictation workstation:   [id number]      Cardiology, Vascular, and Other Imaging  Transthoracic Echo (TTE) Limited  Result Date: 4/17/2025              [place] [id number]      Phone [phone] Fax [phone] TRANSTHORACIC ECHOCARDIOGRAM REPORT Patient Name:       DEREK COMER DOUGLAS     Reading Physician:    [id number] Jag Aguirre DO Study Date:         4/17/2025            Ordering Provider:    [id number] CONSTANTINO GARCIA MRN/PID:            [id number]             Fellow: Accession#:         [id number]         Nurse: Date of Birth/Age:  4/12/1929 / 89 years Sonographer:          Rasheeda Pack RDCS Gender Assigned at  F                    Additional Staff: Birth: Height:             152.40 cm            Admit Date:           4/16/2025 Weight:             49.90 kg             Admission Status:     Inpatient -                                                                Routine BSA / BMI:          1.45 m2 / 21.48      Department  Location:  [place] [facility]                     kg/m2 Blood Pressure: 108 /66 mmHg Study Type:    TRANSTHORACIC ECHO (TTE) LIMITED Diagnosis/ICD: Dyspnea, unspecified-R06.00; Elevated Troponin-R79.89 Indication:    Dyspnea, Elevated Troponin CPT Codes:     Echo Limited-16556; Doppler Limited-45238; Color Doppler-25584 Patient History: Pertinent History: CHF, HTN, Hyperlipidemia, A-Fib, Previous SVT and Dyspnea. Study Detail: The following Echo studies were performed: 2D, Doppler and color               flow. Unable to obtain suprasternal notch view.  PHYSICIAN INTERPRETATION: Left Ventricle: The left ventricular systolic function is normal, with a Escalante's biplane calculated ejection fraction of 66%. There are no regional wall motion abnormalities. The left ventricular cavity size is normal. Spectral Doppler shows a Grade II (pseudonormal pattern) of left ventricular diastolic filling with an elevated left atrial pressure. Left Atrium: The left atrial size was not assessed. Right Ventricle: The right ventricle is upper limits of normal in size. There is mildly reduced right ventricular systolic function. Right Atrium: The right atrial size was not assessed. Aortic Valve: The aortic valve is trileaflet. Aortic valve regurgitation was not assessed. Mitral Valve: The mitral valve is mild to moderately thickened. There is mild mitral valve regurgitation. Tricuspid Valve: The tricuspid valve is structurally normal. There is moderate tricuspid regurgitation. The Doppler estimated RVSP is mildly elevated right ventricular systolic pressure at 38.3 mmHg. Pulmonic Valve: The pulmonic valve was not assessed. The pulmonic valve regurgitation was not assessed. Pericardium: Pericardial effusion was not assessed. Aorta: The aortic root was not assessed.  CONCLUSIONS:  1. The left ventricular systolic function is normal, with a Escalante's biplane calculated ejection fraction of 66%.  2. Spectral Doppler shows a Grade II (pseudonormal  pattern) of left ventricular diastolic filling with an elevated left atrial pressure.  3. There is mildly reduced right ventricular systolic function.  4. Right ventricle is upper limits of normal in size.  5. Mildly elevated right ventricular systolic pressure.  6. Moderate tricuspid regurgitation. QUANTITATIVE DATA SUMMARY:  2D MEASUREMENTS:             Normal Ranges: LVEDV Index:     20.18 ml/m2  LV SYSTOLIC FUNCTION:                      Normal Ranges: EF-A4C View:    62 % (>=55%) EF-A2C View:    72 % EF-Biplane:     66 % LV EF Reported: 66 %  LV DIASTOLIC FUNCTION:           Normal Ranges: MV Peak E:             1.07 m/s  (0.7-1.2 m/s) MV Peak A:             0.33 m/s  (0.42-0.7 m/s) E/A Ratio:             3.28      (1.0-2.2) MV e'                  0.052 m/s (>8.0) MV lateral e'          0.05 m/s MV medial e'           0.06 m/s E/e' Ratio:            20.48     (<8.0)  MITRAL VALVE:          Normal Ranges: MV DT:        158 msec (150-240msec)  RIGHT VENTRICLE: RV Basal 3.70 cm RV Mid   2.20 cm RV Major 4.7 cm TAPSE:   13.1 mm RV s'    0.09 m/s  TRICUSPID VALVE/RVSP:          Normal Ranges: Peak TR Velocity:     2.97 m/s RV Syst Pressure:     38 mmHg  (< 30mmHg) IVC Diam:             1.50 cm  [id number] Jag Aguirre DO Electronically signed on 4/17/2025 at 1:19:46 PM  ** Final **     ECG 12 lead  Result Date: 4/17/2025  Atrial fibrillation Borderline low voltage, extremity leads Borderline ST depression, inferior leads Borderline prolonged QT interval See ED provider note for full interpretation and clinical correlation Confirmed by Patricia Taylor ([id number]) on 4/17/2025 11:48:10 AM        Assessment/Plan   Assessment & Plan  Fall, initial encounter    Hypothyroidism    Persistent atrial fibrillation (Multi)    Stage 3 chronic kidney disease, unspecified whether stage 3a or 3b CKD (Multi)    (HFpEF) heart failure with preserved ejection fraction    Type 2 diabetes mellitus    Elevated troponin        Severe sepsis with  septic shock  - Complaints of abdominal pain and abdominal distention  - Developed hypotension  - Chest x-ray negative for acute pathology  - KUB revealed gaseous distention of the stomach  - WBC count 14.3  - Lactate 5.3  - SIRS criteria: temperature, respiratory rate, and WBC count  - Suspected source: possible intra-abdominal  - Organ dysfunction: DUNIA on CKD, hypotension, elevated lactic acid   - s/p 1L bolus  - Give additional 1 L LR bolus  - STAT CTA chest PE protocol and CT abdomen/pelvis ordered  - STAT blood cultures x2  - Check UA with reflex microscopic and culture  - Add Zosyn 3.375gm IV every 6 hours for empiric coverage, de-escalate as appropriate  - Follow CBC and trend fevers  - Insert wood catheter for accurate intake and output  - Strict intake and output    Abdominal pain/distention  - Patient complains of abdominal discomfort and no bowel movement for several days  - Abdomen soft and distended. Hypoactive bowel sounds in all fields  - Upon chart review, noted small hard, formed bowel movement on 4/19  - KUB 4/21/25 revealed gaseous distention of the stomach.  - Lactate 5.3  - STAT CT abdomen/pelvis ordered  - NPO pending CT abdomen/pelvis results    Acute blood loss anemia  - Hemoglobin 7.8, previously 13.1 this morning  - Complaints of abdominal pain and abdominal distention.   - Not currently on oral anticoagulation  - Hold heparin subcutaneously given acute drop in hemoglobin  - Repeat CBC   - Check type and screen  - Monitor for signs and symptoms of bleeding  - Transfuse PRBC as necessary for hemoglobin less than 7 or greater than 7 with signs of bleeding and hemodynamic instability  - STAT CT abdomen/pelvis with IV contrast ordered    Acute respiratory failure with hypoxia  - Patient complaining of some shortness of breath  - Chest x-ray negative for acute pathology  - Unable to obtain accurate SPO2 reading  - Titrate oxygen to maintain SPO2 >90%  - Check ABG full panel  - Check D-Dimer  -  CTA chest to rule out PE given shortness of breath, hypoxia, and hypotension  - Pulmonary toilet  - Add Duonebs PRN for shortness of breath/wheezing    DUNIA on CKD  - Creatinine 1.85, previously 1.06  - Potassium level 6.2  - Bicarbonate 20  - Noted with nausea and poor oral intake x1 day and no bowel movement for several days  - s/p 1L IV bolus  - Give additional 1L LR bolus over 2 hours  - Add LR at 100ml/hr after completion of IV fluid bolus  - Follow BMP, magnesium, and phosphorus  - Replete electrolytes as necessary  - Hyperkalemia protocol ordered: calcium gluconate 2gm IV x1, insulin 10 units IV x1, and 25gm D50 IV x1  - Repeat BMP at 2300  - Consider Lokelma pending CT abdomen/pelvis results  - NPO  - Insert wood cathete for hourly intake and output  - Continue vasopressor support with norepinephrine, titrate to maintain MAP greater than 60mmHg  - Nephrology consult, input appreciated.     Hyperkalemia  - Potassium level 6.2  - Hyperkalemia protocol ordered: calcium gluconate 2gm IV x1, insulin 10 units IV x1, and 25gm D50 IV x1  - Repeat BMP at 2300  - Consider Lokelma pending CT abdomen/pelvis results  - NPO  - Continuous telemetry monitoring  - Nephrology consult    Persistent atrial fibrillation  - Atrial fibrillation controlled rate on telemetry  - Patient has declined oral anticoagulation  - Hold home carvedilol in setting of hypotension, resume as appropriate  - Follow BMP, magnesium, and phosphorus  - Replete electrolytes as necessary  - Cardiology following, input appreciated.     Advanced care planning  -Code status DNR DNI. Patient alert and oriented x4. Discussed with patient about goals of care and possible need for central line placement and/or HD pending kidney function. Patient stated that she would not want a central line or hemodialysis if it was recommended.   Addendum 4/21/25 at 2140: Patient alert and oriented x 4. Discussed with patient about current clinical condition including  decrease in hemoglobin, planned CT scans, and lab work. Patient expressed that she does not want any surgeries if she anything is found on CT, she does not want any blood transfusions, and no dialysis. Explained to patient that she may die and she stated that she is ok with dying. Discussed with patient differences between DNR DNI and DNR Comfort Care Only. Patient stated that she wants to focus on comfort as she has lived a good life and doesn't want anything else done. Called and discussed with patient's daughter, Karen and she is in agreement with patient's decision to focus on comfort and change code status to DNRCC. Changed code status to DNR Comfort Care Only and placed Hospice Consult as per patient's wishes. Will discontinue labs, CT imaging, routine medications, and add prn Dilaudid, ativan, and robinul.     I spent 60 minutes of cumulative critical care time with the patient.  Greater than 50% of that time was spent in the direct collaboration and or coordination of care of the patient.     Dragon dictation software was used to dictate this note and thus there may be minor errors in translation/transcription including garbled speech or misspellings. Please contact for clarification if needed.          [1]   Past Medical History:  Diagnosis Date    Contact with and (suspected) exposure to covid-19 11/26/2022    Fall in home 11/26/2022    Comment on above: FALL AT HOME 11AM    Impacted cerumen 04/18/2024   [2]   Past Surgical History:  Procedure Laterality Date    OTHER SURGICAL HISTORY  12/10/2019    Hysterectomy    OTHER SURGICAL HISTORY  12/10/2019    Lumpectomy   [3]   Medications Prior to Admission   Medication Sig Dispense Refill Last Dose/Taking    carvedilol (Coreg) 25 mg tablet TAKE 1 TABLET TWICE A  tablet 3     denosumab (Prolia) 60 mg/mL syringe Inject 1 mL (60 mg total) under the skin 1 time for 1 dose. (Patient not taking: Reported on 2/5/2025) 1 mL 1     furosemide (Lasix) 40 mg tablet  Take 1 tablet (40 mg) by mouth once daily. 90 tablet 3     levothyroxine (Synthroid, Levoxyl) 50 mcg tablet TAKE 1 TABLET DAILY IN THE MORNING BEFORE A MEAL 90 tablet 3     PARoxetine (Paxil) 20 mg tablet Take 1 tablet (20 mg) by mouth once daily. 90 tablet 3     spironolactone (Aldactone) 25 mg tablet Take 0.5 tablets (12.5 mg) by mouth once daily. 45 tablet 1    [4]   Social History  Tobacco Use    Smoking status: Never     Passive exposure: Never    Smokeless tobacco: Never   Vaping Use    Vaping status: Never Used   Substance Use Topics    Alcohol use: Never    Drug use: Never   [5] No family history on file.  [6] calcium gluconate, 2 g, intravenous, Once  [Held by provider] carvedilol, 25 mg, oral, BID  insulin regular, 10 Units, intravenous, Once   Followed by  dextrose, 25 g, intravenous, Once  heparin (porcine), 5,000 Units, subcutaneous, q8h RENA  insulin lispro, 0-5 Units, subcutaneous, TID AC  lactated Ringer's, 1,000 mL, intravenous, Once  levothyroxine, 50 mcg, oral, Daily  pantoprazole, 40 mg, oral, Daily   Or  pantoprazole, 40 mg, intravenous, Daily  PARoxetine, 20 mg, oral, q PM  perflutren lipid microspheres, 0.5-10 mL of dilution, intravenous, Once in imaging  perflutren protein A microsphere, 0.5 mL, intravenous, Once in imaging  perflutren protein A microsphere, 0.5 mL, intravenous, Once in imaging  piperacillin-tazobactam, 3.375 g, intravenous, q6h  polyethylene glycol, 17 g, oral, BID  sulfur hexafluoride microsphr, 2 mL, intravenous, Once in imaging  [7] dextrose 10 % in water (D10W), 50 mL/hr  norepinephrine, 0.01-1 mcg/kg/min, Last Rate: 0.08 mcg/kg/min (04/21/25 1937)  [8] PRN medications: acetaminophen, bisacodyl, dextrose, dextrose, glucagon, glucagon, guaiFENesin, HYDROcodone-acetaminophen, melatonin, ondansetron **OR** ondansetron

## 2025-04-21 NOTE — CARE PLAN
Problem: Pain - Adult  Goal: Verbalizes/displays adequate comfort level or baseline comfort level  Outcome: Progressing     Problem: Safety - Adult  Goal: Free from fall injury  Outcome: Progressing     Problem: Discharge Planning  Goal: Discharge to home or other facility with appropriate resources  Outcome: Progressing     Problem: Chronic Conditions and Co-morbidities  Goal: Patient's chronic conditions and co-morbidity symptoms are monitored and maintained or improved  Outcome: Progressing     Problem: Nutrition  Goal: Nutrient intake appropriate for maintaining nutritional needs  Outcome: Progressing     Problem: Fall/Injury  Goal: Not fall by end of shift  Outcome: Progressing  Goal: Be free from injury by end of the shift  Outcome: Progressing  Goal: Verbalize understanding of personal risk factors for fall in the hospital  Outcome: Progressing  Goal: Verbalize understanding of risk factor reduction measures to prevent injury from fall in the home  Outcome: Progressing  Goal: Use assistive devices by end of the shift  Outcome: Progressing  Goal: Pace activities to prevent fatigue by end of the shift  Outcome: Progressing     Problem: Skin  Goal: Decreased wound size/increased tissue granulation at next dressing change  Outcome: Progressing  Flowsheets (Taken 4/21/2025 0109)  Decreased wound size/increased tissue granulation at next dressing change:   Promote sleep for wound healing   Protective dressings over bony prominences  Goal: Participates in plan/prevention/treatment measures  Outcome: Progressing  Flowsheets (Taken 4/21/2025 0109)  Participates in plan/prevention/treatment measures:   Elevate heels   Increase activity/out of bed for meals  Goal: Prevent/manage excess moisture  Outcome: Progressing  Flowsheets (Taken 4/21/2025 0109)  Prevent/manage excess moisture:   Cleanse incontinence/protect with barrier cream   Monitor for/manage infection if present   Moisturize dry skin  Goal: Prevent/minimize  sheer/friction injuries  Outcome: Progressing  Flowsheets (Taken 4/21/2025 0109)  Prevent/minimize sheer/friction injuries:   Increase activity/out of bed for meals   Turn/reposition every 2 hours/use positioning/transfer devices   Use pull sheet   HOB 30 degrees or less  Goal: Promote/optimize nutrition  Outcome: Progressing  Flowsheets (Taken 4/21/2025 0109)  Promote/optimize nutrition:   Monitor/record intake including meals   Offer water/supplements/favorite foods   Consume > 50% meals/supplements  Goal: Promote skin healing  Outcome: Progressing  Flowsheets (Taken 4/21/2025 0109)  Promote skin healing:   Turn/reposition every 2 hours/use positioning/transfer devices   Protective dressings over bony prominences       The clinical goals for the shift include pt will remain free from fall and injury throughout the shift

## 2025-04-22 VITALS
DIASTOLIC BLOOD PRESSURE: 46 MMHG | WEIGHT: 118.17 LBS | SYSTOLIC BLOOD PRESSURE: 75 MMHG | BODY MASS INDEX: 23.2 KG/M2 | HEIGHT: 60 IN | OXYGEN SATURATION: 88 % | TEMPERATURE: 95.2 F

## 2025-04-22 PROBLEM — R57.9 SHOCK (MULTI): Status: ACTIVE | Noted: 2025-01-01

## 2025-04-22 LAB
ABO GROUP (TYPE) IN BLOOD: NORMAL
ANTIBODY SCREEN: NORMAL
RH FACTOR (ANTIGEN D): NORMAL

## 2025-04-22 PROCEDURE — 99238 HOSP IP/OBS DSCHRG MGMT 30/<: CPT | Performed by: STUDENT IN AN ORGANIZED HEALTH CARE EDUCATION/TRAINING PROGRAM

## 2025-04-22 NOTE — DISCHARGE SUMMARY
Admission Date: 2025  7:28 PM  Discharge Date: 25   Condition at Discharge:     Discharge Diagnoses:  Fall  Hypothyroidism  DUNIA on CKD3  Hfpef  T2DM  Elevated troponin  Persistent afib  Severe sepsis with septic vs hemorrhagic shock  Acute blood loss anemia  Acute respiratory failure with hypoxia  Hyperkalemia  Advanced care planning/end of life care    Hospital Course:  Gladys Salas is a 89 y.o. female with PMHx s/f cerebral hemorrhage, SVT, afib (not on anticoagulation due to pt choice), Hfpef, HTN, HLD, hypothyroidism, CKD presenting with fall and lightheadedness. Pt suffered a fall at her home today when she was reaching under the kitchen sink to get something. She became acutely lightheaded and fell to the floor on her tailbone. Had headache and tailbone pain, but no LOC. It took her about two hours to reach a phone to call for help. Pt denies any further falls, but admits to lightheaded episodes when standing up almost every day. Symptoms have been intermittent, but have acutely worsened. She describes it as a spinning sensation accompanied by pre-syncope. Her PCP took her off of Jardiance last month because pt felt it was causing these lightheaded symptoms. No chest pain, nausea, vomiting, palpitations, vision changes, abdominal pain, urinary complaints or other symptoms with this. She is taking her medications as prescribed.     Pt admitted to the floor for further workup. CT showed chronic T7 fracture, chronic fractures of the superior and inferior pubic rami and sacrum. Her Lasix and Aldactone were held and fluid intake was encouraged. Pt was symptomatic with orthostatic vital signs. Pt's abdomen became more distended and laxative was given for constipation. Elevated troponins were suspected to be secondary to demand ischemia. Cardiology was consulted for her afib who continued her rate control medications. Pt was pending SNF placement when she decompensated. On the evening of 25  she became acutely short of breath. CXR was negative for acute pathology. Abdominal X-ray revealed gaseous distention of the abdomen. Pt was transferred to the ICU for hypotension and received multiple fluid boluses before being started on Levophed. Lactate was elevated at 5.3. Antibiotics were started and blood cultures were drawn for possible sepsis. Hg was noted to have dropped to 7.8 from 13.1 in the morning, pointing towards possible abdominal hemorrhage. Pt was A&O X4 and capable of making her own decisions when goals of care discussion was done with her. Pt declined surgeries, blood transfusions, dialysis. Pt opted for DNR comfort care and later passed away on 25 with family present at bedside. Was called by nursing to patient's bedside. There were no heart tones present. There were no breath sounds or spontaneous respirations. There was no palpable carotid pulse. The pupils were fixed and dilated. There was no response to noxious stimli. Family is updated. Patient is pronounced on 25 at 0125.    Consultations: Cardiology consulted- treatment options were discussed and plan of care agreed upon. and Critical Care consulted- treatment options were discussed and plan of care agreed upon.    Pertinent Physical Exam At Time of Discharge:  Constitutional: unresponsive  Skin: Warm and dry; no obvious lesions, rashes, pallor, or jaundice.   Eyes: pupils fixed and dilated  ENT: Mucous membranes moist; no obvious injury or deformity appreciated.   Head and Neck: Normocephalic, atraumatic. ROM preserved. Trachea midline. No appreciable JVD.   Respiratory: no lungs sounds on auscultation  Cardiovascular: No heart sounds on auscultation  GI: Abdomen distended  MSK: No gross abnormalities appreciated. No limitations to AROM/PROM appreciated.   Extremities: cyanotic  Neuro: A&Ox0.   Psych: unable to assess      Anticipated Discharge Destination:     Family was notified and condolences were expressed.      Discharge planning and coordination of care took less than 30 minutes.    Caio Montalvo, DO

## 2025-04-26 LAB — BACTERIA BLD CULT: NORMAL

## 2025-07-30 ENCOUNTER — APPOINTMENT (OUTPATIENT)
Dept: PRIMARY CARE | Facility: CLINIC | Age: OVER 89
End: 2025-07-30
Payer: COMMERCIAL

## 2025-08-04 ENCOUNTER — APPOINTMENT (OUTPATIENT)
Dept: CARDIOLOGY | Facility: HOSPITAL | Age: OVER 89
End: 2025-08-04
Payer: COMMERCIAL

## 2025-08-06 ENCOUNTER — APPOINTMENT (OUTPATIENT)
Dept: CARDIOLOGY | Facility: HOSPITAL | Age: OVER 89
End: 2025-08-06
Payer: COMMERCIAL